# Patient Record
Sex: FEMALE | Race: WHITE | NOT HISPANIC OR LATINO | Employment: FULL TIME | URBAN - METROPOLITAN AREA
[De-identification: names, ages, dates, MRNs, and addresses within clinical notes are randomized per-mention and may not be internally consistent; named-entity substitution may affect disease eponyms.]

---

## 2018-08-29 ENCOUNTER — OFFICE VISIT (OUTPATIENT)
Dept: OBSTETRICS AND GYNECOLOGY | Facility: CLINIC | Age: 28
End: 2018-08-29
Payer: COMMERCIAL

## 2018-08-29 ENCOUNTER — LAB VISIT (OUTPATIENT)
Dept: LAB | Facility: HOSPITAL | Age: 28
End: 2018-08-29
Attending: OBSTETRICS & GYNECOLOGY
Payer: COMMERCIAL

## 2018-08-29 VITALS
DIASTOLIC BLOOD PRESSURE: 84 MMHG | HEIGHT: 61 IN | BODY MASS INDEX: 26.06 KG/M2 | WEIGHT: 138 LBS | HEART RATE: 63 BPM | SYSTOLIC BLOOD PRESSURE: 135 MMHG | RESPIRATION RATE: 15 BRPM

## 2018-08-29 DIAGNOSIS — Z11.3 SCREEN FOR STD (SEXUALLY TRANSMITTED DISEASE): ICD-10-CM

## 2018-08-29 DIAGNOSIS — Z01.419 ENCOUNTER FOR WELL WOMAN EXAM WITH ROUTINE GYNECOLOGICAL EXAM: Primary | ICD-10-CM

## 2018-08-29 DIAGNOSIS — Z30.9 ENCOUNTER FOR CONTRACEPTIVE MANAGEMENT, UNSPECIFIED TYPE: ICD-10-CM

## 2018-08-29 PROCEDURE — 99999 PR PBB SHADOW E&M-NEW PATIENT-LVL III: CPT | Mod: PBBFAC,,, | Performed by: OBSTETRICS & GYNECOLOGY

## 2018-08-29 PROCEDURE — 99385 PREV VISIT NEW AGE 18-39: CPT | Mod: S$GLB,,, | Performed by: OBSTETRICS & GYNECOLOGY

## 2018-08-29 PROCEDURE — 86703 HIV-1/HIV-2 1 RESULT ANTBDY: CPT

## 2018-08-29 PROCEDURE — 87491 CHLMYD TRACH DNA AMP PROBE: CPT

## 2018-08-29 PROCEDURE — 36415 COLL VENOUS BLD VENIPUNCTURE: CPT

## 2018-08-29 PROCEDURE — 87480 CANDIDA DNA DIR PROBE: CPT

## 2018-08-29 PROCEDURE — 86592 SYPHILIS TEST NON-TREP QUAL: CPT

## 2018-08-29 PROCEDURE — 87510 GARDNER VAG DNA DIR PROBE: CPT

## 2018-08-29 PROCEDURE — 80074 ACUTE HEPATITIS PANEL: CPT

## 2018-08-29 RX ORDER — NORETHINDRONE ACETATE AND ETHINYL ESTRADIOL 1MG-20(21)
1 KIT ORAL DAILY
Qty: 28 TABLET | Refills: 11 | Status: SHIPPED | OUTPATIENT
Start: 2018-08-29 | End: 2018-10-22

## 2018-08-29 NOTE — PROGRESS NOTES
SUBJECTIVE:   Isabelle Porter is a 28 y.o. female   for annual well woman exam. Patient's last menstrual period was 2018..  She has no unusual complaints.      Contraception: ocp, on taytulla currently. Was on loestrin 24 before doing well on it but longer cover by insurance.  taytulla makes her emotional    Thinks she had yeast infection one week ago, took diflucan with mod relief. Only mild itching today      No past medical history on file.  No past surgical history on file.  Social History     Socioeconomic History    Marital status: Single     Spouse name: Not on file    Number of children: Not on file    Years of education: Not on file    Highest education level: Not on file   Social Needs    Financial resource strain: Not on file    Food insecurity - worry: Not on file    Food insecurity - inability: Not on file    Transportation needs - medical: Not on file    Transportation needs - non-medical: Not on file   Occupational History    Not on file   Tobacco Use    Smoking status: Never Smoker   Substance and Sexual Activity    Alcohol use: No    Drug use: No    Sexual activity: Yes     Partners: Male   Other Topics Concern    Not on file   Social History Narrative    Not on file     Family History   Problem Relation Age of Onset    Diabetes Other      OB History    Para Term  AB Living   0         0   SAB TAB Ectopic Multiple Live Births           0         Obstetric Comments   Gynhx: 10/reg.   OCP -    Denies std,    Denies abnl, Pap 2016 NEG   S/p gardasil         Current Outpatient Medications   Medication Sig Dispense Refill    FLUARIX QUAD 4269-8913, PF, 60 mcg (15 mcg x 4)/0.5 mL Syrg   0    FLUVIRIN [id number] 45 mcg (15 mcg x 3)/0.5 mL Susp ADM 0.5ML IM UTD  0    norethindrone-ethinyl estradiol (LOESTRIN FE , 28-DAY,) 1 mg-20 mcg (21)/75 mg (7) per tablet Take 1 tablet by mouth once daily. 28 tablet 11     No current facility-administered medications for  "this visit.      Allergies: Codeine and Latex, natural rubber       ROS:  GENERAL: Denies weight gain or weight loss. Feeling well overall.   SKIN: Denies rash or lesions.   HEAD: Denies head injury or headache.   NODES: Denies enlarged lymph nodes.   CHEST: Denies chest pain or shortness of breath.   CARDIOVASCULAR: Denies palpitations or left sided chest pain.   ABDOMEN: No abdominal pain, constipation, diarrhea, nausea, vomiting or rectal bleeding.   URINARY: No frequency, dysuria, hematuria, or burning on urination.  REPRODUCTIVE: Denies vaginal discharge, abnormal vaginal bleeding, lesions, pelvic pain  BREASTS: The patient performs breast self-examination and denies pain, lumps, or nipple discharge.   HEMATOLOGIC: No easy bruisability or excessive bleeding.  MUSCULOSKELETAL: Denies joint pain or swelling.   NEUROLOGIC: Denies syncope or weakness.   PSYCHIATRIC: Denies depression, anxiety or mood swings.      OBJECTIVE:   /84   Pulse 63   Resp 15   Ht 5' 1" (1.549 m)   Wt 62.6 kg (138 lb 0.1 oz)   LMP 08/01/2018   BMI 26.08 kg/m²   The patient appears well, alert, oriented x 3, in no distress.  PSYCH:  Normal, full range of affect  NECK: negative, no thyromegaly, trachea midline  SKIN: normal, good color, good turgor and no acne, striae, hirsutism  BREAST EXAM: breasts appear normal, no suspicious masses, no skin or nipple changes or axillary nodes  ABDOMEN: soft, non-tender; bowel sounds normal; no masses,  no organomegaly and no hernias, masses, or hepatosplenomegaly  GENITALIA: normal external genitalia, no erythema, no discharge  BLADDER: soft  URETHRA: normal appearing urethra with no masses, tenderness or lesions and normal urethra, normal urethral meatus  VAGINA: Normal  CERVIX: no lesions or cervical motion tenderness  UTERUS: normal size, contour, position, consistency, mobility, non-tender  ADNEXA: no mass, fullness, tenderness      ASSESSMENT:   1. Health maintenance  -pap 2016 neg, next " pap 2019  -std screening, continue condoms for std prevention  -counseled on exercise and healthy diet  -contraception:  Will switch to loestrin 1/20.

## 2018-08-30 LAB
CANDIDA RRNA VAG QL PROBE: NEGATIVE
G VAGINALIS RRNA GENITAL QL PROBE: NEGATIVE
HAV IGM SERPL QL IA: NEGATIVE
HBV CORE IGM SERPL QL IA: NEGATIVE
HBV SURFACE AG SERPL QL IA: NEGATIVE
HCV AB SERPL QL IA: NEGATIVE
HIV 1+2 AB+HIV1 P24 AG SERPL QL IA: NEGATIVE
T VAGINALIS RRNA GENITAL QL PROBE: NEGATIVE

## 2018-08-31 LAB
C TRACH DNA SPEC QL NAA+PROBE: NOT DETECTED
N GONORRHOEA DNA SPEC QL NAA+PROBE: NOT DETECTED
RPR SER QL: NORMAL

## 2018-10-15 ENCOUNTER — TELEPHONE (OUTPATIENT)
Dept: OBSTETRICS AND GYNECOLOGY | Facility: CLINIC | Age: 28
End: 2018-10-15

## 2018-10-15 NOTE — TELEPHONE ENCOUNTER
----- Message from Keli Partida sent at 10/15/2018  8:10 AM CDT -----  Contact: Self  Pt is calling to speak with staff about changing the birth control she was put on. She is having a lot of side effects from the one she is on now. Please call pt at [phone].    ## Pt would need it changed before Sunday.       [id number]  [place] - [address] AT [facility] [address]  [address]  [place] [address]  Phone: [phone] Fax: [phone]    ----------------------------------------------------------------  10/15/18 @ 2401 ([facility])  SPOKE WITH MS LÓPEZ , SHE STATED SHE IS HAVING PROBLEMS WITH HER BIRTH CONTROL AND LOTS OF SIDE EFFECTS . INFORMED HER THAT SHE NEEDS TO SEE DR BARNES FACE TO FACE AND THE ISSUES , SHE WANT TO HAVE BIRTH CONTROL CHANGED , INFORMED HER THAT UNLESS DR BARNES KNOWS THE SYMPTOMS SHE IS NOT GOING TO ORDER ANOTHER BIRTH CONTROL WITH THE POSSIBILITY OF THE SAME THING HAPPENING , INFORMED HER TO COMPLETED THE ONES SHE HAS AND WHEN SHE COMES IN THE DR WILL TELL HER WHEN TO START THE NEW ONE THAT WILL BE ORDERED. OFFERED PT AN APPT TODAY AND TOMORROW , SHE STATED THAT SHE CAN NOT COME IN UNTIL NEXT WEEK DUE TO BACK TO BACK TRIALS.   APPT MADE WITH DR BARNES ON 10/22/18 @ 2:30    MESSAGE SENT TO DR BARNES FOR HER FYI

## 2018-10-22 ENCOUNTER — OFFICE VISIT (OUTPATIENT)
Dept: OBSTETRICS AND GYNECOLOGY | Facility: CLINIC | Age: 28
End: 2018-10-22
Payer: COMMERCIAL

## 2018-10-22 VITALS
RESPIRATION RATE: 15 BRPM | BODY MASS INDEX: 26.39 KG/M2 | HEART RATE: 55 BPM | HEIGHT: 61 IN | DIASTOLIC BLOOD PRESSURE: 80 MMHG | SYSTOLIC BLOOD PRESSURE: 129 MMHG | WEIGHT: 139.75 LBS

## 2018-10-22 DIAGNOSIS — Z30.41 ENCOUNTER FOR SURVEILLANCE OF CONTRACEPTIVE PILLS: Primary | ICD-10-CM

## 2018-10-22 PROCEDURE — 99999 PR PBB SHADOW E&M-EST. PATIENT-LVL III: CPT | Mod: PBBFAC,,, | Performed by: OBSTETRICS & GYNECOLOGY

## 2018-10-22 PROCEDURE — 3008F BODY MASS INDEX DOCD: CPT | Mod: CPTII,S$GLB,, | Performed by: OBSTETRICS & GYNECOLOGY

## 2018-10-22 PROCEDURE — 99213 OFFICE O/P EST LOW 20 MIN: CPT | Mod: S$GLB,,, | Performed by: OBSTETRICS & GYNECOLOGY

## 2018-10-22 RX ORDER — NORETHINDRONE ACETATE AND ETHINYL ESTRADIOL, AND FERROUS FUMARATE 1MG-20(24)
1 KIT ORAL DAILY
Qty: 28 CAPSULE | Refills: 11 | Status: SHIPPED | OUTPATIENT
Start: 2018-10-22 | End: 2018-10-22 | Stop reason: SDUPTHER

## 2018-10-22 RX ORDER — NORETHINDRONE ACETATE AND ETHINYL ESTRADIOL, AND FERROUS FUMARATE 1MG-20(24)
1 KIT ORAL DAILY
Qty: 28 CAPSULE | Refills: 11 | Status: SHIPPED | OUTPATIENT
Start: 2018-10-22 | End: 2021-05-06

## 2018-10-22 NOTE — PROGRESS NOTES
SUBJECTIVE:   28 y.o. female   for ocp side effects  Patient's last menstrual period was 10/22/2018 (exact date)..      Pt was started on  loestrin , had breast tenderness the first month but resolved. She then had BTB everyday on the .  She takes her pills daily.  She had neg UPT at home  Was on loestrin 24 before but insurance no longer covers  taytulla makes her emotional, she prefers to get restarted on this    OB History    Para Term  AB Living   0         0   SAB TAB Ectopic Multiple Live Births           0         Obstetric Comments   Gynhx: 10/reg.   OCP -    Denies std,    Denies abnl, Pap 2016 NEG   S/p gardasil     No past medical history on file.  No past surgical history on file.  Social History     Socioeconomic History    Marital status: Single     Spouse name: Not on file    Number of children: Not on file    Years of education: Not on file    Highest education level: Not on file   Social Needs    Financial resource strain: Not on file    Food insecurity - worry: Not on file    Food insecurity - inability: Not on file    Transportation needs - medical: Not on file    Transportation needs - non-medical: Not on file   Occupational History    Not on file   Tobacco Use    Smoking status: Never Smoker   Substance and Sexual Activity    Alcohol use: No    Drug use: No    Sexual activity: Yes     Partners: Male   Other Topics Concern    Not on file   Social History Narrative    Not on file     Family History   Problem Relation Age of Onset    Diabetes Other          Current Outpatient Medications   Medication Sig Dispense Refill    FLUARIX QUAD [id number], PF, 60 mcg (15 mcg x 4)/0.5 mL Syrg   0    FLUVIRIN [id number] 45 mcg (15 mcg x 3)/0.5 mL Susp ADM 0.5ML IM UTD  0    norethindrone-e.estradiol-iron (TAYTULLA) 1 mg-20 mcg (24)/75 mg (4) Cap Take 1 tablet by mouth once daily. Please give brand name 28 capsule 11     No current facility-administered  "medications for this visit.      Allergies: Codeine and Latex, natural rubber     ROS:  GENERAL: Denies weight gain or weight loss. Feeling well overall.   SKIN: Denies rash or lesions.   HEAD: Denies head injury or headache.   NODES: Denies enlarged lymph nodes.   CHEST: Denies chest pain or shortness of breath.   CARDIOVASCULAR: Denies palpitations or left sided chest pain.   ABDOMEN: No abdominal pain, constipation, diarrhea, nausea, vomiting or rectal bleeding.   URINARY: No frequency, dysuria, hematuria, or burning on urination.  REPRODUCTIVE: see HPI  BREASTS:see HPI  HEMATOLOGIC: No easy bruisability or excessive bleeding.  MUSCULOSKELETAL: Denies joint pain or swelling.   NEUROLOGIC: Denies syncope or weakness.   PSYCHIATRIC: Denies depression, anxiety or mood swings.      OBJECTIVE:   /80   Pulse (!) 55   Resp 15   Ht 5' 1" (1.549 m)   Wt 63.4 kg (139 lb 12.4 oz)   LMP 10/22/2018 (Exact Date) Comment: pt states she have been bleeding for over an month   BMI 26.41 kg/m²   The patient appears well, alert, oriented x 3, in no distress.    Counseling appt only      ASSESSMENT:   1. Discussed ocp side effects.  Pt elected to restart on taytulla. rx for taytulla sent  "

## 2018-12-17 ENCOUNTER — TELEPHONE (OUTPATIENT)
Dept: OBSTETRICS AND GYNECOLOGY | Facility: CLINIC | Age: 28
End: 2018-12-17

## 2018-12-17 RX ORDER — NORGESTIMATE AND ETHINYL ESTRADIOL 0.25-0.035
1 KIT ORAL DAILY
Qty: 28 TABLET | Refills: 11 | Status: SHIPPED | OUTPATIENT
Start: 2018-12-17 | End: 2019-11-20 | Stop reason: SDUPTHER

## 2018-12-17 NOTE — TELEPHONE ENCOUNTER
12/17/18 @ 1758 ([facility])   SPOKE WITH MS LÓPEZ, INFORMED THE THAT DR BARNES SENT SPRINTEC TO HER PHARMACY AND IT  SHOULD BE ABOUT $9         NicholeBalbir Cárdenas Mai, MD   to Me           12/17/18 5:42 PM    rx for sprintec sent   If insurance does not cover, [facility] should have it for $9.   If not she can also call insurance to see which OCP is preferred

## 2018-12-17 NOTE — TELEPHONE ENCOUNTER
12/17/18 @ 8614 ([facility])  SPOKE WITH MS LÓPEZ, SHE STATED THAT HER INSURANCE CO. WILL NO LONGER COVER HER BIRTH CONTROL AND SHE WOULD LIKE THE DR TO ORDER ONE THAT HER INSURANCE WILL COVER, INFORMED PT THAT I WILL SEND DR ABRNES A MESSAGE TO ORDER A NEW BIRTH CONTROL FOR HER    ----- Message from Jenny Shirley sent at 12/17/2018  8:44 AM CST -----  Contact: self  Pt calling to discuss birth control. Please call [phone].

## 2019-11-19 RX ORDER — NORGESTIMATE AND ETHINYL ESTRADIOL 0.25-0.035
1 KIT ORAL DAILY
Qty: 28 TABLET | Refills: 11 | Status: CANCELLED | OUTPATIENT
Start: 2019-11-19 | End: 2020-11-18

## 2019-11-19 NOTE — TELEPHONE ENCOUNTER
Pharmacy called with refill request. Called pt to verify, pt states that she is now seeing a different OB due to insurance purposes and has already received the medication.

## 2019-11-20 RX ORDER — NORGESTIMATE AND ETHINYL ESTRADIOL 0.25-0.035
KIT ORAL
Qty: 28 TABLET | Refills: 0 | Status: SHIPPED | OUTPATIENT
Start: 2019-11-20 | End: 2021-05-06

## 2021-02-26 ENCOUNTER — OFFICE VISIT (OUTPATIENT)
Dept: OBSTETRICS AND GYNECOLOGY | Facility: CLINIC | Age: 31
End: 2021-02-26
Payer: COMMERCIAL

## 2021-02-26 ENCOUNTER — LAB VISIT (OUTPATIENT)
Dept: LAB | Facility: OTHER | Age: 31
End: 2021-02-26
Attending: OBSTETRICS & GYNECOLOGY
Payer: OTHER GOVERNMENT

## 2021-02-26 VITALS
WEIGHT: 175.94 LBS | SYSTOLIC BLOOD PRESSURE: 114 MMHG | BODY MASS INDEX: 33.24 KG/M2 | DIASTOLIC BLOOD PRESSURE: 68 MMHG

## 2021-02-26 DIAGNOSIS — Z31.41 FERTILITY TESTING: Primary | ICD-10-CM

## 2021-02-26 DIAGNOSIS — Z01.419 ENCOUNTER FOR ANNUAL ROUTINE GYNECOLOGICAL EXAMINATION: ICD-10-CM

## 2021-02-26 DIAGNOSIS — Z31.41 FERTILITY TESTING: ICD-10-CM

## 2021-02-26 LAB
ESTRADIOL SERPL-MCNC: 92 PG/ML
FSH SERPL-ACNC: 3.6 MIU/ML
LH SERPL-ACNC: 8.5 MIU/ML
PROGEST SERPL-MCNC: 3.9 NG/ML
PROLACTIN SERPL IA-MCNC: 9.2 NG/ML (ref 5.2–26.5)
TSH SERPL DL<=0.005 MIU/L-ACNC: 0.99 UIU/ML (ref 0.4–4)

## 2021-02-26 PROCEDURE — 88175 CYTOPATH C/V AUTO FLUID REDO: CPT | Performed by: PATHOLOGY

## 2021-02-26 PROCEDURE — 82670 ASSAY OF TOTAL ESTRADIOL: CPT

## 2021-02-26 PROCEDURE — 36415 COLL VENOUS BLD VENIPUNCTURE: CPT

## 2021-02-26 PROCEDURE — 3008F PR BODY MASS INDEX (BMI) DOCUMENTED: ICD-10-PCS | Mod: CPTII,S$GLB,, | Performed by: OBSTETRICS & GYNECOLOGY

## 2021-02-26 PROCEDURE — 1126F PR PAIN SEVERITY QUANTIFIED, NO PAIN PRESENT: ICD-10-PCS | Mod: S$GLB,,, | Performed by: OBSTETRICS & GYNECOLOGY

## 2021-02-26 PROCEDURE — 83002 ASSAY OF GONADOTROPIN (LH): CPT

## 2021-02-26 PROCEDURE — 83001 ASSAY OF GONADOTROPIN (FSH): CPT

## 2021-02-26 PROCEDURE — 99395 PREV VISIT EST AGE 18-39: CPT | Mod: 25,S$GLB,, | Performed by: OBSTETRICS & GYNECOLOGY

## 2021-02-26 PROCEDURE — 99395 PR PREVENTIVE VISIT,EST,18-39: ICD-10-PCS | Mod: 25,S$GLB,, | Performed by: OBSTETRICS & GYNECOLOGY

## 2021-02-26 PROCEDURE — 84443 ASSAY THYROID STIM HORMONE: CPT

## 2021-02-26 PROCEDURE — 84146 ASSAY OF PROLACTIN: CPT

## 2021-02-26 PROCEDURE — 3008F BODY MASS INDEX DOCD: CPT | Mod: CPTII,S$GLB,, | Performed by: OBSTETRICS & GYNECOLOGY

## 2021-02-26 PROCEDURE — 1126F AMNT PAIN NOTED NONE PRSNT: CPT | Mod: S$GLB,,, | Performed by: OBSTETRICS & GYNECOLOGY

## 2021-02-26 PROCEDURE — 99999 PR PBB SHADOW E&M-EST. PATIENT-LVL III: ICD-10-PCS | Mod: PBBFAC,,, | Performed by: OBSTETRICS & GYNECOLOGY

## 2021-02-26 PROCEDURE — 99999 PR PBB SHADOW E&M-EST. PATIENT-LVL III: CPT | Mod: PBBFAC,,, | Performed by: OBSTETRICS & GYNECOLOGY

## 2021-02-26 PROCEDURE — 84144 ASSAY OF PROGESTERONE: CPT

## 2021-02-26 RX ORDER — CETIRIZINE HYDROCHLORIDE 10 MG/1
10 TABLET ORAL
COMMUNITY
End: 2021-05-06

## 2021-03-01 ENCOUNTER — HOSPITAL ENCOUNTER (OUTPATIENT)
Dept: RADIOLOGY | Facility: OTHER | Age: 31
Discharge: HOME | End: 2021-03-01
Attending: OBSTETRICS & GYNECOLOGY
Payer: COMMERCIAL

## 2021-03-01 DIAGNOSIS — Z31.41 FERTILITY TESTING: ICD-10-CM

## 2021-03-01 PROCEDURE — 76856 US EXAM PELVIC COMPLETE: CPT | Mod: TC

## 2021-03-01 PROCEDURE — 76856 US PELVIS COMP WITH TRANSVAG NON-OB (XPD): ICD-10-PCS | Mod: 26,,, | Performed by: RADIOLOGY

## 2021-03-01 PROCEDURE — 76830 TRANSVAGINAL US NON-OB: CPT | Mod: 26,,, | Performed by: RADIOLOGY

## 2021-03-01 PROCEDURE — 76830 US PELVIS COMP WITH TRANSVAG NON-OB (XPD): ICD-10-PCS | Mod: 26,,, | Performed by: RADIOLOGY

## 2021-03-01 PROCEDURE — 76856 US EXAM PELVIC COMPLETE: CPT | Mod: 26,,, | Performed by: RADIOLOGY

## 2021-03-05 ENCOUNTER — PATIENT MESSAGE (OUTPATIENT)
Dept: OBSTETRICS AND GYNECOLOGY | Facility: CLINIC | Age: 31
End: 2021-03-05

## 2021-03-09 ENCOUNTER — PATIENT MESSAGE (OUTPATIENT)
Dept: OBSTETRICS AND GYNECOLOGY | Facility: CLINIC | Age: 31
End: 2021-03-09

## 2021-03-19 ENCOUNTER — PATIENT MESSAGE (OUTPATIENT)
Dept: OBSTETRICS AND GYNECOLOGY | Facility: CLINIC | Age: 31
End: 2021-03-19

## 2021-03-19 LAB
FINAL PATHOLOGIC DIAGNOSIS: ABNORMAL
Lab: ABNORMAL

## 2021-04-15 ENCOUNTER — PATIENT MESSAGE (OUTPATIENT)
Dept: OBSTETRICS AND GYNECOLOGY | Facility: CLINIC | Age: 31
End: 2021-04-15

## 2021-04-16 ENCOUNTER — HOSPITAL ENCOUNTER (EMERGENCY)
Facility: OTHER | Age: 31
Discharge: HOME | End: 2021-04-16
Attending: EMERGENCY MEDICINE
Payer: OTHER GOVERNMENT

## 2021-04-16 ENCOUNTER — TELEPHONE (OUTPATIENT)
Dept: OBSTETRICS AND GYNECOLOGY | Facility: CLINIC | Age: 31
End: 2021-04-16

## 2021-04-16 ENCOUNTER — NURSE TRIAGE (OUTPATIENT)
Dept: ADMINISTRATIVE | Facility: CLINIC | Age: 31
End: 2021-04-16

## 2021-04-16 VITALS
SYSTOLIC BLOOD PRESSURE: 117 MMHG | HEART RATE: 99 BPM | BODY MASS INDEX: 33.38 KG/M2 | RESPIRATION RATE: 19 BRPM | WEIGHT: 170 LBS | TEMPERATURE: 99 F | DIASTOLIC BLOOD PRESSURE: 65 MMHG | OXYGEN SATURATION: 100 % | HEIGHT: 60 IN

## 2021-04-16 DIAGNOSIS — Z34.90 PREGNANCY: Primary | ICD-10-CM

## 2021-04-16 DIAGNOSIS — O20.9 VAGINAL BLEEDING IN PREGNANCY, FIRST TRIMESTER: ICD-10-CM

## 2021-04-16 DIAGNOSIS — O20.0 THREATENED MISCARRIAGE: ICD-10-CM

## 2021-04-16 DIAGNOSIS — N93.9 VAGINAL BLEEDING: Primary | ICD-10-CM

## 2021-04-16 LAB
ABO + RH BLD: NORMAL
ALBUMIN SERPL BCP-MCNC: 4.1 G/DL (ref 3.5–5.2)
ALP SERPL-CCNC: 56 U/L (ref 55–135)
ALT SERPL W/O P-5'-P-CCNC: 36 U/L (ref 10–44)
ANION GAP SERPL CALC-SCNC: 8 MMOL/L (ref 8–16)
AST SERPL-CCNC: 26 U/L (ref 10–40)
B-HCG UR QL: POSITIVE
BACTERIA GENITAL QL WET PREP: ABNORMAL
BASOPHILS # BLD AUTO: 0.04 K/UL (ref 0–0.2)
BASOPHILS NFR BLD: 0.5 % (ref 0–1.9)
BILIRUB SERPL-MCNC: 0.4 MG/DL (ref 0.1–1)
BUN SERPL-MCNC: 10 MG/DL (ref 6–20)
CALCIUM SERPL-MCNC: 9.2 MG/DL (ref 8.7–10.5)
CHLORIDE SERPL-SCNC: 108 MMOL/L (ref 95–110)
CLUE CELLS VAG QL WET PREP: ABNORMAL
CO2 SERPL-SCNC: 22 MMOL/L (ref 23–29)
CREAT SERPL-MCNC: 0.8 MG/DL (ref 0.5–1.4)
CTP QC/QA: YES
DIFFERENTIAL METHOD: NORMAL
EOSINOPHIL # BLD AUTO: 0.1 K/UL (ref 0–0.5)
EOSINOPHIL NFR BLD: 1.5 % (ref 0–8)
ERYTHROCYTE [DISTWIDTH] IN BLOOD BY AUTOMATED COUNT: 12.9 % (ref 11.5–14.5)
EST. GFR  (AFRICAN AMERICAN): >60 ML/MIN/1.73 M^2
EST. GFR  (NON AFRICAN AMERICAN): >60 ML/MIN/1.73 M^2
FILAMENT FUNGI VAG WET PREP-#/AREA: ABNORMAL
GLUCOSE SERPL-MCNC: 111 MG/DL (ref 70–110)
HCG INTACT+B SERPL-ACNC: 2637 MIU/ML
HCT VFR BLD AUTO: 39.3 % (ref 37–48.5)
HGB BLD-MCNC: 13.2 G/DL (ref 12–16)
IMM GRANULOCYTES # BLD AUTO: 0.03 K/UL (ref 0–0.04)
IMM GRANULOCYTES NFR BLD AUTO: 0.4 % (ref 0–0.5)
LYMPHOCYTES # BLD AUTO: 2.2 K/UL (ref 1–4.8)
LYMPHOCYTES NFR BLD: 28.8 % (ref 18–48)
MCH RBC QN AUTO: 28.6 PG (ref 27–31)
MCHC RBC AUTO-ENTMCNC: 33.6 G/DL (ref 32–36)
MCV RBC AUTO: 85 FL (ref 82–98)
MONOCYTES # BLD AUTO: 0.5 K/UL (ref 0.3–1)
MONOCYTES NFR BLD: 6.2 % (ref 4–15)
NEUTROPHILS # BLD AUTO: 4.9 K/UL (ref 1.8–7.7)
NEUTROPHILS NFR BLD: 62.6 % (ref 38–73)
NRBC BLD-RTO: 0 /100 WBC
PLATELET # BLD AUTO: 323 K/UL (ref 150–450)
PMV BLD AUTO: 9.4 FL (ref 9.2–12.9)
POTASSIUM SERPL-SCNC: 3.6 MMOL/L (ref 3.5–5.1)
PROT SERPL-MCNC: 7 G/DL (ref 6–8.4)
RBC # BLD AUTO: 4.62 M/UL (ref 4–5.4)
SODIUM SERPL-SCNC: 138 MMOL/L (ref 136–145)
SPECIMEN SOURCE: ABNORMAL
T VAGINALIS GENITAL QL WET PREP: ABNORMAL
WBC # BLD AUTO: 7.78 K/UL (ref 3.9–12.7)
WBC #/AREA VAG WET PREP: ABNORMAL
YEAST GENITAL QL WET PREP: ABNORMAL

## 2021-04-16 PROCEDURE — 25000003 PHARM REV CODE 250: Performed by: PHYSICIAN ASSISTANT

## 2021-04-16 PROCEDURE — 96361 HYDRATE IV INFUSION ADD-ON: CPT

## 2021-04-16 PROCEDURE — 87491 CHLMYD TRACH DNA AMP PROBE: CPT | Performed by: PHYSICIAN ASSISTANT

## 2021-04-16 PROCEDURE — 86900 BLOOD TYPING SEROLOGIC ABO: CPT | Performed by: PHYSICIAN ASSISTANT

## 2021-04-16 PROCEDURE — 81025 URINE PREGNANCY TEST: CPT | Performed by: PHYSICIAN ASSISTANT

## 2021-04-16 PROCEDURE — 85025 COMPLETE CBC W/AUTO DIFF WBC: CPT | Performed by: PHYSICIAN ASSISTANT

## 2021-04-16 PROCEDURE — 84702 CHORIONIC GONADOTROPIN TEST: CPT | Performed by: PHYSICIAN ASSISTANT

## 2021-04-16 PROCEDURE — 87591 N.GONORRHOEAE DNA AMP PROB: CPT | Performed by: PHYSICIAN ASSISTANT

## 2021-04-16 PROCEDURE — 99284 EMERGENCY DEPT VISIT MOD MDM: CPT | Mod: 25

## 2021-04-16 PROCEDURE — 87210 SMEAR WET MOUNT SALINE/INK: CPT | Performed by: PHYSICIAN ASSISTANT

## 2021-04-16 PROCEDURE — 96360 HYDRATION IV INFUSION INIT: CPT

## 2021-04-16 PROCEDURE — 80053 COMPREHEN METABOLIC PANEL: CPT | Performed by: PHYSICIAN ASSISTANT

## 2021-04-16 RX ADMIN — SODIUM CHLORIDE 1000 ML: 0.9 INJECTION, SOLUTION INTRAVENOUS at 01:04

## 2021-04-19 ENCOUNTER — LAB VISIT (OUTPATIENT)
Dept: LAB | Facility: OTHER | Age: 31
End: 2021-04-19
Payer: COMMERCIAL

## 2021-04-19 ENCOUNTER — PATIENT MESSAGE (OUTPATIENT)
Dept: OBSTETRICS AND GYNECOLOGY | Facility: CLINIC | Age: 31
End: 2021-04-19

## 2021-04-19 DIAGNOSIS — N93.9 VAGINAL BLEEDING: ICD-10-CM

## 2021-04-19 LAB
C TRACH DNA SPEC QL NAA+PROBE: NOT DETECTED
HCG INTACT+B SERPL-ACNC: 6357 MIU/ML
N GONORRHOEA DNA SPEC QL NAA+PROBE: NOT DETECTED

## 2021-04-19 PROCEDURE — 36415 COLL VENOUS BLD VENIPUNCTURE: CPT | Performed by: STUDENT IN AN ORGANIZED HEALTH CARE EDUCATION/TRAINING PROGRAM

## 2021-04-19 PROCEDURE — 84702 CHORIONIC GONADOTROPIN TEST: CPT | Performed by: STUDENT IN AN ORGANIZED HEALTH CARE EDUCATION/TRAINING PROGRAM

## 2021-04-23 ENCOUNTER — TELEPHONE (OUTPATIENT)
Dept: OBSTETRICS AND GYNECOLOGY | Facility: CLINIC | Age: 31
End: 2021-04-23

## 2021-04-23 DIAGNOSIS — O20.9 BLEEDING IN EARLY PREGNANCY: Primary | ICD-10-CM

## 2021-04-26 ENCOUNTER — PATIENT MESSAGE (OUTPATIENT)
Dept: RESEARCH | Facility: HOSPITAL | Age: 31
End: 2021-04-26

## 2021-04-28 ENCOUNTER — PROCEDURE VISIT (OUTPATIENT)
Dept: MATERNAL FETAL MEDICINE | Facility: CLINIC | Age: 31
End: 2021-04-28
Payer: COMMERCIAL

## 2021-04-28 DIAGNOSIS — O20.9 BLEEDING IN EARLY PREGNANCY: Primary | ICD-10-CM

## 2021-04-28 DIAGNOSIS — Z34.90 PREGNANCY, UNSPECIFIED GESTATIONAL AGE: Primary | ICD-10-CM

## 2021-04-28 PROCEDURE — 76801 OB US < 14 WKS SINGLE FETUS: CPT | Mod: S$GLB,,, | Performed by: OBSTETRICS & GYNECOLOGY

## 2021-04-28 PROCEDURE — 76801 PR US, OB <14WKS, TRANSABD, SINGLE GESTATION: ICD-10-PCS | Mod: S$GLB,,, | Performed by: OBSTETRICS & GYNECOLOGY

## 2021-04-29 ENCOUNTER — TELEPHONE (OUTPATIENT)
Dept: OBSTETRICS AND GYNECOLOGY | Facility: CLINIC | Age: 31
End: 2021-04-29

## 2021-04-30 ENCOUNTER — LAB VISIT (OUTPATIENT)
Dept: LAB | Facility: OTHER | Age: 31
End: 2021-04-30
Attending: OBSTETRICS & GYNECOLOGY
Payer: COMMERCIAL

## 2021-04-30 DIAGNOSIS — Z34.90 PREGNANCY, UNSPECIFIED GESTATIONAL AGE: ICD-10-CM

## 2021-04-30 LAB — HCG INTACT+B SERPL-ACNC: NORMAL MIU/ML

## 2021-04-30 PROCEDURE — 84702 CHORIONIC GONADOTROPIN TEST: CPT | Performed by: OBSTETRICS & GYNECOLOGY

## 2021-04-30 PROCEDURE — 36415 COLL VENOUS BLD VENIPUNCTURE: CPT | Performed by: OBSTETRICS & GYNECOLOGY

## 2021-05-03 ENCOUNTER — LAB VISIT (OUTPATIENT)
Dept: LAB | Facility: OTHER | Age: 31
End: 2021-05-03
Attending: OBSTETRICS & GYNECOLOGY
Payer: OTHER GOVERNMENT

## 2021-05-03 DIAGNOSIS — Z34.90 PREGNANCY, UNSPECIFIED GESTATIONAL AGE: ICD-10-CM

## 2021-05-03 LAB — HCG INTACT+B SERPL-ACNC: NORMAL MIU/ML

## 2021-05-03 PROCEDURE — 36415 COLL VENOUS BLD VENIPUNCTURE: CPT | Performed by: OBSTETRICS & GYNECOLOGY

## 2021-05-03 PROCEDURE — 84702 CHORIONIC GONADOTROPIN TEST: CPT | Performed by: OBSTETRICS & GYNECOLOGY

## 2021-05-06 ENCOUNTER — TELEPHONE (OUTPATIENT)
Dept: OBSTETRICS AND GYNECOLOGY | Facility: CLINIC | Age: 31
End: 2021-05-06

## 2021-05-06 ENCOUNTER — PATIENT MESSAGE (OUTPATIENT)
Dept: OBSTETRICS AND GYNECOLOGY | Facility: CLINIC | Age: 31
End: 2021-05-06

## 2021-05-06 ENCOUNTER — OFFICE VISIT (OUTPATIENT)
Dept: OBSTETRICS AND GYNECOLOGY | Facility: CLINIC | Age: 31
End: 2021-05-06
Payer: OTHER GOVERNMENT

## 2021-05-06 ENCOUNTER — PROCEDURE VISIT (OUTPATIENT)
Dept: MATERNAL FETAL MEDICINE | Facility: CLINIC | Age: 31
End: 2021-05-06
Payer: OTHER GOVERNMENT

## 2021-05-06 VITALS
BODY MASS INDEX: 34.37 KG/M2 | HEIGHT: 60 IN | SYSTOLIC BLOOD PRESSURE: 128 MMHG | WEIGHT: 175.06 LBS | DIASTOLIC BLOOD PRESSURE: 90 MMHG

## 2021-05-06 DIAGNOSIS — Z34.90 PREGNANCY: ICD-10-CM

## 2021-05-06 DIAGNOSIS — N91.4 SECONDARY OLIGOMENORRHEA: Primary | ICD-10-CM

## 2021-05-06 DIAGNOSIS — Z32.01 POSITIVE PREGNANCY TEST: ICD-10-CM

## 2021-05-06 DIAGNOSIS — O36.80X0 PREGNANCY WITH UNCERTAIN FETAL VIABILITY, SINGLE OR UNSPECIFIED FETUS: ICD-10-CM

## 2021-05-06 LAB
B-HCG UR QL: POSITIVE
CTP QC/QA: YES

## 2021-05-06 PROCEDURE — 76817 TRANSVAGINAL US OBSTETRIC: CPT | Mod: 26,S$PBB,, | Performed by: OBSTETRICS & GYNECOLOGY

## 2021-05-06 PROCEDURE — 99213 PR OFFICE/OUTPT VISIT, EST, LEVL III, 20-29 MIN: ICD-10-PCS | Mod: S$PBB,,, | Performed by: PHYSICIAN ASSISTANT

## 2021-05-06 PROCEDURE — 99999 PR PBB SHADOW E&M-EST. PATIENT-LVL III: CPT | Mod: PBBFAC,,, | Performed by: PHYSICIAN ASSISTANT

## 2021-05-06 PROCEDURE — 99213 OFFICE O/P EST LOW 20 MIN: CPT | Mod: PBBFAC | Performed by: PHYSICIAN ASSISTANT

## 2021-05-06 PROCEDURE — 99999 PR PBB SHADOW E&M-EST. PATIENT-LVL III: ICD-10-PCS | Mod: PBBFAC,,, | Performed by: PHYSICIAN ASSISTANT

## 2021-05-06 PROCEDURE — 76817 PR US, OB, TRANSVAG APPROACH: ICD-10-PCS | Mod: 26,S$PBB,, | Performed by: OBSTETRICS & GYNECOLOGY

## 2021-05-06 PROCEDURE — 99213 OFFICE O/P EST LOW 20 MIN: CPT | Mod: S$PBB,,, | Performed by: PHYSICIAN ASSISTANT

## 2021-05-06 PROCEDURE — 87086 URINE CULTURE/COLONY COUNT: CPT | Performed by: PHYSICIAN ASSISTANT

## 2021-05-06 PROCEDURE — 76817 TRANSVAGINAL US OBSTETRIC: CPT | Mod: PBBFAC | Performed by: OBSTETRICS & GYNECOLOGY

## 2021-05-06 PROCEDURE — 81025 URINE PREGNANCY TEST: CPT | Mod: PBBFAC | Performed by: PHYSICIAN ASSISTANT

## 2021-05-07 ENCOUNTER — TELEPHONE (OUTPATIENT)
Dept: OBSTETRICS AND GYNECOLOGY | Facility: CLINIC | Age: 31
End: 2021-05-07

## 2021-05-07 ENCOUNTER — OFFICE VISIT (OUTPATIENT)
Dept: OBSTETRICS AND GYNECOLOGY | Facility: CLINIC | Age: 31
End: 2021-05-07
Payer: OTHER GOVERNMENT

## 2021-05-07 VITALS
SYSTOLIC BLOOD PRESSURE: 130 MMHG | HEIGHT: 60 IN | DIASTOLIC BLOOD PRESSURE: 90 MMHG | WEIGHT: 173.94 LBS | BODY MASS INDEX: 34.15 KG/M2

## 2021-05-07 DIAGNOSIS — Z01.818 PREOP EXAMINATION: ICD-10-CM

## 2021-05-07 DIAGNOSIS — O02.1 MISSED ABORTION: Primary | ICD-10-CM

## 2021-05-07 LAB
BACTERIA UR CULT: NORMAL
BACTERIA UR CULT: NORMAL

## 2021-05-07 PROCEDURE — 99999 PR PBB SHADOW E&M-EST. PATIENT-LVL III: ICD-10-PCS | Mod: PBBFAC,,, | Performed by: OBSTETRICS & GYNECOLOGY

## 2021-05-07 PROCEDURE — 99999 PR PBB SHADOW E&M-EST. PATIENT-LVL III: CPT | Mod: PBBFAC,,, | Performed by: OBSTETRICS & GYNECOLOGY

## 2021-05-07 PROCEDURE — 99213 OFFICE O/P EST LOW 20 MIN: CPT | Mod: PBBFAC | Performed by: OBSTETRICS & GYNECOLOGY

## 2021-05-07 PROCEDURE — 99213 PR OFFICE/OUTPT VISIT, EST, LEVL III, 20-29 MIN: ICD-10-PCS | Mod: S$PBB,,, | Performed by: OBSTETRICS & GYNECOLOGY

## 2021-05-07 PROCEDURE — 99213 OFFICE O/P EST LOW 20 MIN: CPT | Mod: S$PBB,,, | Performed by: OBSTETRICS & GYNECOLOGY

## 2021-05-07 RX ORDER — SODIUM CHLORIDE 9 MG/ML
INJECTION, SOLUTION INTRAVENOUS CONTINUOUS
Status: CANCELLED | OUTPATIENT
Start: 2021-05-07

## 2021-05-07 RX ORDER — MUPIROCIN 20 MG/G
OINTMENT TOPICAL
Status: CANCELLED | OUTPATIENT
Start: 2021-05-07

## 2021-05-10 ENCOUNTER — ANESTHESIA EVENT (OUTPATIENT)
Dept: SURGERY | Facility: OTHER | Age: 31
End: 2021-05-10
Payer: OTHER GOVERNMENT

## 2021-05-10 ENCOUNTER — HOSPITAL ENCOUNTER (OUTPATIENT)
Facility: OTHER | Age: 31
Discharge: HOME | End: 2021-05-10
Attending: OBSTETRICS & GYNECOLOGY | Admitting: OBSTETRICS & GYNECOLOGY
Payer: OTHER GOVERNMENT

## 2021-05-10 ENCOUNTER — ANESTHESIA (OUTPATIENT)
Dept: SURGERY | Facility: OTHER | Age: 31
End: 2021-05-10
Payer: OTHER GOVERNMENT

## 2021-05-10 VITALS
OXYGEN SATURATION: 99 % | WEIGHT: 173 LBS | SYSTOLIC BLOOD PRESSURE: 125 MMHG | HEIGHT: 60 IN | HEART RATE: 60 BPM | RESPIRATION RATE: 18 BRPM | DIASTOLIC BLOOD PRESSURE: 80 MMHG | BODY MASS INDEX: 33.96 KG/M2 | TEMPERATURE: 98 F

## 2021-05-10 DIAGNOSIS — N91.4 SECONDARY OLIGOMENORRHEA: ICD-10-CM

## 2021-05-10 DIAGNOSIS — Z98.890 S/P D&C (STATUS POST DILATION AND CURETTAGE): Primary | ICD-10-CM

## 2021-05-10 DIAGNOSIS — O02.1 MISSED ABORTION: ICD-10-CM

## 2021-05-10 DIAGNOSIS — Z32.01 POSITIVE PREGNANCY TEST: ICD-10-CM

## 2021-05-10 LAB
ABO + RH BLD: NORMAL
BASOPHILS # BLD AUTO: 0.04 K/UL (ref 0–0.2)
BASOPHILS NFR BLD: 0.5 % (ref 0–1.9)
BLD GP AB SCN CELLS X3 SERPL QL: NORMAL
DIFFERENTIAL METHOD: NORMAL
EOSINOPHIL # BLD AUTO: 0.1 K/UL (ref 0–0.5)
EOSINOPHIL NFR BLD: 1.7 % (ref 0–8)
ERYTHROCYTE [DISTWIDTH] IN BLOOD BY AUTOMATED COUNT: 12.2 % (ref 11.5–14.5)
HCT VFR BLD AUTO: 42.8 % (ref 37–48.5)
HGB BLD-MCNC: 14.2 G/DL (ref 12–16)
IMM GRANULOCYTES # BLD AUTO: 0.02 K/UL (ref 0–0.04)
IMM GRANULOCYTES NFR BLD AUTO: 0.3 % (ref 0–0.5)
LYMPHOCYTES # BLD AUTO: 2.4 K/UL (ref 1–4.8)
LYMPHOCYTES NFR BLD: 31.8 % (ref 18–48)
MCH RBC QN AUTO: 29.2 PG (ref 27–31)
MCHC RBC AUTO-ENTMCNC: 33.2 G/DL (ref 32–36)
MCV RBC AUTO: 88 FL (ref 82–98)
MONOCYTES # BLD AUTO: 0.5 K/UL (ref 0.3–1)
MONOCYTES NFR BLD: 6.1 % (ref 4–15)
NEUTROPHILS # BLD AUTO: 4.5 K/UL (ref 1.8–7.7)
NEUTROPHILS NFR BLD: 59.6 % (ref 38–73)
NRBC BLD-RTO: 0 /100 WBC
PLATELET # BLD AUTO: 305 K/UL (ref 150–450)
PMV BLD AUTO: 9.7 FL (ref 9.2–12.9)
RBC # BLD AUTO: 4.87 M/UL (ref 4–5.4)
SARS-COV-2 RDRP RESP QL NAA+PROBE: NEGATIVE
WBC # BLD AUTO: 7.48 K/UL (ref 3.9–12.7)

## 2021-05-10 PROCEDURE — 88305 TISSUE EXAM BY PATHOLOGIST: CPT | Performed by: PATHOLOGY

## 2021-05-10 PROCEDURE — 85025 COMPLETE CBC W/AUTO DIFF WBC: CPT | Performed by: OBSTETRICS & GYNECOLOGY

## 2021-05-10 PROCEDURE — 37000008 HC ANESTHESIA 1ST 15 MINUTES: Performed by: OBSTETRICS & GYNECOLOGY

## 2021-05-10 PROCEDURE — 36415 COLL VENOUS BLD VENIPUNCTURE: CPT | Performed by: OBSTETRICS & GYNECOLOGY

## 2021-05-10 PROCEDURE — 36000704 HC OR TIME LEV I 1ST 15 MIN: Performed by: OBSTETRICS & GYNECOLOGY

## 2021-05-10 PROCEDURE — 25000003 PHARM REV CODE 250

## 2021-05-10 PROCEDURE — U0002 COVID-19 LAB TEST NON-CDC: HCPCS | Performed by: OBSTETRICS & GYNECOLOGY

## 2021-05-10 PROCEDURE — 59820 CARE OF MISCARRIAGE: CPT | Mod: ,,, | Performed by: OBSTETRICS & GYNECOLOGY

## 2021-05-10 PROCEDURE — 25000003 PHARM REV CODE 250: Performed by: OBSTETRICS & GYNECOLOGY

## 2021-05-10 PROCEDURE — 63600175 PHARM REV CODE 636 W HCPCS: Performed by: NURSE ANESTHETIST, CERTIFIED REGISTERED

## 2021-05-10 PROCEDURE — 01965 ANES INCOMPL/MISSED AB PX: CPT | Performed by: OBSTETRICS & GYNECOLOGY

## 2021-05-10 PROCEDURE — 88305 TISSUE EXAM BY PATHOLOGIST: CPT | Mod: 26,,, | Performed by: PATHOLOGY

## 2021-05-10 PROCEDURE — 71000033 HC RECOVERY, INTIAL HOUR: Performed by: OBSTETRICS & GYNECOLOGY

## 2021-05-10 PROCEDURE — 25000003 PHARM REV CODE 250: Performed by: NURSE ANESTHETIST, CERTIFIED REGISTERED

## 2021-05-10 PROCEDURE — 71000016 HC POSTOP RECOV ADDL HR: Performed by: OBSTETRICS & GYNECOLOGY

## 2021-05-10 PROCEDURE — 36000705 HC OR TIME LEV I EA ADD 15 MIN: Performed by: OBSTETRICS & GYNECOLOGY

## 2021-05-10 PROCEDURE — 59820 PR SURG RX MISSED ABORTN,1ST TRI: ICD-10-PCS | Mod: ,,, | Performed by: OBSTETRICS & GYNECOLOGY

## 2021-05-10 PROCEDURE — 37000009 HC ANESTHESIA EA ADD 15 MINS: Performed by: OBSTETRICS & GYNECOLOGY

## 2021-05-10 PROCEDURE — 86900 BLOOD TYPING SEROLOGIC ABO: CPT | Performed by: OBSTETRICS & GYNECOLOGY

## 2021-05-10 PROCEDURE — 88305 TISSUE EXAM BY PATHOLOGIST: ICD-10-PCS | Mod: 26,,, | Performed by: PATHOLOGY

## 2021-05-10 PROCEDURE — 25000003 PHARM REV CODE 250: Performed by: ANESTHESIOLOGY

## 2021-05-10 PROCEDURE — 71000015 HC POSTOP RECOV 1ST HR: Performed by: OBSTETRICS & GYNECOLOGY

## 2021-05-10 RX ORDER — SCOLOPAMINE TRANSDERMAL SYSTEM 1 MG/1
PATCH, EXTENDED RELEASE TRANSDERMAL
Status: DISCONTINUED | OUTPATIENT
Start: 2021-05-10 | End: 2021-05-10

## 2021-05-10 RX ORDER — DEXAMETHASONE SODIUM PHOSPHATE 4 MG/ML
INJECTION, SOLUTION INTRA-ARTICULAR; INTRALESIONAL; INTRAMUSCULAR; INTRAVENOUS; SOFT TISSUE
Status: DISCONTINUED | OUTPATIENT
Start: 2021-05-10 | End: 2021-05-10

## 2021-05-10 RX ORDER — PROPOFOL 10 MG/ML
VIAL (ML) INTRAVENOUS
Status: DISCONTINUED | OUTPATIENT
Start: 2021-05-10 | End: 2021-05-10

## 2021-05-10 RX ORDER — HYDROMORPHONE HYDROCHLORIDE 2 MG/ML
0.4 INJECTION, SOLUTION INTRAMUSCULAR; INTRAVENOUS; SUBCUTANEOUS EVERY 5 MIN PRN
Status: DISCONTINUED | OUTPATIENT
Start: 2021-05-10 | End: 2021-05-10 | Stop reason: HOSPADM

## 2021-05-10 RX ORDER — DIPHENHYDRAMINE HYDROCHLORIDE 50 MG/ML
12.5 INJECTION INTRAMUSCULAR; INTRAVENOUS EVERY 30 MIN PRN
Status: DISCONTINUED | OUTPATIENT
Start: 2021-05-10 | End: 2021-05-10 | Stop reason: HOSPADM

## 2021-05-10 RX ORDER — LIDOCAINE HCL/PF 100 MG/5ML
SYRINGE (ML) INTRAVENOUS
Status: DISCONTINUED | OUTPATIENT
Start: 2021-05-10 | End: 2021-05-10

## 2021-05-10 RX ORDER — SODIUM CHLORIDE 9 MG/ML
INJECTION, SOLUTION INTRAVENOUS CONTINUOUS
Status: DISCONTINUED | OUTPATIENT
Start: 2021-05-10 | End: 2021-05-10 | Stop reason: HOSPADM

## 2021-05-10 RX ORDER — FENTANYL CITRATE 50 UG/ML
INJECTION, SOLUTION INTRAMUSCULAR; INTRAVENOUS
Status: DISCONTINUED | OUTPATIENT
Start: 2021-05-10 | End: 2021-05-10

## 2021-05-10 RX ORDER — DIPHENHYDRAMINE HYDROCHLORIDE 50 MG/ML
25 INJECTION INTRAMUSCULAR; INTRAVENOUS EVERY 4 HOURS PRN
Status: DISCONTINUED | OUTPATIENT
Start: 2021-05-10 | End: 2021-05-10 | Stop reason: HOSPADM

## 2021-05-10 RX ORDER — SODIUM CHLORIDE 0.9 % (FLUSH) 0.9 %
3 SYRINGE (ML) INJECTION
Status: DISCONTINUED | OUTPATIENT
Start: 2021-05-10 | End: 2021-05-10 | Stop reason: HOSPADM

## 2021-05-10 RX ORDER — ONDANSETRON HYDROCHLORIDE 2 MG/ML
INJECTION, SOLUTION INTRAMUSCULAR; INTRAVENOUS
Status: DISCONTINUED | OUTPATIENT
Start: 2021-05-10 | End: 2021-05-10

## 2021-05-10 RX ORDER — IBUPROFEN 800 MG/1
800 TABLET ORAL EVERY 8 HOURS PRN
Qty: 30 TABLET | Refills: 0 | Status: SHIPPED | OUTPATIENT
Start: 2021-05-10 | End: 2022-04-01

## 2021-05-10 RX ORDER — DIPHENHYDRAMINE HCL 25 MG
25 CAPSULE ORAL EVERY 4 HOURS PRN
Status: DISCONTINUED | OUTPATIENT
Start: 2021-05-10 | End: 2021-05-10 | Stop reason: HOSPADM

## 2021-05-10 RX ORDER — MIDAZOLAM HYDROCHLORIDE 1 MG/ML
INJECTION INTRAMUSCULAR; INTRAVENOUS
Status: DISCONTINUED | OUTPATIENT
Start: 2021-05-10 | End: 2021-05-10

## 2021-05-10 RX ORDER — MUPIROCIN 20 MG/G
OINTMENT TOPICAL
Status: DISCONTINUED | OUTPATIENT
Start: 2021-05-10 | End: 2021-05-10 | Stop reason: HOSPADM

## 2021-05-10 RX ORDER — IBUPROFEN 600 MG/1
600 TABLET ORAL EVERY 6 HOURS PRN
Status: DISCONTINUED | OUTPATIENT
Start: 2021-05-10 | End: 2021-05-10 | Stop reason: HOSPADM

## 2021-05-10 RX ORDER — PROPOFOL 10 MG/ML
VIAL (ML) INTRAVENOUS CONTINUOUS PRN
Status: DISCONTINUED | OUTPATIENT
Start: 2021-05-10 | End: 2021-05-10

## 2021-05-10 RX ORDER — ONDANSETRON 2 MG/ML
4 INJECTION INTRAMUSCULAR; INTRAVENOUS DAILY PRN
Status: DISCONTINUED | OUTPATIENT
Start: 2021-05-10 | End: 2021-05-10 | Stop reason: HOSPADM

## 2021-05-10 RX ORDER — HYDROCODONE BITARTRATE AND ACETAMINOPHEN 5; 325 MG/1; MG/1
1 TABLET ORAL EVERY 4 HOURS PRN
Qty: 5 TABLET | Refills: 0 | Status: SHIPPED | OUTPATIENT
Start: 2021-05-10 | End: 2022-04-01

## 2021-05-10 RX ORDER — OXYCODONE HYDROCHLORIDE 5 MG/1
5 TABLET ORAL
Status: DISCONTINUED | OUTPATIENT
Start: 2021-05-10 | End: 2021-05-10 | Stop reason: HOSPADM

## 2021-05-10 RX ORDER — ACETAMINOPHEN 10 MG/ML
INJECTION, SOLUTION INTRAVENOUS
Status: DISCONTINUED | OUTPATIENT
Start: 2021-05-10 | End: 2021-05-10

## 2021-05-10 RX ADMIN — IBUPROFEN 800 MG: 800 INJECTION INTRAVENOUS at 12:05

## 2021-05-10 RX ADMIN — DEXAMETHASONE SODIUM PHOSPHATE 8 MG: 4 INJECTION, SOLUTION INTRAMUSCULAR; INTRAVENOUS at 12:05

## 2021-05-10 RX ADMIN — PROPOFOL 150 MG: 10 INJECTION, EMULSION INTRAVENOUS at 12:05

## 2021-05-10 RX ADMIN — LIDOCAINE HYDROCHLORIDE 50 MG: 20 INJECTION, SOLUTION INTRAVENOUS at 12:05

## 2021-05-10 RX ADMIN — CARBOXYMETHYLCELLULOSE SODIUM 2 DROP: 2.5 SOLUTION/ DROPS OPHTHALMIC at 12:05

## 2021-05-10 RX ADMIN — DOXYCYCLINE 200 MG: 100 INJECTION, POWDER, LYOPHILIZED, FOR SOLUTION INTRAVENOUS at 12:05

## 2021-05-10 RX ADMIN — PROPOFOL 150 MCG/KG/MIN: 10 INJECTION, EMULSION INTRAVENOUS at 12:05

## 2021-05-10 RX ADMIN — ONDANSETRON 4 MG: 2 INJECTION, SOLUTION INTRAMUSCULAR; INTRAVENOUS at 12:05

## 2021-05-10 RX ADMIN — MIDAZOLAM HYDROCHLORIDE 2 MG: 1 INJECTION, SOLUTION INTRAMUSCULAR; INTRAVENOUS at 11:05

## 2021-05-10 RX ADMIN — FENTANYL CITRATE 50 MCG: 50 INJECTION, SOLUTION INTRAMUSCULAR; INTRAVENOUS at 12:05

## 2021-05-10 RX ADMIN — SCOPALAMINE 1 PATCH: 1 PATCH, EXTENDED RELEASE TRANSDERMAL at 11:05

## 2021-05-10 RX ADMIN — ACETAMINOPHEN 1000 MG: 10 INJECTION, SOLUTION INTRAVENOUS at 12:05

## 2021-05-10 RX ADMIN — MUPIROCIN: 20 OINTMENT TOPICAL at 11:05

## 2021-05-12 ENCOUNTER — TELEPHONE (OUTPATIENT)
Dept: OBSTETRICS AND GYNECOLOGY | Facility: CLINIC | Age: 31
End: 2021-05-12

## 2021-05-13 LAB
FINAL PATHOLOGIC DIAGNOSIS: NORMAL
GROSS: NORMAL
Lab: NORMAL

## 2021-05-24 ENCOUNTER — OFFICE VISIT (OUTPATIENT)
Dept: OBSTETRICS AND GYNECOLOGY | Facility: CLINIC | Age: 31
End: 2021-05-24
Payer: OTHER GOVERNMENT

## 2021-05-24 VITALS
SYSTOLIC BLOOD PRESSURE: 110 MMHG | DIASTOLIC BLOOD PRESSURE: 60 MMHG | WEIGHT: 172.38 LBS | BODY MASS INDEX: 33.84 KG/M2 | HEIGHT: 60 IN

## 2021-05-24 DIAGNOSIS — Z48.89 POSTOPERATIVE VISIT: Primary | ICD-10-CM

## 2021-05-24 PROCEDURE — 99999 PR PBB SHADOW E&M-EST. PATIENT-LVL III: CPT | Mod: PBBFAC,,, | Performed by: OBSTETRICS & GYNECOLOGY

## 2021-05-24 PROCEDURE — 99999 PR PBB SHADOW E&M-EST. PATIENT-LVL III: ICD-10-PCS | Mod: PBBFAC,,, | Performed by: OBSTETRICS & GYNECOLOGY

## 2021-05-24 PROCEDURE — 99213 OFFICE O/P EST LOW 20 MIN: CPT | Mod: PBBFAC | Performed by: OBSTETRICS & GYNECOLOGY

## 2021-05-24 PROCEDURE — 99024 PR POST-OP FOLLOW-UP VISIT: ICD-10-PCS | Mod: S$PBB,,, | Performed by: OBSTETRICS & GYNECOLOGY

## 2021-05-24 PROCEDURE — 99024 POSTOP FOLLOW-UP VISIT: CPT | Mod: S$PBB,,, | Performed by: OBSTETRICS & GYNECOLOGY

## 2021-06-01 ENCOUNTER — PATIENT MESSAGE (OUTPATIENT)
Dept: OBSTETRICS AND GYNECOLOGY | Facility: CLINIC | Age: 31
End: 2021-06-01

## 2021-06-02 ENCOUNTER — TELEPHONE (OUTPATIENT)
Dept: OBSTETRICS AND GYNECOLOGY | Facility: CLINIC | Age: 31
End: 2021-06-02

## 2021-07-22 ENCOUNTER — PATIENT MESSAGE (OUTPATIENT)
Dept: OBSTETRICS AND GYNECOLOGY | Facility: CLINIC | Age: 31
End: 2021-07-22

## 2021-07-23 RX ORDER — SERTRALINE HYDROCHLORIDE 50 MG/1
50 TABLET, FILM COATED ORAL DAILY
Qty: 30 TABLET | Refills: 2 | Status: SHIPPED | OUTPATIENT
Start: 2021-07-23 | End: 2021-10-19

## 2021-08-05 ENCOUNTER — PATIENT MESSAGE (OUTPATIENT)
Dept: OBSTETRICS AND GYNECOLOGY | Facility: CLINIC | Age: 31
End: 2021-08-05

## 2021-08-11 ENCOUNTER — TELEPHONE (OUTPATIENT)
Dept: OBSTETRICS AND GYNECOLOGY | Facility: CLINIC | Age: 31
End: 2021-08-11

## 2021-08-11 ENCOUNTER — OFFICE VISIT (OUTPATIENT)
Dept: OBSTETRICS AND GYNECOLOGY | Facility: CLINIC | Age: 31
End: 2021-08-11
Payer: OTHER GOVERNMENT

## 2021-08-11 DIAGNOSIS — Z31.41 FERTILITY TESTING: Primary | ICD-10-CM

## 2021-08-11 PROCEDURE — 99213 PR OFFICE/OUTPT VISIT, EST, LEVL III, 20-29 MIN: ICD-10-PCS | Mod: 95,,, | Performed by: OBSTETRICS & GYNECOLOGY

## 2021-08-11 PROCEDURE — 99213 OFFICE O/P EST LOW 20 MIN: CPT | Mod: 95,,, | Performed by: OBSTETRICS & GYNECOLOGY

## 2021-08-17 ENCOUNTER — LAB VISIT (OUTPATIENT)
Dept: LAB | Facility: OTHER | Age: 31
End: 2021-08-17
Attending: OBSTETRICS & GYNECOLOGY
Payer: OTHER GOVERNMENT

## 2021-08-17 ENCOUNTER — PATIENT MESSAGE (OUTPATIENT)
Dept: OBSTETRICS AND GYNECOLOGY | Facility: CLINIC | Age: 31
End: 2021-08-17

## 2021-08-17 DIAGNOSIS — Z32.01 POSITIVE PREGNANCY TEST: Primary | ICD-10-CM

## 2021-08-17 DIAGNOSIS — Z32.01 POSITIVE PREGNANCY TEST: ICD-10-CM

## 2021-08-17 LAB — HCG INTACT+B SERPL-ACNC: 2.4 MIU/ML

## 2021-08-17 PROCEDURE — 84702 CHORIONIC GONADOTROPIN TEST: CPT | Performed by: OBSTETRICS & GYNECOLOGY

## 2021-08-17 PROCEDURE — 36415 COLL VENOUS BLD VENIPUNCTURE: CPT | Performed by: OBSTETRICS & GYNECOLOGY

## 2021-08-18 ENCOUNTER — PATIENT MESSAGE (OUTPATIENT)
Dept: OBSTETRICS AND GYNECOLOGY | Facility: CLINIC | Age: 31
End: 2021-08-18

## 2021-09-07 ENCOUNTER — LAB VISIT (OUTPATIENT)
Dept: LAB | Facility: OTHER | Age: 31
End: 2021-09-07
Attending: OBSTETRICS & GYNECOLOGY
Payer: OTHER GOVERNMENT

## 2021-09-07 DIAGNOSIS — Z31.41 FERTILITY TESTING: ICD-10-CM

## 2021-09-07 LAB — PROGEST SERPL-MCNC: 8.5 NG/ML

## 2021-09-07 PROCEDURE — 84144 ASSAY OF PROGESTERONE: CPT | Performed by: OBSTETRICS & GYNECOLOGY

## 2021-09-07 PROCEDURE — 36415 COLL VENOUS BLD VENIPUNCTURE: CPT | Performed by: OBSTETRICS & GYNECOLOGY

## 2021-09-08 ENCOUNTER — PATIENT MESSAGE (OUTPATIENT)
Dept: OBSTETRICS AND GYNECOLOGY | Facility: CLINIC | Age: 31
End: 2021-09-08

## 2021-09-09 ENCOUNTER — PATIENT MESSAGE (OUTPATIENT)
Dept: OBSTETRICS AND GYNECOLOGY | Facility: CLINIC | Age: 31
End: 2021-09-09

## 2022-01-04 ENCOUNTER — PATIENT MESSAGE (OUTPATIENT)
Dept: OBSTETRICS AND GYNECOLOGY | Facility: CLINIC | Age: 32
End: 2022-01-04
Payer: OTHER GOVERNMENT

## 2022-04-01 ENCOUNTER — OFFICE VISIT (OUTPATIENT)
Dept: OBSTETRICS AND GYNECOLOGY | Facility: CLINIC | Age: 32
End: 2022-04-01
Payer: OTHER GOVERNMENT

## 2022-04-01 VITALS
DIASTOLIC BLOOD PRESSURE: 78 MMHG | SYSTOLIC BLOOD PRESSURE: 98 MMHG | HEIGHT: 60 IN | WEIGHT: 175.5 LBS | BODY MASS INDEX: 34.46 KG/M2

## 2022-04-01 DIAGNOSIS — Z01.419 ENCOUNTER FOR ANNUAL ROUTINE GYNECOLOGICAL EXAMINATION: Primary | ICD-10-CM

## 2022-04-01 PROCEDURE — 99999 PR PBB SHADOW E&M-EST. PATIENT-LVL III: CPT | Mod: PBBFAC,,, | Performed by: OBSTETRICS & GYNECOLOGY

## 2022-04-01 PROCEDURE — 99213 OFFICE O/P EST LOW 20 MIN: CPT | Mod: PBBFAC | Performed by: OBSTETRICS & GYNECOLOGY

## 2022-04-01 PROCEDURE — 88175 CYTOPATH C/V AUTO FLUID REDO: CPT | Performed by: OBSTETRICS & GYNECOLOGY

## 2022-04-01 PROCEDURE — 87625 HPV TYPES 16 & 18 ONLY: CPT | Mod: 59 | Performed by: OBSTETRICS & GYNECOLOGY

## 2022-04-01 PROCEDURE — 99395 PREV VISIT EST AGE 18-39: CPT | Mod: S$PBB,,, | Performed by: OBSTETRICS & GYNECOLOGY

## 2022-04-01 PROCEDURE — 99395 PR PREVENTIVE VISIT,EST,18-39: ICD-10-PCS | Mod: S$PBB,,, | Performed by: OBSTETRICS & GYNECOLOGY

## 2022-04-01 PROCEDURE — 99999 PR PBB SHADOW E&M-EST. PATIENT-LVL III: ICD-10-PCS | Mod: PBBFAC,,, | Performed by: OBSTETRICS & GYNECOLOGY

## 2022-04-01 PROCEDURE — 87624 HPV HI-RISK TYP POOLED RSLT: CPT | Performed by: OBSTETRICS & GYNECOLOGY

## 2022-04-01 NOTE — PROGRESS NOTES
SUBJECTIVE:     Chief Complaint: Well Woman (Had another missed carriage on 3/17. Doing fertility treatment with Dr. Koch )       History of Present Illness:  Annual Exam  Patient presents for annual exam.   She c/o nothing today. Had Sab 3/17/2022 from IUI. Had 2 failed IUIS prior. Had D&C for missed ab 2021. 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 Coping well overall. Trended HCG with Dr. Koch and per patient were negative beginning of this week.   LMP: 22  She denies any vd, vb, dyspareunia, dysuria, depression, anxiety.  Last pap was in  and was ASCUS. HPV none, due for cotest.  Birth Control: none  declines STD testing.     GYN screening history: denies  Mammogram history: na  Colonoscopy history: na  Dexa history: na    FH:   Breast cancer: none  Colon cancer: none  Ovarian cancer: none    Review of patient's allergies indicates:   Allergen Reactions    Codeine Nausea And Vomiting     Can take other narcotics with nausea meds    Latex, natural rubber Rash       Past Medical History:   Diagnosis Date    PONV (postoperative nausea and vomiting)      Past Surgical History:   Procedure Laterality Date    DILATION AND CURETTAGE OF UTERUS USING SUCTION N/A 5/10/2021    Procedure: DILATION AND CURETTAGE, UTERUS, USING SUCTION;  Surgeon: Julie R. Jeansonne, MD;  Location: [facility];  Service: OB/GYN;  Laterality: N/A;    EYE SURGERY  2018     OB History        0    Para        Term                AB        Living   0       SAB        IAB        Ectopic        Multiple        Live Births   0           Obstetric Comments   Gynhx: 10/reg.  OCP -   Denies std,   Denies abnl, Pap 2016 NEG  S/p gardasil           Family History   Problem Relation Age of Onset    Diabetes Other      Social History     Tobacco Use    Smoking status: Never Smoker    Smokeless tobacco: Never Used   Substance Use Topics    Alcohol use: No    Drug use: No       Current  Outpatient Medications   Medication Sig    prenatal 25-iron-folate 6-dha 30 mg iron-1mg -200 mg Cap Take by mouth.    sertraline (ZOLOFT) 50 MG tablet TAKE 1 TABLET(50 MG) BY MOUTH EVERY DAY     No current facility-administered medications for this visit.       Review of Systems:  GENERAL: No fever, chills, fatigability or weight loss.  CARDIOVASCULAR: No chest pain. No palpitations.  RESPIRATORY: No SOB, no wheezing.  BREAST: Denies pain. No lumps. No discharge.  VULVAR: No pain, no lesions and no itching.  VAGINAL: No relaxation, no itching, no discharge, no abnormal bleeding and no lesions.  ABDOMEN: No abdominal pain. Denies nausea. Denies vomiting. No diarrhea. No constipation  URINARY: No incontinence, no nocturia, no frequency and no dysuria.  NEUROLOGICAL: No headaches. No vision changes.       OBJECTIVE:     Vitals:    04/01/22 0835   BP: 98/78       Patient verbally consents to pelvic and breast exams.  Physical Exam:  Gen: NAD, well developed, well-nourished  HEENT: Normocephalic, atraumatic  Eyes: EOM nl, conjuntivae normal  Neck: ROM normal, no thyromegaly  Respiratory: Effort normal   Abd: soft, nontender, no masses palpated  Breast: Normal bilaterally, no masses, lesions or tenderness. No nipple discharge on expression, no lymphadenopathy bilaterally.  SSE:  Vulva: no lesions or rashes  Cervix: No lesions noted, nonfriable, no vaginal discharge or vaginal bleeding noted  BME:   Cervix: No CMT  Adnexa: nl bilaterally, no masses or fullness palpated  Uterus: normal, nonenlarged  Musculoskeletal: normal ROM  Neuro: alert, AAOx3  Skin: warm and dry  Psych: mood/affect nl, behavior normal, judgement normal, thought content normal        ASSESSMENT:       ICD-10-CM ICD-9-CM    1. Encounter for annual routine gynecological examination  Z01.419 V72.31 Liquid-Based Pap Smear, Screening      HPV High Risk Genotypes, PCR          Plan:      Isabelle was seen today for well woman.    Diagnoses and all orders for  this visit:    Encounter for annual routine gynecological examination  -     Liquid-Based Pap Smear, Screening  -     HPV High Risk Genotypes, PCR        Orders Placed This Encounter   Procedures    HPV High Risk Genotypes, PCR       Follow up in one year for annual, or prn.    Julie R Jeansonne

## 2022-04-07 ENCOUNTER — PATIENT MESSAGE (OUTPATIENT)
Dept: OBSTETRICS AND GYNECOLOGY | Facility: CLINIC | Age: 32
End: 2022-04-07
Payer: OTHER GOVERNMENT

## 2022-04-07 LAB
CLINICAL INFO: NORMAL
CYTO CVX: NORMAL
CYTOLOGIST CVX/VAG CYTO: NORMAL
CYTOLOGIST CVX/VAG CYTO: NORMAL
CYTOLOGY CMNT CVX/VAG CYTO-IMP: NORMAL
CYTOLOGY PAP THIN PREP EXPLANATION: NORMAL
DATE OF PREVIOUS PAP: NORMAL
DATE PREVIOUS BX: NO
GEN CATEG CVX/VAG CYTO-IMP: NORMAL
HPV I/H RISK 4 DNA CVX QL NAA+PROBE: DETECTED
HPV16 DNA CVX QL PROBE+SIG AMP: NOT DETECTED
HPV18 DNA CVX QL PROBE+SIG AMP: NOT DETECTED
LMP START DATE: NORMAL
MICROORGANISM CVX/VAG CYTO: NORMAL
PATHOLOGIST CVX/VAG CYTO: NORMAL
SERVICE CMNT-IMP: NORMAL
SPECIMEN SOURCE CVX/VAG CYTO: NORMAL
STAT OF ADQ CVX/VAG CYTO-IMP: NORMAL

## 2022-04-11 ENCOUNTER — TELEPHONE (OUTPATIENT)
Dept: OBSTETRICS AND GYNECOLOGY | Facility: CLINIC | Age: 32
End: 2022-04-11
Payer: OTHER GOVERNMENT

## 2022-04-11 NOTE — TELEPHONE ENCOUNTER
----- Message from Julie R. Jeansonne, MD sent at 4/7/2022  5:54 PM CDT -----  Please contact to schedule sylvain. TY.

## 2022-04-25 ENCOUNTER — PROCEDURE VISIT (OUTPATIENT)
Dept: OBSTETRICS AND GYNECOLOGY | Facility: CLINIC | Age: 32
End: 2022-04-25
Payer: OTHER GOVERNMENT

## 2022-04-25 VITALS
SYSTOLIC BLOOD PRESSURE: 132 MMHG | WEIGHT: 179.69 LBS | BODY MASS INDEX: 35.28 KG/M2 | DIASTOLIC BLOOD PRESSURE: 82 MMHG | HEIGHT: 60 IN

## 2022-04-25 DIAGNOSIS — Z32.02 NEGATIVE PREGNANCY TEST: ICD-10-CM

## 2022-04-25 DIAGNOSIS — Z87.42 HISTORY OF ABNORMAL CERVICAL PAP SMEAR: ICD-10-CM

## 2022-04-25 DIAGNOSIS — B97.7 HPV IN FEMALE: Primary | ICD-10-CM

## 2022-04-25 LAB
B-HCG UR QL: NEGATIVE
CTP QC/QA: YES

## 2022-04-25 PROCEDURE — 88342 CHG IMMUNOCYTOCHEMISTRY: ICD-10-PCS | Mod: 26,,, | Performed by: PATHOLOGY

## 2022-04-25 PROCEDURE — 88305 TISSUE EXAM BY PATHOLOGIST: ICD-10-PCS | Mod: 26,,, | Performed by: PATHOLOGY

## 2022-04-25 PROCEDURE — 88305 TISSUE EXAM BY PATHOLOGIST: CPT | Mod: 26,,, | Performed by: PATHOLOGY

## 2022-04-25 PROCEDURE — 88342 IMHCHEM/IMCYTCHM 1ST ANTB: CPT | Mod: 26,,, | Performed by: PATHOLOGY

## 2022-04-25 PROCEDURE — 88342 IMHCHEM/IMCYTCHM 1ST ANTB: CPT | Performed by: PATHOLOGY

## 2022-04-25 PROCEDURE — 57454 COLPOSCOPY: ICD-10-PCS | Mod: S$PBB,,, | Performed by: OBSTETRICS & GYNECOLOGY

## 2022-04-25 PROCEDURE — 88305 TISSUE EXAM BY PATHOLOGIST: CPT | Performed by: PATHOLOGY

## 2022-04-25 PROCEDURE — 81025 URINE PREGNANCY TEST: CPT | Mod: PBBFAC | Performed by: OBSTETRICS & GYNECOLOGY

## 2022-04-25 PROCEDURE — 57454 BX/CURETT OF CERVIX W/SCOPE: CPT | Mod: PBBFAC | Performed by: OBSTETRICS & GYNECOLOGY

## 2022-04-25 NOTE — PROCEDURES
Colposcopy    Date/Time: 4/25/2022 2:30 PM  Performed by: Julie R. Jeansonne, MD  Authorized by: Julie R. Jeansonne, MD     Consent Done?:  Yes (Written)  Timeout:Immediately prior to procedure a time out was called to verify the correct patient, procedure, equipment, support staff and site/side marked as required  Prep:Patient was prepped and draped in the usual sterile fashion  Assistants?: Yes    List of assistants:  Aniya MCMULLEN was present for the entire procedure.    Colposcopy Site:  Cervix  Position:  Supine   Patient was prepped and draped in the normal sterile fashion.  Acrowhite Lesion: No    Atypical Vessels: No    Transformation Zone Adequate?: Yes    Biopsy?: Yes         Location:  Cervix ((6 00))  ECC Performed?: Yes    LEEP Performed?: No    Estimated blood loss (cc):  1   Patient tolerated the procedure well with no immediate complications.   Post-operative instructions were provided for the patient.   Patient was discharged and will follow up if any complications occur     HPV HR +, ASCUS last year

## 2022-05-04 LAB
FINAL PATHOLOGIC DIAGNOSIS: NORMAL
Lab: NORMAL

## 2022-06-01 ENCOUNTER — PATIENT MESSAGE (OUTPATIENT)
Dept: OBSTETRICS AND GYNECOLOGY | Facility: CLINIC | Age: 32
End: 2022-06-01
Payer: OTHER GOVERNMENT

## 2022-06-02 DIAGNOSIS — F41.9 ANXIETY: Primary | ICD-10-CM

## 2022-07-10 ENCOUNTER — PATIENT MESSAGE (OUTPATIENT)
Dept: PSYCHIATRY | Facility: CLINIC | Age: 32
End: 2022-07-10
Payer: OTHER GOVERNMENT

## 2022-07-11 ENCOUNTER — OFFICE VISIT (OUTPATIENT)
Dept: PSYCHIATRY | Facility: CLINIC | Age: 32
End: 2022-07-11
Payer: OTHER GOVERNMENT

## 2022-07-11 ENCOUNTER — PATIENT MESSAGE (OUTPATIENT)
Dept: PSYCHIATRY | Facility: CLINIC | Age: 32
End: 2022-07-11

## 2022-07-11 DIAGNOSIS — F41.1 GAD (GENERALIZED ANXIETY DISORDER): Primary | ICD-10-CM

## 2022-07-11 DIAGNOSIS — F41.9 ANXIETY: ICD-10-CM

## 2022-07-11 PROCEDURE — 90792 PSYCH DIAG EVAL W/MED SRVCS: CPT | Mod: 95,,, | Performed by: PSYCHIATRY & NEUROLOGY

## 2022-07-11 PROCEDURE — 90792 PR PSYCHIATRIC DIAGNOSTIC EVALUATION W/MEDICAL SERVICES: ICD-10-PCS | Mod: 95,,, | Performed by: PSYCHIATRY & NEUROLOGY

## 2022-07-13 ENCOUNTER — PATIENT MESSAGE (OUTPATIENT)
Dept: PSYCHIATRY | Facility: CLINIC | Age: 32
End: 2022-07-13
Payer: OTHER GOVERNMENT

## 2022-07-13 RX ORDER — LAMOTRIGINE 25 MG/1
TABLET ORAL
Qty: 42 TABLET | Refills: 0 | Status: SHIPPED | OUTPATIENT
Start: 2022-07-13 | End: 2022-08-08

## 2022-07-13 RX ORDER — SERTRALINE HYDROCHLORIDE 25 MG/1
25 TABLET, FILM COATED ORAL DAILY
Qty: 30 TABLET | Refills: 2 | Status: SHIPPED | OUTPATIENT
Start: 2022-07-13 | End: 2022-08-12

## 2022-07-13 NOTE — PROGRESS NOTES
"PSYCHIATRIC EVALUATION- VIRTUAL    Name: Isabelle Anderson  Age: 31 y.o. 1990  Date of Encounter: 7/11/2022    Sources of Information:  Patient    Chief Complaint:  "Ever since I went off birth control, I get irritable and emotional very quickly"    History of Present Illness   Ms. Anderson is a 31 y.o. year old woman with a medical history of miscarriages and fertility issues and who presents to the clinic for irritability since coming off of birth control.    She and Ramiro have been  2.5 years. They have had two miscarriages. IVF is the next step. She has heavy flow and bad cramps. Without birth control, she feels more emotional and irritable. She wants to be on medications that will be safe for pregnancy. She was started on Zoloft 50mg, but she does not like the side effects. She feels drowsy and dizzy when taking the Zoloft in the morning. She has noticed food aversion and "constantly have to stretch my feet and hands."    She tends to overthink. She worries a lot about housework and work. She tends to chew her cheeks, play with her hair, and pick at her nails. She is bad at following through on projects. She describes herself as the "[place] of Procrastination." She is constantly losing her phone. She is on her phone too much. She has finished five books in 2 months. She is better with short chapters. She had trouble concentrating in high school and college. She has trouble now with concentration and motivation.     Patient denies SI, HI, AVH.    Measures   PHQ9 Score:  8/27  GAD7 Score:   7/21    Prescription Monitoring Program  Reviewed.    Psychiatric History  Diagnoses:     No  Inpatient:        No  Outpatient:        Saw a therapist in 2017 after a bad break up for two to three sessions.  Medication Trials:      Only Zoloft  Self-Harm:       No  Suicide Attempts:     No     Substance Use History  Tobacco:       No  Alcohol:       Currently drinks 1 drink in one sitting. Estimates drinking 1 days " "in the past month.  Caffeine:    Not asked  Recreational Drugs:      No  Previous CD Treatment:    No    Medical History  Past Medical History:   Diagnosis Date    PONV (postoperative nausea and vomiting)        PCP:     Dr. Yoko Valdez  Head Injury    No  Seizure    No    Surgical History  Past Surgical History:   Procedure Laterality Date    DILATION AND CURETTAGE OF UTERUS USING SUCTION N/A 5/10/2021    Procedure: DILATION AND CURETTAGE, UTERUS, USING SUCTION;  Surgeon: Julie R. Jeansonne, MD;  Location: [facility];  Service: OB/GYN;  Laterality: N/A;    EYE SURGERY  05/2018       Current Medications  EScitalopram oxalate, loratadine, and prenatal 25-iron-folate 6-dha    Allergies: Codeine and Latex, natural rubber    Family Psychiatric History  Suicide attempts:     Denies  Substance abuse:     Denies  Diagnoses:      Denies  Sudden cardiac death before 49yo: Denies    Social History  Born:      Born in [place].   Childhood:      Raised in [place] by parents. Describes childhood as "good-moved a lot due to dad in ."  Relationships:   to  Ramiro  Children:   No  Living situation:    house in [place] with spouse. They recently purchased the house.  Education History:   Highest level of schooling is college degree. Special Ed: No. Repeated grades: No. Suspensions: No.  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  is in the   Legal History:     No  Firearms Access:   Yes- locked and stored  History of Abuse/Trauma:   No      Psychiatric Review of Systems  Deborah: Denies periods with decreased need for sleep, elated mood, increased energy.  Psychosis: Denies auditory/visual hallucinations, paranoia, and delusions.    OCD: Denies obsessions and compulsions.  PTSD: Denies experiencing a traumatic event or witnessing an event.  ADHD: had ADHD symptoms in childhood  Eating Disorders: Denies history of restricting, binging, purging " behavior.    Medical Review of Systems  Pertinent items are noted in HPI.    PHYSICAL EXAM:  Vitals: Pulse 74, height 5' (1.524 m), weight 79.4 kg (175 lb).    Labs:   No recent labs.    No head imaging.      MENTAL STATUS EXAM  General:  Alert and oriented x 4 Appearance is good grooming and hygiene, appears stated age, Body mass index is 34.18 kg/m². . Behavior: friendly and cooperative, eye contact normal.  Gait, Posture and Muscle Strength/Tone: Normal posture observed while watching patient walk to exam room. No gross abnormal movements noted.  Psychomotor Agitation and Retardation: none evident  Speech: Normal rate, volume, articulation, and tone.  Mood: denies depression  Affect: full  Thought Process: Linear and coherent. No flight of ideas.  Associations:  Intact. No loosening of associations.  Thought content: Denies suicidal and homicidal thoughts, plans and intentions.  Perceptions: Denies any auditory or visual hallucinations. Does not appear internally preoccupied.  Orientation: Alert and oriented to person, place, date and situation  Attention and Concentration: Intact.   Memory: Intact grossly   Language: No deficits noted   Fund of Knowledge: appropriate for age and level and education.   Insight:   good  Judgment: good  Impulse control: good    SUMMARY   Ms. Anderson is a 31 y.o. year old woman with a medical history of miscarriages and fertility issues and who presents to the clinic for irritability since coming off of birth control. She also has issues with motivation and procrastination. Once anxiety and irritability are better controlled, will see if nervous habits and procrastination/motivation improve. If not, will do ADHD evaluation. ADHD treatment options may be limited due to patient trying to conceive. Will decrease Zoloft to 12.5mg daily and add lamictal for irritability. She is cautioned about risk for SJS.    DIAGNOSTIC IMPRESSION   Problem List Items Addressed This Visit         Psychiatric    LEBRON (generalized anxiety disorder) - Primary    Relevant Orders    CBC Without Differential (Completed)    Comprehensive Metabolic Panel (Completed)    Folate (Completed)    TSH (Completed)    Vitamin B12 (Completed)    Vitamin D (Completed)      Other Visit Diagnoses     Anxiety              PLAN  Patient Instructions   Zoloft 12.5mg daily  Lamictal 25mg for two weeks, then increase to 50mg for two weeks.  Labs    Follow up in 4 weeks.        Janell Ruiz  [facility] - PSYCHIATRY    Today's encounter took a total time of 60 minutes, and that time included patient interview, documentation, ordering medication.     Due to the pandemic, today's session was performed over [facility]. Patient is confirmed to be in the [facility]. The participants are patient and myself.    Risks, Benefits, Side Effects and Alternatives were discussed with the patient today and consent was obtained for the current medication regimen. The patient was encouraged to ask questions and these questions were answered and the patient was engaged in psychoeducation regarding diagnosis, course of illness, accuracy of the diagnosis, and other general elements regarding overall diagnosis and treatment consideration.     Any medications being used off-label were discussed with the patient inclusive of the evidence base for the use of the medications and consent was obtained for the off-label use of the medication.     Brief suicide risk assessment was performed with the patient today and safety planning was performed if indicated.    Note completed by: Janell Ruiz MD, 8/12/2022 12:38 PM

## 2022-07-14 ENCOUNTER — PATIENT MESSAGE (OUTPATIENT)
Dept: PSYCHIATRY | Facility: CLINIC | Age: 32
End: 2022-07-14
Payer: OTHER GOVERNMENT

## 2022-07-22 ENCOUNTER — PATIENT MESSAGE (OUTPATIENT)
Dept: PSYCHIATRY | Facility: CLINIC | Age: 32
End: 2022-07-22
Payer: OTHER GOVERNMENT

## 2022-07-25 ENCOUNTER — PATIENT MESSAGE (OUTPATIENT)
Dept: PSYCHIATRY | Facility: CLINIC | Age: 32
End: 2022-07-25

## 2022-07-25 ENCOUNTER — OFFICE VISIT (OUTPATIENT)
Dept: PSYCHIATRY | Facility: CLINIC | Age: 32
End: 2022-07-25
Payer: OTHER GOVERNMENT

## 2022-07-25 ENCOUNTER — LAB VISIT (OUTPATIENT)
Dept: LAB | Facility: OTHER | Age: 32
End: 2022-07-25
Attending: PSYCHIATRY & NEUROLOGY
Payer: OTHER GOVERNMENT

## 2022-07-25 VITALS — HEART RATE: 76 BPM | BODY MASS INDEX: 34.18 KG/M2 | WEIGHT: 175 LBS

## 2022-07-25 DIAGNOSIS — F41.1 GAD (GENERALIZED ANXIETY DISORDER): Primary | ICD-10-CM

## 2022-07-25 DIAGNOSIS — F41.1 GAD (GENERALIZED ANXIETY DISORDER): ICD-10-CM

## 2022-07-25 LAB
25(OH)D3+25(OH)D2 SERPL-MCNC: 47 NG/ML (ref 30–96)
ALBUMIN SERPL BCP-MCNC: 4.3 G/DL (ref 3.5–5.2)
ALP SERPL-CCNC: 62 U/L (ref 55–135)
ALT SERPL W/O P-5'-P-CCNC: 29 U/L (ref 10–44)
ANION GAP SERPL CALC-SCNC: 13 MMOL/L (ref 8–16)
AST SERPL-CCNC: 21 U/L (ref 10–40)
BILIRUB SERPL-MCNC: 0.6 MG/DL (ref 0.1–1)
BUN SERPL-MCNC: 15 MG/DL (ref 6–20)
CALCIUM SERPL-MCNC: 9.9 MG/DL (ref 8.7–10.5)
CHLORIDE SERPL-SCNC: 107 MMOL/L (ref 95–110)
CO2 SERPL-SCNC: 22 MMOL/L (ref 23–29)
CREAT SERPL-MCNC: 0.8 MG/DL (ref 0.5–1.4)
ERYTHROCYTE [DISTWIDTH] IN BLOOD BY AUTOMATED COUNT: 12.6 % (ref 11.5–14.5)
EST. GFR  (AFRICAN AMERICAN): >60 ML/MIN/1.73 M^2
EST. GFR  (NON AFRICAN AMERICAN): >60 ML/MIN/1.73 M^2
FOLATE SERPL-MCNC: 18 NG/ML (ref 4–24)
GLUCOSE SERPL-MCNC: 104 MG/DL (ref 70–110)
HCT VFR BLD AUTO: 42.3 % (ref 37–48.5)
HGB BLD-MCNC: 14 G/DL (ref 12–16)
MCH RBC QN AUTO: 29 PG (ref 27–31)
MCHC RBC AUTO-ENTMCNC: 33.1 G/DL (ref 32–36)
MCV RBC AUTO: 88 FL (ref 82–98)
PLATELET # BLD AUTO: 293 K/UL (ref 150–450)
PMV BLD AUTO: 9.3 FL (ref 9.2–12.9)
POTASSIUM SERPL-SCNC: 4.9 MMOL/L (ref 3.5–5.1)
PROT SERPL-MCNC: 7.8 G/DL (ref 6–8.4)
RBC # BLD AUTO: 4.82 M/UL (ref 4–5.4)
SODIUM SERPL-SCNC: 142 MMOL/L (ref 136–145)
TSH SERPL DL<=0.005 MIU/L-ACNC: 0.52 UIU/ML (ref 0.4–4)
VIT B12 SERPL-MCNC: 432 PG/ML (ref 210–950)
WBC # BLD AUTO: 6.35 K/UL (ref 3.9–12.7)

## 2022-07-25 PROCEDURE — 82306 VITAMIN D 25 HYDROXY: CPT | Performed by: PSYCHIATRY & NEUROLOGY

## 2022-07-25 PROCEDURE — 36415 COLL VENOUS BLD VENIPUNCTURE: CPT | Performed by: PSYCHIATRY & NEUROLOGY

## 2022-07-25 PROCEDURE — 82746 ASSAY OF FOLIC ACID SERUM: CPT | Performed by: PSYCHIATRY & NEUROLOGY

## 2022-07-25 PROCEDURE — 80053 COMPREHEN METABOLIC PANEL: CPT | Performed by: PSYCHIATRY & NEUROLOGY

## 2022-07-25 PROCEDURE — 99214 PR OFFICE/OUTPT VISIT, EST, LEVL IV, 30-39 MIN: ICD-10-PCS | Mod: 95,,, | Performed by: PSYCHIATRY & NEUROLOGY

## 2022-07-25 PROCEDURE — 84443 ASSAY THYROID STIM HORMONE: CPT | Performed by: PSYCHIATRY & NEUROLOGY

## 2022-07-25 PROCEDURE — 99214 OFFICE O/P EST MOD 30 MIN: CPT | Mod: 95,,, | Performed by: PSYCHIATRY & NEUROLOGY

## 2022-07-25 PROCEDURE — 85027 COMPLETE CBC AUTOMATED: CPT | Performed by: PSYCHIATRY & NEUROLOGY

## 2022-07-25 PROCEDURE — 82607 VITAMIN B-12: CPT | Performed by: PSYCHIATRY & NEUROLOGY

## 2022-07-25 RX ORDER — LORATADINE 10 MG/1
10 TABLET ORAL DAILY
COMMUNITY
End: 2023-09-06

## 2022-07-25 NOTE — PROGRESS NOTES
"Subsequent Psychiatric Session-VIRTUAL    31 y.o. female    Reason for Follow Up:   1. LEBRON (generalized anxiety disorder)        Current Medications:    Current Outpatient Medications:     loratadine (CLARITIN) 10 mg tablet, Take 10 mg by mouth once daily., Disp: , Rfl:     prenatal 25-iron-folate 6-dha 30 mg iron-1mg -200 mg Cap, Take by mouth., Disp: , Rfl:     EScitalopram oxalate (LEXAPRO) 5 MG Tab, Take 1 tablet (5 mg total) by mouth once daily., Disp: 30 tablet, Rfl: 11     Date of Last Visit:   7/11/2022    In Clinic Since:   July 2022  Therapy:    No    Interval History  Patient describes current mood as "honestly, I've been doing a lot better." She has a respiratory infection that is waking her up a lot. Denies medication side effects, including rash. Denies SI, HI, AVH.    Reviewed labs.    Manic/Hypomanic Symptom Screening:  Since the last session, have you had any periods lasting at least a few days where your energy level was higher than normal and you did not need as much sleep at night to function the following day?: No    Substance Use Screening:  Not asked this session    Review of Measures  PHQ-9: 7  LEBRON-7: 4    Scores from last session:   PHQ9 Score:              8/27  GAD7 Score:              7/21    Review of PDMP  Reviewed this session Yes, describe: No changes    Constitutional     VITAL SIGNS  Temp     BP      HR 76    RR      SpO2           Lab Visit on 07/25/2022   Component Date Value Ref Range Status    WBC 07/25/2022 6.35  3.90 - 12.70 K/uL Final    RBC 07/25/2022 4.82  4.00 - 5.40 M/uL Final    Hemoglobin 07/25/2022 14.0  12.0 - 16.0 g/dL Final    Hematocrit 07/25/2022 42.3  37.0 - 48.5 % Final    MCV 07/25/2022 88  82 - 98 fL Final    MCH 07/25/2022 29.0  27.0 - 31.0 pg Final    MCHC 07/25/2022 33.1  32.0 - 36.0 g/dL Final    RDW 07/25/2022 12.6  11.5 - 14.5 % Final    Platelets 07/25/2022 293  150 - 450 K/uL Final    MPV 07/25/2022 9.3  9.2 - 12.9 fL Final    Sodium " 07/25/2022 142  136 - 145 mmol/L Final    Potassium 07/25/2022 4.9  3.5 - 5.1 mmol/L Final    Chloride 07/25/2022 107  95 - 110 mmol/L Final    CO2 07/25/2022 22 (A) 23 - 29 mmol/L Final    Glucose 07/25/2022 104  70 - 110 mg/dL Final    BUN 07/25/2022 15  6 - 20 mg/dL Final    Creatinine 07/25/2022 0.8  0.5 - 1.4 mg/dL Final    Calcium 07/25/2022 9.9  8.7 - 10.5 mg/dL Final    Total Protein 07/25/2022 7.8  6.0 - 8.4 g/dL Final    Albumin 07/25/2022 4.3  3.5 - 5.2 g/dL Final    Total Bilirubin 07/25/2022 0.6  0.1 - 1.0 mg/dL Final    Comment: For infants and newborns, interpretation of results should be based  on gestational age, weight and in agreement with clinical  observations.    Premature Infant recommended reference ranges:  Up to 24 hours.............<8.0 mg/dL  Up to 48 hours............<12.0 mg/dL  3-5 days..................<15.0 mg/dL  6-29 days.................<15.0 mg/dL      Alkaline Phosphatase 07/25/2022 62  55 - 135 U/L Final    AST 07/25/2022 21  10 - 40 U/L Final    ALT 07/25/2022 29  10 - 44 U/L Final    Anion Gap 07/25/2022 13  8 - 16 mmol/L Final    eGFR if African American 07/25/2022 >60  >60 mL/min/1.73 m^2 Final    eGFR if non African American 07/25/2022 >60  >60 mL/min/1.73 m^2 Final    Comment: Calculation used to obtain the estimated glomerular filtration  rate (eGFR) is the CKD-EPI equation.       Folate 07/25/2022 18.0  4.0 - 24.0 ng/mL Final    TSH 07/25/2022 0.516  0.400 - 4.000 uIU/mL Final    Vitamin B-12 07/25/2022 432  210 - 950 pg/mL Final    Vit D, 25-Hydroxy 07/25/2022 47  30 - 96 ng/mL Final    Comment: Vitamin D deficiency.........<10 ng/mL                              Vitamin D insufficiency......10-29 ng/mL       Vitamin D sufficiency........> or equal to 30 ng/mL  Vitamin D toxicity............>100 ng/mL     Procedure visit on 04/25/2022   Component Date Value Ref Range Status    POC Preg Test, Ur 04/25/2022 Negative  Negative Final      "Acceptable 04/25/2022 Yes   Final    Final Pathologic Diagnosis 04/25/2022    Final                    Value:[facility] DIAGNOSIS:  A.   CERVIX, 6 O'CLOCK, BIOPSY:  - Squamous mucosa with mild dysplasia (EVETTE 1) and background chronic  cervicitis, see comment.  B.   ENDOCERVIX, CURETTAGE:  - Benign squamous mucosa and endocervical glandular mucosa.  - Negative for dysplasia.  COMMENT:  A).  *An immunohistochemical stain for p16 shows an absence of block  positivity, supporting the above diagnosis.  TRISTIN WALTON M.D.  Report attached.  Performing site:  [address] [address]  "Disclaimer:  This case diagnosis was rendered completely by the outside  consultation pathologist and the case is electronically signed by an [facility]  pathologist listed below solely to release the report into the medical  record."      Interp By Lorraine Vaughn M.D., Signed on 05/04/2022 at 09:02    Disclaimer 04/25/2022    Final                    Value:Unless the case is a 'gross only' or additional testing only, the final  diagnosis for each specimen is based on a microscopic examination of  appropriate tissue sections.          MENTAL STATUS EXAM  General:  Alert and oriented x 4 Appearance is good grooming and hygiene, appears stated age, Body mass index is 34.19 kg/m². . Behavior: friendly and cooperative, eye contact normal.  Gait, Posture and Muscle Strength/Tone: Normal posture observed while watching patient walk to exam room. No gross abnormal movements noted.  Psychomotor Agitation and Retardation: none evident  Speech: Normal rate, volume, articulation, and tone.  Mood: denies depression  Affect: full  Thought Process: Linear and coherent. No flight of ideas.  Associations:  Intact. No loosening of associations.  Thought content: Denies suicidal and homicidal thoughts, plans and intentions.  Perceptions: Denies any auditory or visual hallucinations. Does not appear internally " preoccupied.  Orientation: Alert and oriented to person, place, date and situation  Attention and Concentration: Intact.   Memory: Intact grossly   Language: No deficits noted   Fund of Knowledge: appropriate for age and level and education.   Insight:   good  Judgment: good  Impulse control: good      ASSESSMENT  Problem List Items Addressed This Visit        Psychiatric    LEBRON (generalized anxiety disorder) - Primary             PLAN  Patient Instructions   Increase lamictal from 25mg to 50mg daily.  Continue Zoloft 12.5mg    Follow up in 4 weeks.        -------------------------------------------------------------------------------    Janell Ruiz  [facility] - PSYCHIATRY    Today's encounter took a total time of 30 minutes, and that time included interview and documentation.    Due to the pandemic, today's session was performed over [facility]. Patient is confirmed to be in the [facility]. The participants are patient and myself.     Risks, Benefits, Side Effects and Alternatives were discussed with the patient today and consent was obtained for the current medication regimen. The patient was encouraged to ask questions and these questions were answered and the patient was engaged in psychoeducation regarding diagnosis, course of illness, accuracy of the diagnosis, and other general elements regarding overall diagnosis and treatment consideration.     Any medications being used off-label were discussed with the patient inclusive of the evidence base for the use of the medications and consent was obtained for the off-label use of the medication.     Brief suicide risk assessment was performed with the patient today and safety planning was performed if indicated.    Note completed by: Janell Ruiz MD, 8/12/2022 9:33 AM

## 2022-08-08 ENCOUNTER — PATIENT MESSAGE (OUTPATIENT)
Dept: PSYCHIATRY | Facility: CLINIC | Age: 32
End: 2022-08-08
Payer: OTHER GOVERNMENT

## 2022-08-10 ENCOUNTER — PATIENT MESSAGE (OUTPATIENT)
Dept: PSYCHIATRY | Facility: CLINIC | Age: 32
End: 2022-08-10
Payer: OTHER GOVERNMENT

## 2022-08-11 ENCOUNTER — PATIENT MESSAGE (OUTPATIENT)
Dept: PSYCHIATRY | Facility: CLINIC | Age: 32
End: 2022-08-11
Payer: OTHER GOVERNMENT

## 2022-08-11 ENCOUNTER — TELEPHONE (OUTPATIENT)
Dept: PSYCHIATRY | Facility: CLINIC | Age: 32
End: 2022-08-11
Payer: OTHER GOVERNMENT

## 2022-08-12 ENCOUNTER — OFFICE VISIT (OUTPATIENT)
Dept: PSYCHIATRY | Facility: CLINIC | Age: 32
End: 2022-08-12
Payer: OTHER GOVERNMENT

## 2022-08-12 VITALS — WEIGHT: 175.06 LBS | BODY MASS INDEX: 34.19 KG/M2 | HEART RATE: 92 BPM

## 2022-08-12 VITALS — HEIGHT: 60 IN | BODY MASS INDEX: 34.36 KG/M2 | HEART RATE: 74 BPM | WEIGHT: 175 LBS

## 2022-08-12 DIAGNOSIS — F41.1 GAD (GENERALIZED ANXIETY DISORDER): Primary | ICD-10-CM

## 2022-08-12 PROCEDURE — 99214 OFFICE O/P EST MOD 30 MIN: CPT | Mod: 95,,, | Performed by: PSYCHIATRY & NEUROLOGY

## 2022-08-12 PROCEDURE — 99214 PR OFFICE/OUTPT VISIT, EST, LEVL IV, 30-39 MIN: ICD-10-PCS | Mod: 95,,, | Performed by: PSYCHIATRY & NEUROLOGY

## 2022-08-12 RX ORDER — ESCITALOPRAM OXALATE 5 MG/1
5 TABLET ORAL DAILY
Qty: 30 TABLET | Refills: 11 | Status: SHIPPED | OUTPATIENT
Start: 2022-08-12 | End: 2022-08-16

## 2022-08-12 NOTE — PATIENT INSTRUCTIONS
Zoloft 12.5mg daily  Lamictal 25mg for two weeks, then increase to 50mg for two weeks.  Labs    Follow up in 4 weeks.

## 2022-08-12 NOTE — PROGRESS NOTES
"Subsequent Psychiatric Session-VIRTUAL    31 y.o. female    Reason for Follow Up:   1. LEBRON (generalized anxiety disorder)        Current Medications:    Current Outpatient Medications:     loratadine (CLARITIN) 10 mg tablet, Take 10 mg by mouth once daily., Disp: , Rfl:     prenatal 25-iron-folate 6-dha 30 mg iron-1mg -200 mg Cap, Take by mouth., Disp: , Rfl:     EScitalopram oxalate (LEXAPRO) 5 MG Tab, Take 1 tablet (5 mg total) by mouth once daily., Disp: 30 tablet, Rfl: 11     Date of Last Visit:   7/11/2022    In Clinic Since:   July 2022  Therapy:    No    Interval History  Patient has had a reaction to Lamictal, including rash on palms and back of hands, cuticles, and neck. Her dentist noticed that her tongue had changes and her lips were swelling and the skin around the lips cracking. Her tongue feels burnt. She was not sure at first if it was lamictal. She worse latex gloves and was bitten by ants which could also be causes.She was advised yesterday over messages to stop the medication. She is disappointed, because she felt better on lamictal starting the second week on 50mg. She has been taking Zoloft 25mg, not 12.5mg. She feels that the side effects are better at this dose. The side effects from that are improved but not completely gone (libido, food aversions, irritiability, dizziness.) She still has trouble with motivation to clean the house. She has no desire to "do social things." Her  has COVID. When he has recovered, she plans to try a yoga studio with a friend. Denies SI, HI, AVH.    Reviewed labs.    Manic/Hypomanic Symptom Screening:  Since the last session, have you had any periods lasting at least a few days where your energy level was higher than normal and you did not need as much sleep at night to function the following day?: No    Substance Use Screening:  Not asked this session    Review of Measures  PHQ-9: 4  LEBRON-7: 1    Scores from last session:   PHQ9 Score:              " 8/27  GAD7 Score:              7/21    Review of PDMP  Reviewed this session Yes, describe: No changes    Constitutional     VITAL SIGNS  Temp     BP      HR 92    RR      SpO2           Lab Visit on 07/25/2022   Component Date Value Ref Range Status    WBC 07/25/2022 6.35  3.90 - 12.70 K/uL Final    RBC 07/25/2022 4.82  4.00 - 5.40 M/uL Final    Hemoglobin 07/25/2022 14.0  12.0 - 16.0 g/dL Final    Hematocrit 07/25/2022 42.3  37.0 - 48.5 % Final    MCV 07/25/2022 88  82 - 98 fL Final    MCH 07/25/2022 29.0  27.0 - 31.0 pg Final    MCHC 07/25/2022 33.1  32.0 - 36.0 g/dL Final    RDW 07/25/2022 12.6  11.5 - 14.5 % Final    Platelets 07/25/2022 293  150 - 450 K/uL Final    MPV 07/25/2022 9.3  9.2 - 12.9 fL Final    Sodium 07/25/2022 142  136 - 145 mmol/L Final    Potassium 07/25/2022 4.9  3.5 - 5.1 mmol/L Final    Chloride 07/25/2022 107  95 - 110 mmol/L Final    CO2 07/25/2022 22 (A) 23 - 29 mmol/L Final    Glucose 07/25/2022 104  70 - 110 mg/dL Final    BUN 07/25/2022 15  6 - 20 mg/dL Final    Creatinine 07/25/2022 0.8  0.5 - 1.4 mg/dL Final    Calcium 07/25/2022 9.9  8.7 - 10.5 mg/dL Final    Total Protein 07/25/2022 7.8  6.0 - 8.4 g/dL Final    Albumin 07/25/2022 4.3  3.5 - 5.2 g/dL Final    Total Bilirubin 07/25/2022 0.6  0.1 - 1.0 mg/dL Final    Comment: For infants and newborns, interpretation of results should be based  on gestational age, weight and in agreement with clinical  observations.    Premature Infant recommended reference ranges:  Up to 24 hours.............<8.0 mg/dL  Up to 48 hours............<12.0 mg/dL  3-5 days..................<15.0 mg/dL  6-29 days.................<15.0 mg/dL      Alkaline Phosphatase 07/25/2022 62  55 - 135 U/L Final    AST 07/25/2022 21  10 - 40 U/L Final    ALT 07/25/2022 29  10 - 44 U/L Final    Anion Gap 07/25/2022 13  8 - 16 mmol/L Final    eGFR if African American 07/25/2022 >60  >60 mL/min/1.73 m^2 Final    eGFR if non African American  07/25/2022 >60  >60 mL/min/1.73 m^2 Final    Comment: Calculation used to obtain the estimated glomerular filtration  rate (eGFR) is the CKD-EPI equation.       Folate 07/25/2022 18.0  4.0 - 24.0 ng/mL Final    TSH 07/25/2022 0.516  0.400 - 4.000 uIU/mL Final    Vitamin B-12 07/25/2022 432  210 - 950 pg/mL Final    Vit D, 25-Hydroxy 07/25/2022 47  30 - 96 ng/mL Final    Comment: Vitamin D deficiency.........<10 ng/mL                              Vitamin D insufficiency......10-29 ng/mL       Vitamin D sufficiency........> or equal to 30 ng/mL  Vitamin D toxicity............>100 ng/mL          MENTAL STATUS EXAM  General:  Alert and oriented x 4 Appearance is good grooming and hygiene, appears stated age, Body mass index is 34.19 kg/m². . Behavior: friendly and cooperative, eye contact normal.  Gait, Posture and Muscle Strength/Tone: Normal posture observed while watching patient walk to exam room. No gross abnormal movements noted.  Psychomotor Agitation and Retardation: none evident  Speech: Normal rate, volume, articulation, and tone.  Mood: denies depression  Affect: full  Thought Process: Linear and coherent. No flight of ideas.  Associations:  Intact. No loosening of associations.  Thought content: Denies suicidal and homicidal thoughts, plans and intentions.  Perceptions: Denies any auditory or visual hallucinations. Does not appear internally preoccupied.  Orientation: Alert and oriented to person, place, date and situation  Attention and Concentration: Intact.   Memory: Intact grossly   Language: No deficits noted   Fund of Knowledge: appropriate for age and level and education.   Insight:   good  Judgment: good  Impulse control: good      ASSESSMENT  Problem List Items Addressed This Visit        Psychiatric    LEBRON (generalized anxiety disorder) - Primary    Relevant Medications    EScitalopram oxalate (LEXAPRO) 5 MG Tab              PLAN  Patient Instructions   Stop Lamictal and Zoloft  Start lexapro  5mg tomorrow  Yoga    Follow up in 3 weeks        -------------------------------------------------------------------------------    Janell Ruiz  [employer] - PSYCHIATRY    Today's encounter took a total time of 30 minutes, and that time included interview and documentation.    Due to the pandemic, today's session was performed over [facility]. Patient is confirmed to be in the [facility]. The participants are patient and myself.     Risks, Benefits, Side Effects and Alternatives were discussed with the patient today and consent was obtained for the current medication regimen. The patient was encouraged to ask questions and these questions were answered and the patient was engaged in psychoeducation regarding diagnosis, course of illness, accuracy of the diagnosis, and other general elements regarding overall diagnosis and treatment consideration.     Any medications being used off-label were discussed with the patient inclusive of the evidence base for the use of the medications and consent was obtained for the off-label use of the medication.     Brief suicide risk assessment was performed with the patient today and safety planning was performed if indicated.    Note completed by: Janell Ruiz MD, 8/12/2022 9:33 AM

## 2022-08-16 DIAGNOSIS — F41.1 GAD (GENERALIZED ANXIETY DISORDER): Primary | ICD-10-CM

## 2022-08-16 RX ORDER — BUPROPION HYDROCHLORIDE 150 MG/1
150 TABLET ORAL DAILY
Qty: 30 TABLET | Refills: 2 | Status: SHIPPED | OUTPATIENT
Start: 2022-08-16 | End: 2022-09-02

## 2022-08-31 ENCOUNTER — PATIENT MESSAGE (OUTPATIENT)
Dept: PSYCHIATRY | Facility: CLINIC | Age: 32
End: 2022-08-31
Payer: OTHER GOVERNMENT

## 2022-09-02 ENCOUNTER — OFFICE VISIT (OUTPATIENT)
Dept: PSYCHIATRY | Facility: CLINIC | Age: 32
End: 2022-09-02
Payer: OTHER GOVERNMENT

## 2022-09-02 VITALS — HEART RATE: 69 BPM | WEIGHT: 175.06 LBS | BODY MASS INDEX: 34.19 KG/M2

## 2022-09-02 DIAGNOSIS — F41.1 GAD (GENERALIZED ANXIETY DISORDER): Primary | ICD-10-CM

## 2022-09-02 PROCEDURE — 99214 PR OFFICE/OUTPT VISIT, EST, LEVL IV, 30-39 MIN: ICD-10-PCS | Mod: 95,,, | Performed by: PSYCHIATRY & NEUROLOGY

## 2022-09-02 PROCEDURE — 99214 OFFICE O/P EST MOD 30 MIN: CPT | Mod: 95,,, | Performed by: PSYCHIATRY & NEUROLOGY

## 2022-09-02 RX ORDER — BUPROPION HYDROCHLORIDE 150 MG/1
150 TABLET ORAL DAILY
Qty: 90 TABLET | Refills: 1 | Status: SHIPPED | OUTPATIENT
Start: 2022-09-02 | End: 2022-11-14

## 2022-09-02 NOTE — PROGRESS NOTES
Subsequent Psychiatric Session-VIRTUAL    32 y.o. female    Reason for Follow Up:   1. LEBRON (generalized anxiety disorder)        Current Medications:    Current Outpatient Medications:     loratadine (CLARITIN) 10 mg tablet, Take 10 mg by mouth once daily., Disp: , Rfl:     prenatal 25-iron-folate 6-dha 30 mg iron-1mg -200 mg Cap, Take by mouth., Disp: , Rfl:     buPROPion (WELLBUTRIN XL) 150 MG TB24 tablet, Take 1 tablet (150 mg total) by mouth once daily., Disp: 90 tablet, Rfl: 1     Date of Last Visit:   08/12/2022    In Clinic Since:   July 2022  Therapy:    No    Interval History  Patient feels she is doing well on Wellbutrin. There were a couple days she felt anxious. She think because she was starting her cycle and/or she had a starbucks drink instead of her usual Keurig. She is more productive at work and at home. She and her  have the same exact birthday. She had a full birthday weekend, helping a friend try on wedding dresses. The irritability is better, but not gone. The rash took 4 days to resolve. She is having incredibly vivid dreams. She denies other side effects. Denies SI, HI, AVH.    Manic/Hypomanic Symptom Screening:  Since the last session, have you had any periods lasting at least a few days where your energy level was higher than normal and you did not need as much sleep at night to function the following day?: No    Substance Use Screening:  Not asked this session    Review of Measures  PHQ-9: 1  LEBRON-7: 3    Scores from last session:   PHQ-9: 4  LEBRON-7: 1    PHQ9 Score:              8/27  GAD7 Score:              7/21    Review of PDMP  Reviewed this session Yes, describe: No changes    Constitutional     VITAL SIGNS  Temp     BP      HR 69    RR      SpO2           MENTAL STATUS EXAM  General:  Alert and oriented x 4 Appearance is good grooming and hygiene, appears stated age, Body mass index is 34.19 kg/m². . Behavior: friendly and cooperative, eye contact normal.  Gait, Posture and  Muscle Strength/Tone: Normal posture observed while watching patient walk to exam room. No gross abnormal movements noted.  Psychomotor Agitation and Retardation: none evident  Speech: Normal rate, volume, articulation, and tone.  Mood: denies depression  Affect: full  Thought Process: Linear and coherent. No flight of ideas.  Associations:  Intact. No loosening of associations.  Thought content: Denies suicidal and homicidal thoughts, plans and intentions.  Perceptions: Denies any auditory or visual hallucinations. Does not appear internally preoccupied.  Orientation: Alert and oriented to person, place, date and situation  Attention and Concentration: Intact.   Memory: Intact grossly   Language: No deficits noted   Fund of Knowledge: appropriate for age and level and education.   Insight:   good  Judgment: good  Impulse control: good      ASSESSMENT  Problem List Items Addressed This Visit          Psychiatric    LEBRON (generalized anxiety disorder) - Primary    Relevant Medications    buPROPion (WELLBUTRIN XL) 150 MG TB24 tablet           PLAN  Patient Instructions   Continue wellbutrin XL 150mg daily    Follow up in 4 weeks.      -------------------------------------------------------------------------------    Janell Ruiz  [employer] - PSYCHIATRY    Today's encounter took a total time of 30 minutes, and that time included interview and documentation.    Due to the pandemic, today's session was performed over [facility]. Patient is confirmed to be in the [facility]. The participants are patient and myself.     Risks, Benefits, Side Effects and Alternatives were discussed with the patient today and consent was obtained for the current medication regimen. The patient was encouraged to ask questions and these questions were answered and the patient was engaged in psychoeducation regarding diagnosis, course of illness, accuracy of the diagnosis, and other general elements regarding overall diagnosis and treatment  consideration.     Any medications being used off-label were discussed with the patient inclusive of the evidence base for the use of the medications and consent was obtained for the off-label use of the medication.     Brief suicide risk assessment was performed with the patient today and safety planning was performed if indicated.    Note completed by: Janell Ruiz MD, 9/4/2022 9:33 AM

## 2022-11-08 ENCOUNTER — PATIENT MESSAGE (OUTPATIENT)
Dept: PSYCHIATRY | Facility: CLINIC | Age: 32
End: 2022-11-08
Payer: OTHER GOVERNMENT

## 2022-11-14 ENCOUNTER — PATIENT MESSAGE (OUTPATIENT)
Dept: PSYCHIATRY | Facility: CLINIC | Age: 32
End: 2022-11-14
Payer: OTHER GOVERNMENT

## 2022-11-14 DIAGNOSIS — F41.1 GAD (GENERALIZED ANXIETY DISORDER): ICD-10-CM

## 2022-11-14 RX ORDER — BUPROPION HYDROCHLORIDE 300 MG/1
300 TABLET ORAL DAILY
Qty: 90 TABLET | Refills: 1 | Status: SHIPPED | OUTPATIENT
Start: 2022-11-14 | End: 2022-12-05

## 2022-11-14 RX ORDER — BUPROPION HYDROCHLORIDE 300 MG/1
300 TABLET ORAL DAILY
Qty: 90 TABLET | Refills: 1 | Status: SHIPPED | OUTPATIENT
Start: 2022-11-14 | End: 2022-11-14 | Stop reason: SDUPTHER

## 2022-12-05 ENCOUNTER — PATIENT MESSAGE (OUTPATIENT)
Dept: PSYCHIATRY | Facility: CLINIC | Age: 32
End: 2022-12-05
Payer: OTHER GOVERNMENT

## 2022-12-05 DIAGNOSIS — F41.1 GAD (GENERALIZED ANXIETY DISORDER): ICD-10-CM

## 2022-12-05 RX ORDER — BUPROPION HYDROCHLORIDE 150 MG/1
300 TABLET ORAL DAILY
Qty: 30 TABLET | Refills: 0 | Status: SHIPPED | OUTPATIENT
Start: 2022-12-05 | End: 2023-04-10

## 2022-12-29 NOTE — PROGRESS NOTES
"Subsequent Psychiatric Session-VIRTUAL    32 y.o. female    Reason for Follow Up:   1. LEBRON (generalized anxiety disorder)          Current Medications:    Current Outpatient Medications:     buPROPion (WELLBUTRIN XL) 150 MG TB24 tablet, Take 2 tablets (300 mg total) by mouth once daily., Disp: 30 tablet, Rfl: 0    loratadine (CLARITIN) 10 mg tablet, Take 10 mg by mouth once daily., Disp: , Rfl:     prenatal 25-iron-folate 6-dha 30 mg iron-1mg -200 mg Cap, Take by mouth., Disp: , Rfl:     buPROPion (WELLBUTRIN) 75 MG tablet, Take 1 tablet (75 mg total) by mouth once daily., Disp: 30 tablet, Rfl: 2     Date of Last Visit:   09/02/2022    In Clinic Since:   July 2022  Therapy:    No    Interval History  When she increased Wellbutrin XL to 300mg, she had three days in a row of bad anxiety with jitteriness and chest tightness. It has not happened since she decreased to 150mg. She had more energy, was more proactive, got more done around the hosue. She had pneumonia in the second week of December. Calcium supplements "tore my stomach up." She has some irritability the week before periods. She is starting IVF soon and hopes to be pregnant in March. She denies other side effects. Denies SI, HI, AVH.    Manic/Hypomanic Symptom Screening:  Since the last session, have you had any periods lasting at least a few days where your energy level was higher than normal and you did not need as much sleep at night to function the following day?: No    Substance Use Screening:  Not asked this session    Review of Measures  PHQ-9: 6  LEBRON-7: 4    Scores from last session:   PHQ-9: 1  LEBRON-7: 3    Scores from initial session:  PHQ9 Score:              8/27  GAD7 Score:              7/21    Review of PDMP  Reviewed this session Yes, describe: No changes    Constitutional     VITAL SIGNS  Temp     BP      HR 71    RR      SpO2           MENTAL STATUS EXAM  General:  Alert and oriented x 4 Appearance is good grooming and hygiene, appears " stated age, Body mass index is 34.18 kg/m². . Behavior: friendly and cooperative, eye contact normal.  Gait, Posture and Muscle Strength/Tone: Normal posture observed. No gross abnormal movements noted.  Psychomotor Agitation and Retardation: none evident  Speech: Normal rate, volume, articulation, and tone.  Mood: denies depression  Affect: full  Thought Process: Linear and coherent. No flight of ideas.  Associations:  Intact. No loosening of associations.  Thought content: Denies suicidal and homicidal thoughts, plans and intentions.  Perceptions: Denies any auditory or visual hallucinations. Does not appear internally preoccupied.  Orientation: Alert and oriented to person, place, date and situation  Attention and Concentration: Intact.   Memory: Intact grossly   Language: No deficits noted   Fund of Knowledge: appropriate for age and level and education.   Insight:   good  Judgment: good  Impulse control: good      ASSESSMENT  Problem List Items Addressed This Visit          Psychiatric    LEBRON (generalized anxiety disorder) - Primary    Relevant Medications    buPROPion (WELLBUTRIN) 75 MG tablet             PLAN  Patient Instructions   Add Wellbutrin SR 75mg to XL 150mg daily.    Follow up in late February or early March      -------------------------------------------------------------------------------    Janell Ruiz  [employer] - PSYCHIATRY    Today's encounter took a total time of 30 minutes, and that time included interview and documentation.    Due to the pandemic, today's session was performed over video. Patient is confirmed to be in the [facility]. The participants are patient and myself.     Risks, Benefits, Side Effects and Alternatives were discussed with the patient today and consent was obtained for the current medication regimen. The patient was encouraged to ask questions and these questions were answered and the patient was engaged in psychoeducation regarding diagnosis, course of  illness, accuracy of the diagnosis, and other general elements regarding overall diagnosis and treatment consideration.     Any medications being used off-label were discussed with the patient inclusive of the evidence base for the use of the medications and consent was obtained for the off-label use of the medication.     Brief suicide risk assessment was performed with the patient today and safety planning was performed if indicated.    Note completed by: Janell Ruiz MD, 1/4/2023 9:33 AM

## 2023-01-03 ENCOUNTER — PATIENT MESSAGE (OUTPATIENT)
Dept: PSYCHIATRY | Facility: CLINIC | Age: 33
End: 2023-01-03
Payer: OTHER GOVERNMENT

## 2023-01-04 ENCOUNTER — OFFICE VISIT (OUTPATIENT)
Dept: PSYCHIATRY | Facility: CLINIC | Age: 33
End: 2023-01-04
Payer: OTHER GOVERNMENT

## 2023-01-04 VITALS — BODY MASS INDEX: 34.18 KG/M2 | WEIGHT: 175 LBS | HEART RATE: 71 BPM

## 2023-01-04 DIAGNOSIS — F41.1 GAD (GENERALIZED ANXIETY DISORDER): Primary | ICD-10-CM

## 2023-01-04 PROCEDURE — 99214 PR OFFICE/OUTPT VISIT, EST, LEVL IV, 30-39 MIN: ICD-10-PCS | Mod: 95,,, | Performed by: PSYCHIATRY & NEUROLOGY

## 2023-01-04 PROCEDURE — 99214 OFFICE O/P EST MOD 30 MIN: CPT | Mod: 95,,, | Performed by: PSYCHIATRY & NEUROLOGY

## 2023-01-04 RX ORDER — BUPROPION HYDROCHLORIDE 75 MG/1
75 TABLET ORAL DAILY
Qty: 30 TABLET | Refills: 2 | Status: SHIPPED | OUTPATIENT
Start: 2023-01-04 | End: 2023-05-30

## 2023-01-31 ENCOUNTER — PATIENT MESSAGE (OUTPATIENT)
Dept: PSYCHIATRY | Facility: CLINIC | Age: 33
End: 2023-01-31
Payer: OTHER GOVERNMENT

## 2023-02-27 NOTE — PROGRESS NOTES
Subsequent Psychiatric Session-VIRTUAL    32 y.o. female    Reason for Follow Up:   1. LEBRON (generalized anxiety disorder)          Current Medications:    Current Outpatient Medications:     buPROPion (WELLBUTRIN XL) 150 MG TB24 tablet, Take 2 tablets (300 mg total) by mouth once daily., Disp: 30 tablet, Rfl: 0    buPROPion (WELLBUTRIN) 75 MG tablet, Take 1 tablet (75 mg total) by mouth once daily., Disp: 30 tablet, Rfl: 2    loratadine (CLARITIN) 10 mg tablet, Take 10 mg by mouth once daily., Disp: , Rfl:     prenatal 25-iron-folate 6-dha 30 mg iron-1mg -200 mg Cap, Take by mouth., Disp: , Rfl:      Date of Last Visit:   01/04/23    In Clinic Since:   July 2022  Therapy:    No    Interval History  Magnesium also caused stomach issues. She undergoes egg retrieval next week. She is hoping to make the transfer in May. She has been on five or six different hormones. She has been feeling more emotional. She has to pee several times per night. She has vivid dreams. She has been taking the XL 150mg in the morning and 75mg short-acting some days at noon. This lasts her until she gets home. Denies SI, HI, AVH.    Manic/Hypomanic Symptom Screening:  Since the last session, have you had any periods lasting at least a few days where your energy level was higher than normal and you did not need as much sleep at night to function the following day?: No    Substance Use Screening:  Not asked this session    Review of Measures  PHQ-9: 2  LEBRON-7: 3    Scores from last session:   PHQ-9: 6  LEBRON-7: 4    PHQ-9: 1  LEBRON-7: 3    Scores from initial session:  PHQ9 Score:              8/27  GAD7 Score:              7/21    Review of PDMP  Reviewed this session Yes, describe: No changes    Constitutional     VITAL SIGNS  Temp     BP      HR (!) 53    RR      SpO2           MENTAL STATUS EXAM  General:  Alert and oriented x 4 Appearance is good grooming and hygiene, appears stated age, Body mass index is 34.18 kg/m².  Behavior: friendly and  cooperative, eye contact normal.  Gait, Posture and Muscle Strength/Tone: Normal posture observed. No gross abnormal movements noted.  Psychomotor Agitation and Retardation: none evident  Speech: Normal rate, volume, articulation, and tone.  Mood: denies depression  Affect: full  Thought Process: Linear and coherent. No flight of ideas.  Associations:  Intact. No loosening of associations.  Thought content: Denies suicidal and homicidal thoughts, plans and intentions.  Perceptions: Denies any auditory or visual hallucinations. Does not appear internally preoccupied.  Orientation: Alert and oriented to person, place, date and situation  Attention and Concentration: Intact.   Memory: Intact grossly   Language: No deficits noted   Fund of Knowledge: appropriate for age and level and education.   Insight:   good  Judgment: good  Impulse control: good      ASSESSMENT  Problem List Items Addressed This Visit          Psychiatric    LEBRON (generalized anxiety disorder) - Primary               PLAN  Patient Instructions   Continue Wellbutrin XL 150mg in the morning and SR 75mg.    Follow up in late May.      -------------------------------------------------------------------------------    Janell Ruiz  Mormon - PSYCHIATRY    Today's encounter took a total time of 30 minutes, and that time included interview and documentation.    Due to the pandemic, today's session was performed over video. Patient is [facility] to [facility] in [facility]. The participants are patient and myself.     Risks, Benefits, Side Effects and Alternatives were discussed with the patient today and consent was obtained for the current medication regimen. The patient was encouraged to ask questions and these questions were answered and the patient was engaged in psychoeducation regarding diagnosis, course of illness, accuracy of the diagnosis, and other general elements regarding overall diagnosis and treatment consideration.     Any medications  being used off-label were discussed with the patient inclusive of the evidence base for the use of the medications and consent was obtained for the off-label use of the medication.     Brief suicide risk assessment was performed with the patient today and safety planning was performed if indicated.    Note completed by: Janell Ruiz MD, 3/7/2023 9:33 AM

## 2023-03-01 ENCOUNTER — PATIENT MESSAGE (OUTPATIENT)
Dept: PSYCHIATRY | Facility: CLINIC | Age: 33
End: 2023-03-01

## 2023-03-01 ENCOUNTER — OFFICE VISIT (OUTPATIENT)
Dept: PSYCHIATRY | Facility: CLINIC | Age: 33
End: 2023-03-01
Payer: OTHER GOVERNMENT

## 2023-03-01 VITALS — BODY MASS INDEX: 34.18 KG/M2 | HEART RATE: 53 BPM | WEIGHT: 175 LBS

## 2023-03-01 DIAGNOSIS — F41.1 GAD (GENERALIZED ANXIETY DISORDER): Primary | ICD-10-CM

## 2023-03-01 PROCEDURE — 99214 OFFICE O/P EST MOD 30 MIN: CPT | Mod: 95,,, | Performed by: PSYCHIATRY & NEUROLOGY

## 2023-03-01 PROCEDURE — 99214 PR OFFICE/OUTPT VISIT, EST, LEVL IV, 30-39 MIN: ICD-10-PCS | Mod: 95,,, | Performed by: PSYCHIATRY & NEUROLOGY

## 2023-04-06 ENCOUNTER — OFFICE VISIT (OUTPATIENT)
Dept: OBSTETRICS AND GYNECOLOGY | Facility: CLINIC | Age: 33
End: 2023-04-06
Payer: OTHER GOVERNMENT

## 2023-04-06 VITALS
HEIGHT: 60 IN | WEIGHT: 169.06 LBS | DIASTOLIC BLOOD PRESSURE: 86 MMHG | BODY MASS INDEX: 33.19 KG/M2 | SYSTOLIC BLOOD PRESSURE: 124 MMHG

## 2023-04-06 DIAGNOSIS — Z01.419 ENCOUNTER FOR ANNUAL ROUTINE GYNECOLOGICAL EXAMINATION: Primary | ICD-10-CM

## 2023-04-06 PROCEDURE — 88175 CYTOPATH C/V AUTO FLUID REDO: CPT | Performed by: STUDENT IN AN ORGANIZED HEALTH CARE EDUCATION/TRAINING PROGRAM

## 2023-04-06 PROCEDURE — 99395 PR PREVENTIVE VISIT,EST,18-39: ICD-10-PCS | Mod: S$PBB,,, | Performed by: OBSTETRICS & GYNECOLOGY

## 2023-04-06 PROCEDURE — 88141 CYTOPATH C/V INTERPRET: CPT | Mod: ,,, | Performed by: STUDENT IN AN ORGANIZED HEALTH CARE EDUCATION/TRAINING PROGRAM

## 2023-04-06 PROCEDURE — 88141 PR  CYTOPATH CERV/VAG INTERPRET: ICD-10-PCS | Mod: ,,, | Performed by: STUDENT IN AN ORGANIZED HEALTH CARE EDUCATION/TRAINING PROGRAM

## 2023-04-06 PROCEDURE — 99999 PR PBB SHADOW E&M-EST. PATIENT-LVL III: CPT | Mod: PBBFAC,,, | Performed by: OBSTETRICS & GYNECOLOGY

## 2023-04-06 PROCEDURE — 99213 OFFICE O/P EST LOW 20 MIN: CPT | Mod: PBBFAC | Performed by: OBSTETRICS & GYNECOLOGY

## 2023-04-06 PROCEDURE — 87624 HPV HI-RISK TYP POOLED RSLT: CPT | Performed by: OBSTETRICS & GYNECOLOGY

## 2023-04-06 PROCEDURE — 99999 PR PBB SHADOW E&M-EST. PATIENT-LVL III: ICD-10-PCS | Mod: PBBFAC,,, | Performed by: OBSTETRICS & GYNECOLOGY

## 2023-04-06 PROCEDURE — 99395 PREV VISIT EST AGE 18-39: CPT | Mod: S$PBB,,, | Performed by: OBSTETRICS & GYNECOLOGY

## 2023-04-06 RX ORDER — NAPROXEN SODIUM 220 MG/1
TABLET, FILM COATED ORAL
Status: ON HOLD | COMMUNITY
Start: 2023-01-16 | End: 2024-02-05

## 2023-04-06 RX ORDER — PROGESTERONE 50 MG/ML
INJECTION, SOLUTION INTRAMUSCULAR
COMMUNITY
Start: 2023-03-31 | End: 2023-09-06

## 2023-04-06 RX ORDER — CETRORELIX ACETATE 0.25 MG
KIT SUBCUTANEOUS
COMMUNITY
Start: 2023-01-09 | End: 2023-09-06

## 2023-04-06 RX ORDER — ESTRADIOL 0.1 MG/D
FILM, EXTENDED RELEASE TRANSDERMAL
COMMUNITY
Start: 2023-03-31 | End: 2023-09-06

## 2023-04-06 RX ORDER — LETROZOLE 2.5 MG/1
TABLET, FILM COATED ORAL
COMMUNITY
Start: 2023-01-09 | End: 2023-09-06

## 2023-04-06 RX ORDER — GONADOTROPHIN, CHORIONIC 5000 UNIT
KIT INTRAMUSCULAR
COMMUNITY
Start: 2023-01-09 | End: 2023-09-06

## 2023-04-06 RX ORDER — ESTRADIOL 2 MG/1
TABLET ORAL
COMMUNITY
Start: 2023-03-31 | End: 2023-09-06

## 2023-04-06 NOTE — PROGRESS NOTES
SUBJECTIVE:     Chief Complaint: Gynecologic Exam       History of Present Illness:  Annual Exam  Patient presents for annual exam.   She c/o nothing today. Doing IVF with Dr. Koch. Have 1 embryo, starting meds , transfer after that.   LMP: 3/25/23  She denies any vd, vb, dyspareunia, dysuria, depression, anxiety.  Last pap was in  and was abnl.   Birth Control: none  declines STD testing.     GYN screening history: EVETTE 1 colpo last year  Mammogram history: na  Colonoscopy history: na  Dexa history: na     FH:   Breast cancer: none  Colon cancer: none  Ovarian cancer: none    Review of patient's allergies indicates:   Allergen Reactions    Lamictal [lamotrigine] Rash    Codeine Nausea And Vomiting     Can take other narcotics with nausea meds    Latex, natural rubber Rash       Past Medical History:   Diagnosis Date    Infertility, female     PONV (postoperative nausea and vomiting)      Past Surgical History:   Procedure Laterality Date    DILATION AND CURETTAGE OF UTERUS USING SUCTION N/A 05/10/2021    Procedure: DILATION AND CURETTAGE, UTERUS, USING SUCTION;  Surgeon: Julie R. Jeansonne, MD;  Location: [facility];  Service: OB/GYN;  Laterality: N/A;    EYE SURGERY  2018    IVF Egg Retrieval  2023     OB History          2    Para   0    Term                AB   2    Living   0         SAB   2    IAB        Ectopic        Multiple        Live Births   0           Obstetric Comments   Gynhx: 10/reg.  OCP -   Denies std,   Denies abnl, Pap 2016 NEG  S/p gardasil             Family History   Problem Relation Age of Onset    Diabetes Other      Social History     Tobacco Use    Smoking status: Never    Smokeless tobacco: Never   Substance Use Topics    Alcohol use: No    Drug use: No       Current Outpatient Medications   Medication Sig    aspirin 81 MG Chew     ergocalciferol, vitamin D2, (VITAMIN D ORAL)     ubidecarenone (COQ-10 ORAL)     buPROPion (WELLBUTRIN XL) 150 MG TB24 tablet  Take 2 tablets (300 mg total) by mouth once daily.    buPROPion (WELLBUTRIN) 75 MG tablet Take 1 tablet (75 mg total) by mouth once daily.    CETROTIDE 0.25 mg Kit SMARTSI Each SUB-Q Daily    estradioL (ESTRACE) 2 MG tablet Take by mouth.    estradioL (VIVELLE-DOT) 0.1 mg/24 hr PTSW Place onto the skin.    letrozole (FEMARA) 2.5 mg Tab Take by mouth.    loratadine (CLARITIN) 10 mg tablet Take 10 mg by mouth once daily.    NOVAREL 5,000 unit SolR Inject into the muscle.    prenatal 25-iron-folate 6-dha 30 mg iron-1mg -200 mg Cap Take by mouth.    progesterone (PROGESTERONE IN OIL) 50 mg/mL injection Inject into the muscle.     No current facility-administered medications for this visit.       Review of Systems:  GENERAL: No fever, chills, fatigability or weight loss.  CARDIOVASCULAR: No chest pain. No palpitations.  RESPIRATORY: No SOB, no wheezing.  BREAST: Denies pain. No lumps. No discharge.  VULVAR: No pain, no lesions and no itching.  VAGINAL: No relaxation, no itching, no discharge, no abnormal bleeding and no lesions.  ABDOMEN: No abdominal pain. Denies nausea. Denies vomiting. No diarrhea. No constipation  URINARY: No incontinence, no nocturia, no frequency and no dysuria.  NEUROLOGICAL: No headaches. No vision changes.       OBJECTIVE:     Vitals:    23 0949   BP: 124/86       Patient verbally consents to pelvic and breast exams.  Physical Exam:  Gen: NAD, well developed, well-nourished  HEENT: Normocephalic, atraumatic  Eyes: EOM nl, conjuntivae normal  Neck: ROM normal, no thyromegaly  Respiratory: Effort normal   Abd: soft, nontender, no masses palpated  Breast: Normal bilaterally, no masses, lesions or tenderness. No nipple discharge on expression, no lymphadenopathy bilaterally.  SSE:  Vulva: no lesions or rashes  Cervix: No lesions noted, nonfriable, no vaginal discharge or vaginal bleeding noted  BME:   Cervix: No CMT  Adnexa: nl bilaterally, no masses or fullness palpated  Uterus: normal,  nonenlarged  Musculoskeletal: normal ROM  Neuro: alert, AAOx3  Skin: warm and dry  Psych: mood/affect nl, behavior normal, judgement normal, thought content normal        ASSESSMENT:       ICD-10-CM ICD-9-CM    1. Encounter for annual routine gynecological examination  Z01.419 V72.31 Liquid-Based Pap Smear, Screening      HPV High Risk Genotypes, PCR             Plan:      Isabelle was seen today for gynecologic exam.    Diagnoses and all orders for this visit:    Encounter for annual routine gynecological examination  -     Liquid-Based Pap Smear, Screening  -     HPV High Risk Genotypes, PCR        Orders Placed This Encounter   Procedures    HPV High Risk Genotypes, PCR       Follow up in one year for annual, or prn.    Julie R Jeansonne

## 2023-04-13 LAB
HPV HR 12 DNA SPEC QL NAA+PROBE: NEGATIVE
HPV16 AG SPEC QL: NEGATIVE
HPV18 DNA SPEC QL NAA+PROBE: NEGATIVE

## 2023-04-19 ENCOUNTER — PATIENT MESSAGE (OUTPATIENT)
Dept: OBSTETRICS AND GYNECOLOGY | Facility: CLINIC | Age: 33
End: 2023-04-19
Payer: OTHER GOVERNMENT

## 2023-04-19 ENCOUNTER — PATIENT MESSAGE (OUTPATIENT)
Dept: PSYCHIATRY | Facility: CLINIC | Age: 33
End: 2023-04-19
Payer: OTHER GOVERNMENT

## 2023-04-22 LAB
FINAL PATHOLOGIC DIAGNOSIS: NORMAL
Lab: NORMAL

## 2023-05-07 ENCOUNTER — PATIENT MESSAGE (OUTPATIENT)
Dept: OBSTETRICS AND GYNECOLOGY | Facility: CLINIC | Age: 33
End: 2023-05-07
Payer: OTHER GOVERNMENT

## 2023-05-30 ENCOUNTER — PATIENT MESSAGE (OUTPATIENT)
Dept: OBSTETRICS AND GYNECOLOGY | Facility: CLINIC | Age: 33
End: 2023-05-30
Payer: OTHER GOVERNMENT

## 2023-05-30 ENCOUNTER — OFFICE VISIT (OUTPATIENT)
Dept: PSYCHIATRY | Facility: CLINIC | Age: 33
End: 2023-05-30
Payer: OTHER GOVERNMENT

## 2023-05-30 VITALS — WEIGHT: 169.06 LBS | HEART RATE: 78 BPM | BODY MASS INDEX: 33.02 KG/M2

## 2023-05-30 DIAGNOSIS — F41.1 GAD (GENERALIZED ANXIETY DISORDER): Primary | ICD-10-CM

## 2023-05-30 PROCEDURE — 99214 PR OFFICE/OUTPT VISIT, EST, LEVL IV, 30-39 MIN: ICD-10-PCS | Mod: 95,,, | Performed by: PSYCHIATRY & NEUROLOGY

## 2023-05-30 PROCEDURE — 99214 OFFICE O/P EST MOD 30 MIN: CPT | Mod: 95,,, | Performed by: PSYCHIATRY & NEUROLOGY

## 2023-05-30 NOTE — PROGRESS NOTES
"Subsequent Psychiatric Session-VIRTUAL    32 y.o. female    Reason for Follow Up:   1. LEBRON (generalized anxiety disorder)        Current Medications:    Current Outpatient Medications:     aspirin 81 MG Chew, , Disp: , Rfl:     CETROTIDE 0.25 mg Kit, SMARTSI Each SUB-Q Daily, Disp: , Rfl:     ergocalciferol, vitamin D2, (VITAMIN D ORAL), , Disp: , Rfl:     estradioL (ESTRACE) 2 MG tablet, Take by mouth., Disp: , Rfl:     estradioL (VIVELLE-DOT) 0.1 mg/24 hr PTSW, Place onto the skin., Disp: , Rfl:     letrozole (FEMARA) 2.5 mg Tab, Take by mouth., Disp: , Rfl:     loratadine (CLARITIN) 10 mg tablet, Take 10 mg by mouth once daily., Disp: , Rfl:     NOVAREL 5,000 unit SolR, Inject into the muscle., Disp: , Rfl:     prenatal 25-iron-folate 6-dha 30 mg iron-1mg -200 mg Cap, Take by mouth., Disp: , Rfl:     progesterone (PROGESTERONE IN OIL) 50 mg/mL injection, Inject into the muscle., Disp: , Rfl:     ubidecarenone (COQ-10 ORAL), , Disp: , Rfl:      Date of Last Visit:   23    In Clinic Since:   2022  Therapy:    No    Interval History  Patient learned she was pregnant this weekend. She is pregnant with a girl. She has been fatigued. She has not had nausea yet. She takes the Wellbutrin XL 150mg consistently in the morning. She has overall been in "a much better mood." She has been a lot less anxious. She has been sleeping better. He  leaves next week for a three week deployment. Denies SI, HI, AVH.     Ramiro, currently pregnant, employed at law firm as a ,  is in the     Med trials: Lexapro, Lamictal, Zoloft    Manic/Hypomanic Symptom Screening:  Since the last session, have you had any periods lasting at least a few days where your energy level was higher than normal and you did not need as much sleep at night to function the following day?: No    Substance Use Screening:  Denies    Review of Measures  PHQ-9: 2  LEBRON-7: 3    Scores from last session:   PHQ-9: " 6  LEBRON-7: 4    PHQ-9: 1  LEBRON-7: 3    Scores from initial session:  PHQ9 Score:              8/27  GAD7 Score:              7/21    Review of PDMP  Reviewed this session Yes, describe: No changes    Constitutional     VITAL SIGNS  Temp     BP      HR 78    RR      SpO2           MENTAL STATUS EXAM  General:  Alert and oriented x 4 Appearance is good grooming and hygiene, appears stated age, pregnant, Body mass index is 33.02 kg/m².  Behavior: friendly and cooperative, eye contact normal.  Gait, Posture and Muscle Strength/Tone: Normal posture observed. No gross abnormal movements noted.  Psychomotor Agitation and Retardation: none evident  Speech: Normal rate, volume, articulation, and tone.  Mood: denies depression  Affect: full  Thought Process: Linear and coherent. No flight of ideas.  Associations:  Intact. No loosening of associations.  Thought content: Denies suicidal and homicidal thoughts, plans and intentions.  Perceptions: Denies any auditory or visual hallucinations. Does not appear internally preoccupied.  Orientation: Alert and oriented to person, place, date and situation  Attention and Concentration: Intact.   Memory: Intact grossly   Language: No deficits noted   Fund of Knowledge: appropriate for age and level and education.   Insight:   good  Judgment: good  Impulse control: good      ASSESSMENT  Problem List Items Addressed This Visit          Psychiatric    LEBRON (generalized anxiety disorder) - Primary       PLAN  Patient Instructions   Pause Wellbutrin XL 150mg daily during first trimester. If symptoms return, discuss pros and risks of restarting with Dr. Yodit Gomes.      -------------------------------------------------------------------------------    Janell Ruiz  Roman Catholic - PSYCHIATRY    Today's encounter took a total time of 30 minutes, and that time included interview and documentation.    Due to the pandemic, today's session was performed over video. Patient is confirmed to be in the  [facility]. The participants are patient and myself.     Risks, Benefits, Side Effects and Alternatives were discussed with the patient today and consent was obtained for the current medication regimen. The patient was encouraged to ask questions and these questions were answered and the patient was engaged in psychoeducation regarding diagnosis, course of illness, accuracy of the diagnosis, and other general elements regarding overall diagnosis and treatment consideration.     Any medications being used off-label were discussed with the patient inclusive of the evidence base for the use of the medications and consent was obtained for the off-label use of the medication.     Brief suicide risk assessment was performed with the patient today and safety planning was performed if indicated.    Note completed by: Janell Ruiz MD, 5/30/2023 9:33 AM

## 2023-05-30 NOTE — PATIENT INSTRUCTIONS
Pause Wellbutrin XL 150mg daily during first trimester. If symptoms return, discuss pros and risks of restarting with Dr. Yodit Gomes.

## 2023-05-30 NOTE — Clinical Note
Newly pregnant through IVF. Pausing wellbutrin during first trimester. She and  Ramiro have been  2.5 years. They have had two miscarriages. IVF is the next step.

## 2023-06-06 ENCOUNTER — TELEPHONE (OUTPATIENT)
Dept: ADMINISTRATIVE | Facility: OTHER | Age: 33
End: 2023-06-06
Payer: OTHER GOVERNMENT

## 2023-06-13 ENCOUNTER — PATIENT MESSAGE (OUTPATIENT)
Dept: PSYCHIATRY | Facility: CLINIC | Age: 33
End: 2023-06-13
Payer: OTHER GOVERNMENT

## 2023-06-13 ENCOUNTER — PATIENT MESSAGE (OUTPATIENT)
Dept: OBSTETRICS AND GYNECOLOGY | Facility: CLINIC | Age: 33
End: 2023-06-13
Payer: OTHER GOVERNMENT

## 2023-06-15 ENCOUNTER — OFFICE VISIT (OUTPATIENT)
Dept: PSYCHIATRY | Facility: CLINIC | Age: 33
End: 2023-06-15
Payer: OTHER GOVERNMENT

## 2023-06-15 DIAGNOSIS — F41.1 GAD (GENERALIZED ANXIETY DISORDER): Primary | ICD-10-CM

## 2023-06-15 PROCEDURE — 99214 OFFICE O/P EST MOD 30 MIN: CPT | Mod: 95,,, | Performed by: PSYCHIATRY & NEUROLOGY

## 2023-06-15 PROCEDURE — 99214 PR OFFICE/OUTPT VISIT, EST, LEVL IV, 30-39 MIN: ICD-10-PCS | Mod: 95,,, | Performed by: PSYCHIATRY & NEUROLOGY

## 2023-06-15 RX ORDER — BUPROPION HYDROCHLORIDE 150 MG/1
150 TABLET ORAL DAILY
Qty: 90 TABLET | Refills: 4 | Status: SHIPPED | OUTPATIENT
Start: 2023-06-15 | End: 2024-02-23 | Stop reason: SDUPTHER

## 2023-06-15 NOTE — PROGRESS NOTES
"Subsequent Psychiatric Session-VIRTUAL    32 y.o. female    Reason for Follow Up:   1. LEBRON (generalized anxiety disorder)      Current Medications:    Current Outpatient Medications:     aspirin 81 MG Chew, , Disp: , Rfl:     buPROPion (WELLBUTRIN XL) 150 MG TB24 tablet, Take 1 tablet (150 mg total) by mouth once daily., Disp: 90 tablet, Rfl: 4    CETROTIDE 0.25 mg Kit, SMARTSI Each SUB-Q Daily, Disp: , Rfl:     ergocalciferol, vitamin D2, (VITAMIN D ORAL), , Disp: , Rfl:     estradioL (ESTRACE) 2 MG tablet, Take by mouth., Disp: , Rfl:     estradioL (VIVELLE-DOT) 0.1 mg/24 hr PTSW, Place onto the skin., Disp: , Rfl:     letrozole (FEMARA) 2.5 mg Tab, Take by mouth., Disp: , Rfl:     loratadine (CLARITIN) 10 mg tablet, Take 10 mg by mouth once daily., Disp: , Rfl:     NOVAREL 5,000 unit SolR, Inject into the muscle., Disp: , Rfl:     prenatal 25-iron-folate 6-dha 30 mg iron-1mg -200 mg Cap, Take by mouth., Disp: , Rfl:     progesterone (PROGESTERONE IN OIL) 50 mg/mL injection, Inject into the muscle., Disp: , Rfl:     ubidecarenone (COQ-10 ORAL), , Disp: , Rfl:      Date of Last Visit:   23    In Clinic Since:   2022  Therapy:    No    Interval History  Patient stopped Wellbutrin two weeks ago. She was alright for a week, then she has had two panic attacks. She feels jittery and overheated. Her heart rate has been higher. She has felt a lot of chest pressure. She has a little irritability, "but it's not the worst it has ever been." She is at six weeks gestation. She had her first bout of nausea yesterday. Sleep is good. Denies SI, HI, AVH.     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), employed at law firm as a ,  is in the     Med trials: Lexapro, Lamictal, Zoloft    Manic/Hypomanic Symptom Screening:  Since the last session, have you had any periods lasting at least a few days where your energy level was higher than normal and you did not need as much sleep " "at night to function the following day?: No    Substance Use Screening:  Denies    Review of Measures  PHQ-9: 5  LEBRON-7: 5    Scores from last session:   PHQ-9: 2  LEBRON-7: 3    Scores from initial session:  PHQ9 Score:              8/27  GAD7 Score:              7/21    Review of PDMP  Reviewed this session Yes, describe: No changes    Constitutional     VITAL SIGNS  Temp     BP      HR      RR      SpO2           MENTAL STATUS EXAM  General:  Alert and oriented x 4 Appearance is good grooming and hygiene, appears stated age, pregnant, Body mass index is 33.02 kg/m².  Behavior: friendly and cooperative, eye contact normal.  Gait, Posture and Muscle Strength/Tone: Normal posture observed. No gross abnormal movements noted.  Psychomotor Agitation and Retardation: none evident  Speech: Normal rate, volume, articulation, and tone.  Mood: "a little irritability"  Affect: mildly anxious  Thought Process: Linear and coherent. No flight of ideas.  Associations:  Intact. No loosening of associations.  Thought content: Denies suicidal and homicidal thoughts, plans and intentions.  Perceptions: Denies any auditory or visual hallucinations. Does not appear internally preoccupied.  Orientation: Alert and oriented to person, place, date and situation  Attention and Concentration: Intact.   Memory: Intact grossly   Language: No deficits noted   Fund of Knowledge: appropriate for age and level and education.   Insight:   good  Judgment: good  Impulse control: good      ASSESSMENT  Problem List Items Addressed This Visit          Psychiatric    LEBRON (generalized anxiety disorder) - Primary    Relevant Medications    buPROPion (WELLBUTRIN XL) 150 MG TB24 tablet     Reviewed the literature with patient. There is not a lot of studies on wellbutrin, but there appears to be a low risk of cardiac abnormalities and ADHD in fetuses exposed in the first trimester. Sudeep feels comfortable getting back on medication after weighing pros and " cons. She was offered alternative of using occasional benzos to manage panic attacks. Communicated over secure chat with OB Dr. Jeansonne, who is okay with restarting the medication.    PLAN  Patient Instructions   Ok to start back on Wellbutrin XL 150mg if Ob is okay with the plan    Transition to Dr. Gomes.      -------------------------------------------------------------------------------    Janell Ruiz  Jewish - PSYCHIATRY    Today's encounter took a total time of 30 minutes, and that time included interview and documentation.    Due to the pandemic, today's session was performed over video. Patient is confirmed to [place] in the [place]. The participants are patient and myself.     Risks, Benefits, Side Effects and Alternatives were discussed with the patient today and consent was obtained for the current medication regimen. The patient was encouraged to ask questions and these questions were answered and the patient was engaged in psychoeducation regarding diagnosis, course of illness, accuracy of the diagnosis, and other general elements regarding overall diagnosis and treatment consideration.     Any medications being used off-label were discussed with the patient inclusive of the evidence base for the use of the medications and consent was obtained for the off-label use of the medication.     Brief suicide risk assessment was performed with the patient today and safety planning was performed if indicated.    Note completed by: Janell Ruiz MD, 6/15/2023 9:33 AM

## 2023-06-21 ENCOUNTER — PATIENT MESSAGE (OUTPATIENT)
Dept: OBSTETRICS AND GYNECOLOGY | Facility: CLINIC | Age: 33
End: 2023-06-21
Payer: OTHER GOVERNMENT

## 2023-07-03 ENCOUNTER — CLINICAL SUPPORT (OUTPATIENT)
Dept: OBSTETRICS AND GYNECOLOGY | Facility: CLINIC | Age: 33
End: 2023-07-03
Payer: OTHER GOVERNMENT

## 2023-07-03 DIAGNOSIS — N91.2 AMENORRHEA: Primary | ICD-10-CM

## 2023-07-03 PROCEDURE — 99999 PR PBB SHADOW E&M-EST. PATIENT-LVL I: CPT | Mod: PBBFAC,,,

## 2023-07-03 PROCEDURE — 99999 PR PBB SHADOW E&M-EST. PATIENT-LVL I: ICD-10-PCS | Mod: PBBFAC,,,

## 2023-07-03 PROCEDURE — 99211 OFF/OP EST MAY X REQ PHY/QHP: CPT | Mod: PBBFAC

## 2023-07-10 ENCOUNTER — OFFICE VISIT (OUTPATIENT)
Dept: PSYCHIATRY | Facility: CLINIC | Age: 33
End: 2023-07-10
Payer: OTHER GOVERNMENT

## 2023-07-10 DIAGNOSIS — Z3A.09 9 WEEKS GESTATION OF PREGNANCY: ICD-10-CM

## 2023-07-10 DIAGNOSIS — F41.1 GAD (GENERALIZED ANXIETY DISORDER): Primary | ICD-10-CM

## 2023-07-10 PROCEDURE — 99214 OFFICE O/P EST MOD 30 MIN: CPT | Mod: 95,,, | Performed by: INTERNAL MEDICINE

## 2023-07-10 PROCEDURE — 99214 PR OFFICE/OUTPT VISIT, EST, LEVL IV, 30-39 MIN: ICD-10-PCS | Mod: 95,,, | Performed by: INTERNAL MEDICINE

## 2023-07-10 NOTE — PROGRESS NOTES
"OUTPATIENT PSYCHIATRY INITIAL VISIT    ENCOUNTER DATE:  7/10/23  SITE:  [facility], [facility]  REFFERAL SOURCE:  No ref. provider found  LENGTH OF SESSION:  25 minutes    The patient location is:  [place], not in a healthcare facility  Visit type:  audiovisual    Face to Face time with patient:  25 minutes  40 minutes of total time spent on the encounter, which includes face to face time and non-face to face time preparing to see the patient (eg, review of tests), Obtaining and/or reviewing separately obtained history, Documenting clinical information in the electronic or other health record, Independently interpreting results (not separately reported) and communicating results to the patient/family/caregiver, or Care coordination (not separately reported).     Each patient to whom he or she provides medical services by telemedicine is:  (1) informed of the relationship between the physician and patient and the respective role of any other health care provider with respect to management of the patient; and (2) notified that he or she may decline to receive medical services by telemedicine and may withdraw from such care at any time.      CHIEF COMPLAINT:   Mood      HISTORY OF PRESENTING ILLNESS:  Isabelle Anderson is a 32 y.o. female with history of Anxiety who presents for initial assessment.  She presents as transfer from Dr. Ruiz.    History as told by patient:  9 weeks now - nausea is really bad x 3 weeks now.  Fertility doctor had prescribed something insurance does not cover.  Had virtual appointment with nurse last Monday and they suggested Unisom and B6 - it helps.  Still taking Wellbutrin.  Tried stopping Wellbutrin but within 1-2 weeks had 2 "episodes" - jittery, heart rate got into 150's, tightness in chest, mind was racing and running away from her.  These came out of nowhere.  So she restarted it then.  Has more energy on Wellbutrin, more motivation.  Describes her typical " anxiety has picking at skin around nails, plays with hair repetitively, chew inside of cheeks.  Thoughts are worrying, worst case scenarios.  Scared of heights, for about a month couldn't drive on bridges.  Feeling much better back on Wellbutrin.  Tried 300mg at one point and felt more anxious.  Feels she was always anxious but didn't realize what it was until recently when she saw Dr. Ruiz - was off birth control and trying to have a baby - was crying all the time.  Mother just thought she was a sensitive child.  History of weeks of insomnia where she would nap during the day to rest.  Sleep much better with Wellbutrin.      Works as  at law firm.  Been with  5 years,  3.5 years.  He is miltiary.  2 miscarriages, then IVF.  Built a house, parents are 2 miles up the road - helpful when he is gone for a few months at a time.      Medication side effects:  No  Medication compliance:  Yes    PSYCHIATRIC REVIEW OF SYSTEMS:  Trouble with sleep:  Denies  Appetite changes:  Decreased due to nausea currently  Weight changes:  Not discussed  Lack of energy:  Denies  Anhedonia:  Denies  Somatic symptoms:  Denies  Libido:  Not discussed  Anxiety/panic:  Sometimes but improved back on Wellbutrin  Guilty/hopeless:  Denies  Self-injurious behavior/risky behavior:  Denies  Any drugs:  Denies  Alcohol:  Denies    MEDICAL REVIEW OF SYSTEMS:  Complete review of systems performed covering Constitutional, Eyes, ENT/Mouth, Cardiovascular, Respiratory, Gastrointestinal, Genitourinary, Musculoskeletal, Skin, Neurologic, Endocrine, and Allergy/Immune.  All systems negative except for that discussed in HPI.    PAST PSYCHIATRIC HISTORY:  Previous Psychiatric Diagnoses:  Anxiety  Previous Psychiatric Hospitalizations:  Denies   Previous SI/HI:  Denies  Previous Suicide Attempts:  Denies   Previous Medication Trials:  Zoloft (restless leg, dizzy, zombie), Lexapro (drowsy, restless leg, jittery), Lamictal (felt good on  it but after 2-3 months got rash and lip swelling)  Psychiatric Care (current & past):  Dr. Ruiz  History of Psychotherapy:  Briefly after a breakup  History of Violence:  Denies    SUBSTANCE ABUSE HISTORY:  Tobacco:  Denies  Alcohol:  Denies currently (maybe once a year prior to pregnancy)  Illicit Substances:  Denies  Misuse of Prescription Medications:  Denies    MEDICAL HISTORY:  Past Medical History:   Diagnosis Date    Infertility, female     PONV (postoperative nausea and vomiting)        NEUROLOGIC HISTORY:  Seizures:  Denies     SOCIAL HISTORY:  History of Physical/Sexual Abuse:  Denies    Employment:  As above    Relationship Status/Sexual Orientation:  As above   Children:  Denies   Housing Status:  Lives with     FAMILY HISTORY:  Psychiatric:  Denies other than above    MEDICATIONS:    Current Outpatient Medications:     aspirin 81 MG Chew, , Disp: , Rfl:     buPROPion (WELLBUTRIN XL) 150 MG TB24 tablet, Take 1 tablet (150 mg total) by mouth once daily., Disp: 90 tablet, Rfl: 4    CETROTIDE 0.25 mg Kit, SMARTSI Each SUB-Q Daily, Disp: , Rfl:     ergocalciferol, vitamin D2, (VITAMIN D ORAL), , Disp: , Rfl:     estradioL (ESTRACE) 2 MG tablet, Take by mouth., Disp: , Rfl:     estradioL (VIVELLE-DOT) 0.1 mg/24 hr PTSW, Place onto the skin., Disp: , Rfl:     letrozole (FEMARA) 2.5 mg Tab, Take by mouth., Disp: , Rfl:     loratadine (CLARITIN) 10 mg tablet, Take 10 mg by mouth once daily., Disp: , Rfl:     NOVAREL 5,000 unit SolR, Inject into the muscle., Disp: , Rfl:     prenatal 25-iron-folate 6-dha 30 mg iron-1mg -200 mg Cap, Take by mouth., Disp: , Rfl:     progesterone (PROGESTERONE IN OIL) 50 mg/mL injection, Inject into the muscle., Disp: , Rfl:     ubidecarenone (COQ-10 ORAL), , Disp: , Rfl:     ALLERGIES:  Review of patient's allergies indicates:   Allergen Reactions    Lamictal [lamotrigine] Rash    Codeine Nausea And Vomiting     Can take other narcotics with nausea meds    Latex,  "natural rubber Rash       PSYCHIATRIC EXAM:  There were no vitals filed for this visit.  Appearance:  Well groomed, appearing healthy and of stated age  Behavior:  Cooperative, pleasant, no psychomotor agitation or retardation  Speech:  Normal rate, rhythm, prosody, and volume  Mood:  "Good"  Affect:  Congruent  Thought Process:  Linear, logical, goal directed  Thought Content:  Negative for suicidal ideation, homicidal ideation, delusions or hallucinations.  Associations:  Intact  Memory:  Grossly Intact  Level of Consciousness/Orientation:  Grossly intact  Fund of Knowledge:  Good  Attention:  Good  Language:  Fluent, able to name abstract and concrete objects  Insight:  Good  Judgment:  Intact  Psychomotor signs:  No involuntary movements of face  Gait: Unable to assess via virtual visit    RELEVANT LABS/STUDIES:  Lab Results   Component Value Date    WBC 6.35 07/25/2022    HGB 14.0 07/25/2022    HCT 42.3 07/25/2022    MCV 88 07/25/2022     07/25/2022     BMP  Lab Results   Component Value Date     07/25/2022    K 4.9 07/25/2022     07/25/2022    CO2 22 (L) 07/25/2022    BUN 15 07/25/2022    CREATININE 0.8 07/25/2022    CALCIUM 9.9 07/25/2022    ANIONGAP 13 07/25/2022    ESTGFRAFRICA >60 07/25/2022    EGFRNONAA >60 07/25/2022     Lab Results   Component Value Date    ALT 29 07/25/2022    AST 21 07/25/2022    ALKPHOS 62 07/25/2022    BILITOT 0.6 07/25/2022     Lab Results   Component Value Date    TSH 0.516 07/25/2022     No results found for: LABA1C, HGBA1C    IMPRESSION:    Isabelle Anderson is a 32 y.o. female with history of Anxiety who presents for initial assessment.    Status/Progress:  Based on the examination today, the patient's problem(s) is/are well controlled.  New problems have been presented today.    Risk Parameters:  Patient reports no suicidal ideation  Patient reports no homicidal ideation  Patient reports no self-injurious behavior  Patient reports no violent " behavior    DIAGNOSES:    ICD-10-CM ICD-9-CM   1. LEBRON (generalized anxiety disorder)  F41.1 300.02   2. 9 weeks gestation of pregnancy  Z3A.09 V22.2       PLAN:  Patient feels mood is currently stable on Wellbutrin.  Again discussed risks versus benefits during pregnancy, which she has already discussed with Dr. Ruiz.  She is aware that most studies do not show risk of birth defects, however there are a few small studies that suggest slightly increased risk of cardiovascular anomalies after first trimester exposure.  There is also possible increased risk of miscarriage, lowered seizure threshold, and ADHD in offspring, however these have not been studied enough to know for sure.  She feels that benefits outweigh risks at this time, as being off of Wellbutrin during pregnancy exposes the baby to uncontrolled illness in mother.  Continue Wellbutrin XL 150mg daily.  Discussed with patient informed consent, risks versus benefits, alternative treatments, side effect profile and the inherent unpredictability of individual responses to these treatments.  The patient expresses understanding of the above and displays the capacity to agree with this current plan.    RETURN TO CLINIC:  Follow up in about 2 months (around 9/10/2023).

## 2023-08-01 ENCOUNTER — PATIENT MESSAGE (OUTPATIENT)
Dept: OBSTETRICS AND GYNECOLOGY | Facility: CLINIC | Age: 33
End: 2023-08-01
Payer: OTHER GOVERNMENT

## 2023-08-01 RX ORDER — LANOLIN ALCOHOL/MO/W.PET/CERES
400 CREAM (GRAM) TOPICAL DAILY
Qty: 30 TABLET | Refills: 1 | Status: SHIPPED | OUTPATIENT
Start: 2023-08-01 | End: 2023-11-06

## 2023-08-04 ENCOUNTER — HOSPITAL ENCOUNTER (OUTPATIENT)
Dept: PERINATAL CARE | Facility: OTHER | Age: 33
Discharge: HOME | End: 2023-08-04
Attending: OBSTETRICS & GYNECOLOGY
Payer: OTHER GOVERNMENT

## 2023-08-04 ENCOUNTER — INITIAL PRENATAL (OUTPATIENT)
Dept: OBSTETRICS AND GYNECOLOGY | Facility: CLINIC | Age: 33
End: 2023-08-04
Payer: OTHER GOVERNMENT

## 2023-08-04 VITALS
BODY MASS INDEX: 31.39 KG/M2 | WEIGHT: 160.69 LBS | DIASTOLIC BLOOD PRESSURE: 90 MMHG | SYSTOLIC BLOOD PRESSURE: 145 MMHG

## 2023-08-04 DIAGNOSIS — O09.92 SUPERVISION OF HIGH RISK PREGNANCY IN SECOND TRIMESTER: Primary | ICD-10-CM

## 2023-08-04 DIAGNOSIS — F41.9 ANXIETY IN PREGNANCY, ANTEPARTUM: ICD-10-CM

## 2023-08-04 DIAGNOSIS — O99.340 ANXIETY IN PREGNANCY, ANTEPARTUM: ICD-10-CM

## 2023-08-04 DIAGNOSIS — N91.2 AMENORRHEA: ICD-10-CM

## 2023-08-04 DIAGNOSIS — O09.812 PREGNANCY RESULTING FROM IN VITRO FERTILIZATION IN SECOND TRIMESTER: ICD-10-CM

## 2023-08-04 PROCEDURE — 99999 PR PBB SHADOW E&M-EST. PATIENT-LVL III: CPT | Mod: PBBFAC,,, | Performed by: OBSTETRICS & GYNECOLOGY

## 2023-08-04 PROCEDURE — 76801 OB US < 14 WKS SINGLE FETUS: CPT | Mod: 26,,, | Performed by: OBSTETRICS & GYNECOLOGY

## 2023-08-04 PROCEDURE — 99213 OFFICE O/P EST LOW 20 MIN: CPT | Mod: PBBFAC,25 | Performed by: OBSTETRICS & GYNECOLOGY

## 2023-08-04 PROCEDURE — 99999 PR PBB SHADOW E&M-EST. PATIENT-LVL III: ICD-10-PCS | Mod: PBBFAC,,, | Performed by: OBSTETRICS & GYNECOLOGY

## 2023-08-04 PROCEDURE — 76801 OB US < 14 WKS SINGLE FETUS: CPT

## 2023-08-04 PROCEDURE — 0502F SUBSEQUENT PRENATAL CARE: CPT | Mod: ,,, | Performed by: OBSTETRICS & GYNECOLOGY

## 2023-08-04 PROCEDURE — 0502F PR SUBSEQUENT PRENATAL CARE: ICD-10-PCS | Mod: ,,, | Performed by: OBSTETRICS & GYNECOLOGY

## 2023-08-04 PROCEDURE — 76813 OB US NUCHAL MEAS 1 GEST: CPT | Mod: 26,,, | Performed by: OBSTETRICS & GYNECOLOGY

## 2023-08-04 PROCEDURE — 76801 US OB/GYN PROCEDURE (VIEWPOINT): ICD-10-PCS | Mod: 26,,, | Performed by: OBSTETRICS & GYNECOLOGY

## 2023-08-04 PROCEDURE — 76813 US OB/GYN PROCEDURE (VIEWPOINT): ICD-10-PCS | Mod: 26,,, | Performed by: OBSTETRICS & GYNECOLOGY

## 2023-08-04 RX ORDER — ONDANSETRON 4 MG/1
4 TABLET, ORALLY DISINTEGRATING ORAL EVERY 6 HOURS PRN
Qty: 30 TABLET | Refills: 1 | OUTPATIENT
Start: 2023-08-04 | End: 2023-08-16

## 2023-08-04 NOTE — PROGRESS NOTES
No LOF, Ctx, VB. [facility] fertility [place], did IVF. Having girl. Did PGT testing. Mat21 today. PNL were normal. Has been having some jitteriness and some left sided pain at night. Taking ASA and PNV and B12 with unisom. Zofran rx sent. Anatomy ordered. Discussed PNT at 32 weeks due to IVF and 32 weeks growth.     Given precautions.

## 2023-08-15 ENCOUNTER — PATIENT MESSAGE (OUTPATIENT)
Dept: OBSTETRICS AND GYNECOLOGY | Facility: CLINIC | Age: 33
End: 2023-08-15
Payer: OTHER GOVERNMENT

## 2023-08-16 ENCOUNTER — HOSPITAL ENCOUNTER (EMERGENCY)
Facility: OTHER | Age: 33
Discharge: HOME | End: 2023-08-16
Attending: OBSTETRICS & GYNECOLOGY
Payer: OTHER GOVERNMENT

## 2023-08-16 ENCOUNTER — PATIENT MESSAGE (OUTPATIENT)
Dept: OBSTETRICS AND GYNECOLOGY | Facility: CLINIC | Age: 33
End: 2023-08-16
Payer: OTHER GOVERNMENT

## 2023-08-16 VITALS
RESPIRATION RATE: 18 BRPM | HEART RATE: 102 BPM | DIASTOLIC BLOOD PRESSURE: 64 MMHG | OXYGEN SATURATION: 100 % | TEMPERATURE: 100 F | SYSTOLIC BLOOD PRESSURE: 113 MMHG

## 2023-08-16 DIAGNOSIS — R11.2 NAUSEA AND VOMITING, UNSPECIFIED VOMITING TYPE: ICD-10-CM

## 2023-08-16 DIAGNOSIS — U07.1 COVID-19 VIRUS DETECTED: ICD-10-CM

## 2023-08-16 DIAGNOSIS — Z3A.15 15 WEEKS GESTATION OF PREGNANCY: ICD-10-CM

## 2023-08-16 DIAGNOSIS — U07.1 COVID: Primary | ICD-10-CM

## 2023-08-16 LAB
ALBUMIN SERPL BCP-MCNC: 3.4 G/DL (ref 3.5–5.2)
ALP SERPL-CCNC: 42 U/L (ref 55–135)
ALT SERPL W/O P-5'-P-CCNC: 27 U/L (ref 10–44)
ANION GAP SERPL CALC-SCNC: 12 MMOL/L (ref 8–16)
AST SERPL-CCNC: 29 U/L (ref 10–40)
BASOPHILS # BLD AUTO: 0.01 K/UL (ref 0–0.2)
BASOPHILS NFR BLD: 0.2 % (ref 0–1.9)
BILIRUB SERPL-MCNC: 0.4 MG/DL (ref 0.1–1)
BILIRUB SERPL-MCNC: NEGATIVE MG/DL
BLOOD URINE, POC: NEGATIVE
BUN SERPL-MCNC: 6 MG/DL (ref 6–20)
CALCIUM SERPL-MCNC: 9.1 MG/DL (ref 8.7–10.5)
CHLORIDE SERPL-SCNC: 108 MMOL/L (ref 95–110)
CO2 SERPL-SCNC: 19 MMOL/L (ref 23–29)
COLOR, POC UA: YELLOW
CREAT SERPL-MCNC: 0.7 MG/DL (ref 0.5–1.4)
DIFFERENTIAL METHOD: ABNORMAL
EOSINOPHIL # BLD AUTO: 0 K/UL (ref 0–0.5)
EOSINOPHIL NFR BLD: 0.8 % (ref 0–8)
ERYTHROCYTE [DISTWIDTH] IN BLOOD BY AUTOMATED COUNT: 12.4 % (ref 11.5–14.5)
EST. GFR  (NO RACE VARIABLE): >60 ML/MIN/1.73 M^2
GLUCOSE SERPL-MCNC: 94 MG/DL (ref 70–110)
GLUCOSE UR QL STRIP: NORMAL
HCT VFR BLD AUTO: 36.3 % (ref 37–48.5)
HGB BLD-MCNC: 12.3 G/DL (ref 12–16)
IMM GRANULOCYTES # BLD AUTO: 0.01 K/UL (ref 0–0.04)
IMM GRANULOCYTES NFR BLD AUTO: 0.2 % (ref 0–0.5)
KETONES UR QL STRIP: NORMAL
LEUKOCYTE ESTERASE URINE, POC: NORMAL
LYMPHOCYTES # BLD AUTO: 0.2 K/UL (ref 1–4.8)
LYMPHOCYTES NFR BLD: 3.1 % (ref 18–48)
MCH RBC QN AUTO: 28.9 PG (ref 27–31)
MCHC RBC AUTO-ENTMCNC: 33.9 G/DL (ref 32–36)
MCV RBC AUTO: 85 FL (ref 82–98)
MONOCYTES # BLD AUTO: 0.3 K/UL (ref 0.3–1)
MONOCYTES NFR BLD: 5.5 % (ref 4–15)
NEUTROPHILS # BLD AUTO: 4.6 K/UL (ref 1.8–7.7)
NEUTROPHILS NFR BLD: 90.2 % (ref 38–73)
NITRITE, POC UA: NEGATIVE
NRBC BLD-RTO: 0 /100 WBC
PH, POC UA: 7
PLATELET # BLD AUTO: 212 K/UL (ref 150–450)
PMV BLD AUTO: 9.9 FL (ref 9.2–12.9)
POTASSIUM SERPL-SCNC: 4 MMOL/L (ref 3.5–5.1)
PROT SERPL-MCNC: 6.6 G/DL (ref 6–8.4)
PROTEIN, POC: NORMAL
RBC # BLD AUTO: 4.25 M/UL (ref 4–5.4)
SARS-COV-2 RDRP RESP QL NAA+PROBE: POSITIVE
SODIUM SERPL-SCNC: 139 MMOL/L (ref 136–145)
SPECIFIC GRAVITY, POC UA: 1.01
UROBILINOGEN, POC UA: NORMAL
WBC # BLD AUTO: 5.11 K/UL (ref 3.9–12.7)

## 2023-08-16 PROCEDURE — 80053 COMPREHEN METABOLIC PANEL: CPT

## 2023-08-16 PROCEDURE — 81002 URINALYSIS NONAUTO W/O SCOPE: CPT

## 2023-08-16 PROCEDURE — U0002 COVID-19 LAB TEST NON-CDC: HCPCS

## 2023-08-16 PROCEDURE — 99284 PR EMERGENCY DEPT VISIT,LEVEL IV: ICD-10-PCS | Mod: ,,, | Performed by: OBSTETRICS & GYNECOLOGY

## 2023-08-16 PROCEDURE — 96374 THER/PROPH/DIAG INJ IV PUSH: CPT

## 2023-08-16 PROCEDURE — 63600175 PHARM REV CODE 636 W HCPCS

## 2023-08-16 PROCEDURE — 85025 COMPLETE CBC W/AUTO DIFF WBC: CPT

## 2023-08-16 PROCEDURE — 99284 EMERGENCY DEPT VISIT MOD MDM: CPT | Mod: ,,, | Performed by: OBSTETRICS & GYNECOLOGY

## 2023-08-16 PROCEDURE — 99284 EMERGENCY DEPT VISIT MOD MDM: CPT | Mod: 25

## 2023-08-16 PROCEDURE — 96361 HYDRATE IV INFUSION ADD-ON: CPT

## 2023-08-16 RX ORDER — ONDANSETRON 4 MG/1
4 TABLET, ORALLY DISINTEGRATING ORAL ONCE
Status: DISCONTINUED | OUTPATIENT
Start: 2023-08-16 | End: 2023-08-16 | Stop reason: HOSPADM

## 2023-08-16 RX ORDER — ONDANSETRON 2 MG/ML
8 INJECTION INTRAMUSCULAR; INTRAVENOUS ONCE
Status: COMPLETED | OUTPATIENT
Start: 2023-08-16 | End: 2023-08-16

## 2023-08-16 RX ORDER — ONDANSETRON 4 MG/1
4 TABLET, ORALLY DISINTEGRATING ORAL 2 TIMES DAILY
Qty: 30 TABLET | Refills: 0 | Status: SHIPPED | OUTPATIENT
Start: 2023-08-16 | End: 2023-11-06

## 2023-08-16 RX ADMIN — SODIUM CHLORIDE, SODIUM LACTATE, POTASSIUM CHLORIDE, CALCIUM CHLORIDE AND DEXTROSE MONOHYDRATE 1000 ML: 5; 600; 310; 30; 20 INJECTION, SOLUTION INTRAVENOUS at 12:08

## 2023-08-16 RX ADMIN — ONDANSETRON 8 MG: 2 INJECTION INTRAMUSCULAR; INTRAVENOUS at 01:08

## 2023-08-16 NOTE — ED PROVIDER NOTES
Encounter Date: 2023       History     Chief Complaint   Patient presents with    COVID-19 Concerns     HPI    Isabelle Anderson is a 32 y.o. [id number] at 15w0d presents complaining of malaise, body aches and emesis. She took 500 mg of tylenol 2 hours ago without relief. She has vomited 4 times this morning and is unable to keep anything down. She had a positive COVID test at home. Temp at home was 100.2    Patient denies contractions, denies vaginal bleeding, denies LOF. Fetal Movement: normal.     Review of patient's allergies indicates:   Allergen Reactions    Lamictal [lamotrigine] Rash    Codeine Nausea And Vomiting     Can take other narcotics with nausea meds    Latex, natural rubber Rash     Past Medical History:   Diagnosis Date    Infertility, female     PONV (postoperative nausea and vomiting)      Past Surgical History:   Procedure Laterality Date    DILATION AND CURETTAGE OF UTERUS USING SUCTION N/A 05/10/2021    Procedure: DILATION AND CURETTAGE, UTERUS, USING SUCTION;  Surgeon: Julie R. Jeansonne, MD;  Location: [facility];  Service: OB/GYN;  Laterality: N/A;    EYE SURGERY  2018    IVF Egg Retrieval  2023     Family History   Problem Relation Age of Onset    Diabetes Other      Social History     Tobacco Use    Smoking status: Never    Smokeless tobacco: Never   Substance Use Topics    Alcohol use: No    Drug use: No     Review of Systems   Constitutional:  Negative for chills, diaphoresis and fever.   HENT:  Negative for congestion.    Respiratory:  Negative for shortness of breath.    Cardiovascular:  Negative for chest pain and leg swelling.   Gastrointestinal:  Negative for abdominal pain, constipation, nausea and vomiting.   Genitourinary:  Negative for dysuria, vaginal bleeding and vaginal discharge.   Musculoskeletal:  Negative for back pain.   Skin:  Negative for rash.   Neurological:  Negative for light-headedness and headaches.   Psychiatric/Behavioral:  Negative  for confusion.        Physical Exam     Vitals:    23 1236   BP: 113/64   Pulse: 102   Resp:    Temp:          Physical Exam    Constitutional: She appears well-developed and well-nourished.   HENT:   Head: Normocephalic.   Eyes: EOM are normal.   Cardiovascular:  Normal rate.           Pulmonary/Chest: Breath sounds normal. No respiratory distress.   Abdominal: Abdomen is soft. She exhibits no distension. There is no abdominal tenderness. There is no rebound and no guarding.     Neurological: She is alert and oriented to person, place, and time.   Skin: Skin is warm and dry.   Psychiatric: She has a normal mood and affect. Thought content normal.       ED Course   Procedures  Labs Reviewed   SARS-COV-2 RNA AMPLIFICATION, QUAL - Abnormal; Notable for the following components:       Result Value    SARS-CoV-2 RNA, Amplification, Qual Positive (*)     All other components within normal limits   CBC W/ AUTO DIFFERENTIAL - Abnormal; Notable for the following components:    Hematocrit 36.3 (*)     Lymph # 0.2 (*)     Gran % 90.2 (*)     Lymph % 3.1 (*)     All other components within normal limits   COMPREHENSIVE METABOLIC PANEL - Abnormal; Notable for the following components:    CO2 19 (*)     Albumin 3.4 (*)     Alkaline Phosphatase 42 (*)     All other components within normal limits   POCT URINALYSIS, DIPSTICK OR TABLET REAGENT, AUTOMATED, WITH MICROSCOP        FHT: 150    Imaging Results    None          Medications   dextrose 5% lactated ringers bolus 1,000 mL (0 mLs Intravenous Stopped 23 1430)   ondansetron injection 8 mg (8 mg Intravenous Given 23 1358)     Medical Decision Making:   Initial Assessment:   Isabelle Anderson is a 32 y.o. [id number] at 15w0d  with COVID  ED Management:  - VSS, afebrile, pulse 102  - Urine dip +3 ketones  - COVID positive  - given D5 LR 1000ml bolus and ondansetron for nausea  - Patient stable for discharge  - DC on nirmatrelvir-ritonavir and zofran PRN for  nausea            Attending Attestation:   Physician Attestation Statement for Resident:  As the supervising MD   Physician Attestation Statement: I have personally seen and examined this patient.   I agree with the above history.  -:   As the supervising MD I agree with the above PE.     As the supervising MD I agree with the above treatment, course, plan, and disposition.   -: Normal labs. Tolerating po challenge. Given IVF.  Normal saturations during walk test.  Discussed paxlovid with patient and risks/benefits discussed of medications.  Pt interested in taking and Zofran given for nausea/vomiting.  FHTs +.  I was personally present during the critical portions of the procedure(s) performed by the resident and was immediately available in the ED to provide services and assistance as needed during the entire procedure.                      Andrea Richmond MD  Obstetrics and Gynecology- PGY1         Clinical Impression:   Final diagnoses:  [U07.1] COVID (Primary)  [R11.2] Nausea and vomiting, unspecified vomiting type        ED Disposition Condition    Discharge Stable          ED Prescriptions       Medication Sig Dispense Start Date End Date Auth. Provider    nirmatrelvir-ritonavir 300 mg (150 mg x 2)-100 mg copackaged tablets (EUA) Take 3 tablets by mouth 2 (two) times daily. Each dose contains 2 nirmatrelvir (pink tablets) and 1 ritonavir (white tablet). Take all 3 tablets together 30 tablet 8/16/2023 8/21/2023 Andrea Norman MD    ondansetron (ZOFRAN-ODT) 4 MG TbDL Take 1 tablet (4 mg total) by mouth 2 (two) times daily. 30 tablet 8/16/2023 -- Jackson Sotelo MD          Follow-up Information    None          Andrea Norman MD  Resident  08/16/23 1731       Zeinab Noble MD  08/16/23 5911

## 2023-08-23 ENCOUNTER — PATIENT MESSAGE (OUTPATIENT)
Dept: OTHER | Facility: OTHER | Age: 33
End: 2023-08-23
Payer: OTHER GOVERNMENT

## 2023-08-30 ENCOUNTER — PATIENT MESSAGE (OUTPATIENT)
Dept: OTHER | Facility: OTHER | Age: 33
End: 2023-08-30
Payer: OTHER GOVERNMENT

## 2023-09-05 ENCOUNTER — LAB VISIT (OUTPATIENT)
Dept: LAB | Facility: OTHER | Age: 33
End: 2023-09-05
Payer: OTHER GOVERNMENT

## 2023-09-05 ENCOUNTER — PATIENT MESSAGE (OUTPATIENT)
Dept: OBSTETRICS AND GYNECOLOGY | Facility: CLINIC | Age: 33
End: 2023-09-05

## 2023-09-05 ENCOUNTER — ROUTINE PRENATAL (OUTPATIENT)
Dept: OBSTETRICS AND GYNECOLOGY | Facility: CLINIC | Age: 33
End: 2023-09-05
Payer: OTHER GOVERNMENT

## 2023-09-05 VITALS — DIASTOLIC BLOOD PRESSURE: 94 MMHG | SYSTOLIC BLOOD PRESSURE: 133 MMHG | WEIGHT: 160.25 LBS | BODY MASS INDEX: 31.3 KG/M2

## 2023-09-05 DIAGNOSIS — Z3A.17 17 WEEKS GESTATION OF PREGNANCY: Primary | ICD-10-CM

## 2023-09-05 DIAGNOSIS — Z13.79 ENCOUNTER FOR GENETIC SCREENING: ICD-10-CM

## 2023-09-05 DIAGNOSIS — Z3A.17 17 WEEKS GESTATION OF PREGNANCY: ICD-10-CM

## 2023-09-05 PROCEDURE — 36415 COLL VENOUS BLD VENIPUNCTURE: CPT

## 2023-09-05 PROCEDURE — 0502F SUBSEQUENT PRENATAL CARE: CPT | Mod: ,,,

## 2023-09-05 PROCEDURE — 99999 PR PBB SHADOW E&M-EST. PATIENT-LVL III: ICD-10-PCS | Mod: PBBFAC,,,

## 2023-09-05 PROCEDURE — 99213 OFFICE O/P EST LOW 20 MIN: CPT | Mod: PBBFAC

## 2023-09-05 PROCEDURE — 82105 ALPHA-FETOPROTEIN SERUM: CPT

## 2023-09-05 PROCEDURE — 99999 PR PBB SHADOW E&M-EST. PATIENT-LVL III: CPT | Mod: PBBFAC,,,

## 2023-09-05 PROCEDURE — 0502F PR SUBSEQUENT PRENATAL CARE: ICD-10-PCS | Mod: ,,,

## 2023-09-05 NOTE — PATIENT INSTRUCTIONS
LABOR AND DELIVERY PHONE NUMBER, [phone] (OPEN 24/7, LOCATED ON 6TH FLOOR OF HOSPITAL)  [address] PHONE NUMBER, [phone] (OPEN MON-FRI, 8a-5p)

## 2023-09-05 NOTE — PROGRESS NOTES
Here for routine OB appt at 17w6d, with no complaints. Denies VB and cramping. Unisom and B6 helping with nausea. BP slightly elevated here. All connected MOM BPs are normal. Had COVID early August, feeling better. Discussed AFP, getting today.   - Anatomy scheduled   Pain, bleeding, and PTL precautions given.  F/U scheduled 4 weeks with Dr. Jeansonne

## 2023-09-06 ENCOUNTER — OFFICE VISIT (OUTPATIENT)
Dept: PSYCHIATRY | Facility: CLINIC | Age: 33
End: 2023-09-06
Payer: OTHER GOVERNMENT

## 2023-09-06 ENCOUNTER — PATIENT MESSAGE (OUTPATIENT)
Dept: OBSTETRICS AND GYNECOLOGY | Facility: CLINIC | Age: 33
End: 2023-09-06
Payer: OTHER GOVERNMENT

## 2023-09-06 DIAGNOSIS — Z3A.18 18 WEEKS GESTATION OF PREGNANCY: ICD-10-CM

## 2023-09-06 DIAGNOSIS — F41.1 GAD (GENERALIZED ANXIETY DISORDER): Primary | ICD-10-CM

## 2023-09-06 PROCEDURE — 99213 OFFICE O/P EST LOW 20 MIN: CPT | Mod: 95,,, | Performed by: INTERNAL MEDICINE

## 2023-09-06 PROCEDURE — 99213 PR OFFICE/OUTPT VISIT, EST, LEVL III, 20-29 MIN: ICD-10-PCS | Mod: 95,,, | Performed by: INTERNAL MEDICINE

## 2023-09-06 NOTE — PROGRESS NOTES
OUTPATIENT PSYCHIATRY RETURN VISIT    ENCOUNTER DATE:  9/6/23   SITE:  [facility], [facility]  LENGTH OF SESSION:  10 minutes    The patient location is:  [place], not in a healthcare facility  Visit type:  audiovisual    Face to Face time with patient:  10 minutes  15 minutes of total time spent on the encounter, which includes face to face time and non-face to face time preparing to see the patient (eg, review of tests), Obtaining and/or reviewing separately obtained history, Documenting clinical information in the electronic or other health record, Independently interpreting results (not separately reported) and communicating results to the patient/family/caregiver, or Care coordination (not separately reported).     Each patient to whom he or she provides medical services by telemedicine is:  (1) informed of the relationship between the physician and patient and the respective role of any other health care provider with respect to management of the patient; and (2) notified that he or she may decline to receive medical services by telemedicine and may withdraw from such care at any time.    CHIEF COMPLAINT:  Anxiety      HISTORY OF PRESENTING ILLNESS:  Isabelle Anderson is a 33 y.o. female with history of Anxiety who presents for follow up appointment.      Plan at last appointment on 7/10/2023:  Patient feels mood is currently stable on Wellbutrin.  Again discussed risks versus benefits during pregnancy, which she has already discussed with Dr. Ruiz.  She is aware that most studies do not show risk of birth defects, however there are a few small studies that suggest slightly increased risk of cardiovascular anomalies after first trimester exposure.  There is also possible increased risk of miscarriage, lowered seizure threshold, and ADHD in offspring, however these have not been studied enough to know for sure.  She feels that benefits outweigh risks at this time, as being off of  Wellbutrin during pregnancy exposes the baby to uncontrolled illness in mother.  Continue Wellbutrin XL 150mg daily.  Discussed with patient informed consent, risks versus benefits, alternative treatments, side effect profile and the inherent unpredictability of individual responses to these treatments.  The patient expresses understanding of the above and displays the capacity to agree with this current plan.    History as told by patient:  Got COVID a few weeks ago.  Threw up a few times, had fever - told to go into Ob ED - gave her Paxlovid.  Was out for 1.5 weeks.  Was feeling very weak.  She is back to baseline now.  Has a little bit of a lingering cough.  Anxiety is much better.  Got a doppler at home in case she gets anxious about not feeling the baby.  This has really helped.  Felt down on Sunday but no persistent depression at all.  Just felt cranky that day.  Taking Unisom and B6 for nausea - this helps her sleep at night.   is [employer] -  for the [employer].      Medication side effects:  No  Medication compliance:  Yes    PSYCHIATRIC REVIEW OF SYSTEMS:  Trouble with sleep:  Sleeping well on Unisom  Appetite changes:  Decreased with COVID  Weight changes:  Not discussed  Lack of energy:  Denies  Anhedonia:  Denies  Somatic symptoms:  Denies  Libido:  Not discussed  Anxiety/panic:  As above  Guilty/hopeless:  Denies  Self-injurious behavior/risky behavior:  Denies  Any drugs:  Denies  Alcohol:  Denies    MEDICAL REVIEW OF SYSTEMS:  Complete review of systems performed covering Constitutional, Musculoskeletal, Neurologic.  All systems negative except for that covered in HPI.    PAST PSYCHIATRIC, MEDICAL, AND SOCIAL HISTORY REVIEWED  The patient's past medical, family and social history have been reviewed and updated as appropriate within the electronic medical record - see encounter notes.    MEDICATIONS:    Current Outpatient Medications:     aspirin 81 MG Chew, , Disp: , Rfl:     buPROPion  "(WELLBUTRIN XL) 150 MG TB24 tablet, Take 1 tablet (150 mg total) by mouth once daily., Disp: 90 tablet, Rfl: 4    ergocalciferol, vitamin D2, (VITAMIN D ORAL), , Disp: , Rfl:     magnesium oxide (MAG-OX) 400 mg (241.3 mg magnesium) tablet, Take 1 tablet (400 mg total) by mouth once daily., Disp: 30 tablet, Rfl: 1    NOVAREL 5,000 unit SolR, Inject into the muscle., Disp: , Rfl:     ondansetron (ZOFRAN-ODT) 4 MG TbDL, Take 1 tablet (4 mg total) by mouth 2 (two) times daily., Disp: 30 tablet, Rfl: 0    prenatal 25-iron-folate 6-dha 30 mg iron-1mg -200 mg Cap, Take by mouth., Disp: , Rfl:     ALLERGIES:  Review of patient's allergies indicates:   Allergen Reactions    Lamictal [lamotrigine] Rash    Codeine Nausea And Vomiting     Can take other narcotics with nausea meds    Latex, natural rubber Rash       PSYCHIATRIC EXAM:  There were no vitals filed for this visit.  Appearance:  Well groomed, appearing healthy and of stated age  Behavior:  Cooperative, pleasant, no psychomotor agitation or retardation  Speech:  Normal rate, rhythm, prosody, and volume  Mood:  "Better"  Affect:  Euthymic  Thought Process:  Linear, logical, goal directed  Thought Content:  Negative for suicidal ideation, homicidal ideation, delusions or hallucinations.  Associations:  Intact  Memory:  Grossly Intact  Level of Consciousness/Orientation:  Grossly intact  Fund of Knowledge:  Good  Attention:  Good  Language:  Fluent, able to name abstract and concrete objects  Insight:  Good  Judgment:  Intact  Psychomotor signs:  No involuntary movements of face  Gait:  Unable to assess via virtual visit      RELEVANT LABS/STUDIES:    Lab Results   Component Value Date    WBC 5.11 08/16/2023    HGB 12.3 08/16/2023    HCT 36.3 (L) 08/16/2023    MCV 85 08/16/2023     08/16/2023     BMP  Lab Results   Component Value Date     08/16/2023    K 4.0 08/16/2023     08/16/2023    CO2 19 (L) 08/16/2023    BUN 6 08/16/2023    CREATININE 0.7 " "08/16/2023    CALCIUM 9.1 08/16/2023    ANIONGAP 12 08/16/2023    ESTGFRAFRICA >60 07/25/2022    EGFRNONAA >60 07/25/2022     Lab Results   Component Value Date    ALT 27 08/16/2023    AST 29 08/16/2023    ALKPHOS 42 (L) 08/16/2023    BILITOT 0.4 08/16/2023     Lab Results   Component Value Date    TSH 0.516 07/25/2022     No results found for: "LABA1C", "HGBA1C"    IMPRESSION:    Isabelle Anderson is a 33 y.o. female with history of Anxiety who presents for follow up appointment.    Status/Progress:  Based on the examination today, the patient's problem(s) is/are well controlled.  New problems have not been presented today.     Risk Parameters:  Patient reports no suicidal ideation  Patient reports no homicidal ideation  Patient reports no self-injurious behavior  Patient reports no violent behavior      DIAGNOSES:    ICD-10-CM ICD-9-CM   1. LEBRON (generalized anxiety disorder)  F41.1 300.02   2. 18 weeks gestation of pregnancy  Z3A.18 V22.2       PLAN:  Mood is currently well controlled.  Continue Wellbutrin XL 150mg daily.  Discussed with patient informed consent, risks versus benefits, alternative treatments, side effect profile and the inherent unpredictability of individual responses to these treatments.  The patient expresses understanding of the above and displays the capacity to agree with this current plan.    RETURN TO CLINIC:  Follow up in about 3 months (around 12/6/2023). 2-3 months    "

## 2023-09-08 LAB
# FETUSES US: NORMAL
AFP INTERPRETATION: NORMAL
AFP MOM SERPL: 0.71
AFP SERPL-MCNC: 30 NG/ML
AFP SERPL-MCNC: NEGATIVE NG/ML
AGE AT DELIVERY: 32
GA (DAYS): 6 D
GA (WEEKS): 17 WK
GESTATIONAL AGE METHOD: NORMAL
IDDM PATIENT QL: NORMAL
SMOKING STATUS FTND: NO

## 2023-09-17 ENCOUNTER — PATIENT MESSAGE (OUTPATIENT)
Dept: OBSTETRICS AND GYNECOLOGY | Facility: CLINIC | Age: 33
End: 2023-09-17
Payer: OTHER GOVERNMENT

## 2023-09-18 ENCOUNTER — PROCEDURE VISIT (OUTPATIENT)
Dept: MATERNAL FETAL MEDICINE | Facility: CLINIC | Age: 33
End: 2023-09-18
Payer: OTHER GOVERNMENT

## 2023-09-18 DIAGNOSIS — O09.92 SUPERVISION OF HIGH RISK PREGNANCY IN SECOND TRIMESTER: ICD-10-CM

## 2023-09-18 PROCEDURE — 76811 OB US DETAILED SNGL FETUS: CPT | Mod: PBBFAC | Performed by: OBSTETRICS & GYNECOLOGY

## 2023-09-18 PROCEDURE — 76817 US MFM PROCEDURE (VIEWPOINT): ICD-10-PCS | Mod: 26,S$PBB,, | Performed by: OBSTETRICS & GYNECOLOGY

## 2023-09-18 PROCEDURE — 76817 TRANSVAGINAL US OBSTETRIC: CPT | Mod: 26,S$PBB,, | Performed by: OBSTETRICS & GYNECOLOGY

## 2023-09-18 PROCEDURE — 76811 US MFM PROCEDURE (VIEWPOINT): ICD-10-PCS | Mod: 26,S$PBB,, | Performed by: OBSTETRICS & GYNECOLOGY

## 2023-09-19 ENCOUNTER — PATIENT MESSAGE (OUTPATIENT)
Dept: OBSTETRICS AND GYNECOLOGY | Facility: CLINIC | Age: 33
End: 2023-09-19
Payer: OTHER GOVERNMENT

## 2023-09-19 DIAGNOSIS — Z36.2 ENCOUNTER FOR FOLLOW-UP ULTRASOUND OF FETAL ANATOMY: Primary | ICD-10-CM

## 2023-09-20 ENCOUNTER — PATIENT MESSAGE (OUTPATIENT)
Dept: OTHER | Facility: OTHER | Age: 33
End: 2023-09-20
Payer: OTHER GOVERNMENT

## 2023-10-06 ENCOUNTER — ROUTINE PRENATAL (OUTPATIENT)
Dept: OBSTETRICS AND GYNECOLOGY | Facility: CLINIC | Age: 33
End: 2023-10-06
Payer: OTHER GOVERNMENT

## 2023-10-06 VITALS
SYSTOLIC BLOOD PRESSURE: 122 MMHG | BODY MASS INDEX: 31.56 KG/M2 | WEIGHT: 161.63 LBS | DIASTOLIC BLOOD PRESSURE: 86 MMHG

## 2023-10-06 DIAGNOSIS — Z34.92 ENCOUNTER FOR SUPERVISION OF NORMAL PREGNANCY IN SECOND TRIMESTER, UNSPECIFIED GRAVIDITY: Primary | ICD-10-CM

## 2023-10-06 PROCEDURE — 99999 PR PBB SHADOW E&M-EST. PATIENT-LVL III: CPT | Mod: PBBFAC,,, | Performed by: OBSTETRICS & GYNECOLOGY

## 2023-10-06 PROCEDURE — 0502F PR SUBSEQUENT PRENATAL CARE: ICD-10-PCS | Mod: ,,, | Performed by: OBSTETRICS & GYNECOLOGY

## 2023-10-06 PROCEDURE — 99213 OFFICE O/P EST LOW 20 MIN: CPT | Mod: PBBFAC | Performed by: OBSTETRICS & GYNECOLOGY

## 2023-10-06 PROCEDURE — 0502F SUBSEQUENT PRENATAL CARE: CPT | Mod: ,,, | Performed by: OBSTETRICS & GYNECOLOGY

## 2023-10-06 PROCEDURE — 99999 PR PBB SHADOW E&M-EST. PATIENT-LVL III: ICD-10-PCS | Mod: PBBFAC,,, | Performed by: OBSTETRICS & GYNECOLOGY

## 2023-10-16 ENCOUNTER — PROCEDURE VISIT (OUTPATIENT)
Dept: MATERNAL FETAL MEDICINE | Facility: CLINIC | Age: 33
End: 2023-10-16
Payer: OTHER GOVERNMENT

## 2023-10-16 DIAGNOSIS — Z36.2 ENCOUNTER FOR FOLLOW-UP ULTRASOUND OF FETAL ANATOMY: ICD-10-CM

## 2023-10-16 DIAGNOSIS — O44.40 LOW-LYING PLACENTA: Primary | ICD-10-CM

## 2023-10-16 PROCEDURE — 76816 OB US FOLLOW-UP PER FETUS: CPT | Mod: PBBFAC | Performed by: OBSTETRICS & GYNECOLOGY

## 2023-10-16 PROCEDURE — 76816 US MFM PROCEDURE (VIEWPOINT): ICD-10-PCS | Mod: 26,S$PBB,, | Performed by: OBSTETRICS & GYNECOLOGY

## 2023-10-18 ENCOUNTER — PATIENT MESSAGE (OUTPATIENT)
Dept: OTHER | Facility: OTHER | Age: 33
End: 2023-10-18
Payer: OTHER GOVERNMENT

## 2023-11-01 ENCOUNTER — PATIENT MESSAGE (OUTPATIENT)
Dept: OTHER | Facility: OTHER | Age: 33
End: 2023-11-01
Payer: OTHER GOVERNMENT

## 2023-11-01 ENCOUNTER — PATIENT MESSAGE (OUTPATIENT)
Dept: OBSTETRICS AND GYNECOLOGY | Facility: CLINIC | Age: 33
End: 2023-11-01
Payer: OTHER GOVERNMENT

## 2023-11-06 ENCOUNTER — ROUTINE PRENATAL (OUTPATIENT)
Dept: OBSTETRICS AND GYNECOLOGY | Facility: CLINIC | Age: 33
End: 2023-11-06
Payer: OTHER GOVERNMENT

## 2023-11-06 ENCOUNTER — LAB VISIT (OUTPATIENT)
Dept: LAB | Facility: OTHER | Age: 33
End: 2023-11-06
Attending: OBSTETRICS & GYNECOLOGY
Payer: OTHER GOVERNMENT

## 2023-11-06 VITALS
BODY MASS INDEX: 31.99 KG/M2 | WEIGHT: 163.81 LBS | DIASTOLIC BLOOD PRESSURE: 92 MMHG | SYSTOLIC BLOOD PRESSURE: 124 MMHG

## 2023-11-06 DIAGNOSIS — Z3A.26 26 WEEKS GESTATION OF PREGNANCY: Primary | ICD-10-CM

## 2023-11-06 DIAGNOSIS — Z34.92 ENCOUNTER FOR SUPERVISION OF NORMAL PREGNANCY IN SECOND TRIMESTER, UNSPECIFIED GRAVIDITY: ICD-10-CM

## 2023-11-06 DIAGNOSIS — Z3A.26 26 WEEKS GESTATION OF PREGNANCY: ICD-10-CM

## 2023-11-06 LAB
ALBUMIN SERPL BCP-MCNC: 3.2 G/DL (ref 3.5–5.2)
ALP SERPL-CCNC: 52 U/L (ref 55–135)
ALT SERPL W/O P-5'-P-CCNC: 10 U/L (ref 10–44)
ANION GAP SERPL CALC-SCNC: 11 MMOL/L (ref 8–16)
AST SERPL-CCNC: 13 U/L (ref 10–40)
BASOPHILS # BLD AUTO: 0.01 K/UL (ref 0–0.2)
BASOPHILS NFR BLD: 0.1 % (ref 0–1.9)
BILIRUB SERPL-MCNC: 0.3 MG/DL (ref 0.1–1)
BUN SERPL-MCNC: 9 MG/DL (ref 6–20)
CALCIUM SERPL-MCNC: 9 MG/DL (ref 8.7–10.5)
CHLORIDE SERPL-SCNC: 109 MMOL/L (ref 95–110)
CO2 SERPL-SCNC: 20 MMOL/L (ref 23–29)
CREAT SERPL-MCNC: 0.7 MG/DL (ref 0.5–1.4)
DIFFERENTIAL METHOD: ABNORMAL
EOSINOPHIL # BLD AUTO: 0.1 K/UL (ref 0–0.5)
EOSINOPHIL NFR BLD: 0.9 % (ref 0–8)
ERYTHROCYTE [DISTWIDTH] IN BLOOD BY AUTOMATED COUNT: 13 % (ref 11.5–14.5)
EST. GFR  (NO RACE VARIABLE): >60 ML/MIN/1.73 M^2
GLUCOSE SERPL-MCNC: 119 MG/DL (ref 70–110)
GLUCOSE SERPL-MCNC: 120 MG/DL (ref 70–140)
HCT VFR BLD AUTO: 36 % (ref 37–48.5)
HGB BLD-MCNC: 11.9 G/DL (ref 12–16)
IMM GRANULOCYTES # BLD AUTO: 0.03 K/UL (ref 0–0.04)
IMM GRANULOCYTES NFR BLD AUTO: 0.4 % (ref 0–0.5)
LYMPHOCYTES # BLD AUTO: 1.7 K/UL (ref 1–4.8)
LYMPHOCYTES NFR BLD: 24.2 % (ref 18–48)
MCH RBC QN AUTO: 29 PG (ref 27–31)
MCHC RBC AUTO-ENTMCNC: 33.1 G/DL (ref 32–36)
MCV RBC AUTO: 88 FL (ref 82–98)
MONOCYTES # BLD AUTO: 0.4 K/UL (ref 0.3–1)
MONOCYTES NFR BLD: 5.1 % (ref 4–15)
NEUTROPHILS # BLD AUTO: 4.8 K/UL (ref 1.8–7.7)
NEUTROPHILS NFR BLD: 69.3 % (ref 38–73)
NRBC BLD-RTO: 0 /100 WBC
PLATELET # BLD AUTO: 263 K/UL (ref 150–450)
PMV BLD AUTO: 9.7 FL (ref 9.2–12.9)
POTASSIUM SERPL-SCNC: 4 MMOL/L (ref 3.5–5.1)
PROT SERPL-MCNC: 6.5 G/DL (ref 6–8.4)
RBC # BLD AUTO: 4.1 M/UL (ref 4–5.4)
SODIUM SERPL-SCNC: 140 MMOL/L (ref 136–145)
WBC # BLD AUTO: 6.9 K/UL (ref 3.9–12.7)

## 2023-11-06 PROCEDURE — 36415 COLL VENOUS BLD VENIPUNCTURE: CPT

## 2023-11-06 PROCEDURE — 0502F PR SUBSEQUENT PRENATAL CARE: ICD-10-PCS | Mod: ,,,

## 2023-11-06 PROCEDURE — 0502F SUBSEQUENT PRENATAL CARE: CPT | Mod: ,,,

## 2023-11-06 PROCEDURE — 99999 PR PBB SHADOW E&M-EST. PATIENT-LVL III: CPT | Mod: PBBFAC,,,

## 2023-11-06 PROCEDURE — 99999 PR PBB SHADOW E&M-EST. PATIENT-LVL III: ICD-10-PCS | Mod: PBBFAC,,,

## 2023-11-06 PROCEDURE — 99213 OFFICE O/P EST LOW 20 MIN: CPT | Mod: PBBFAC

## 2023-11-06 PROCEDURE — 85025 COMPLETE CBC W/AUTO DIFF WBC: CPT | Performed by: OBSTETRICS & GYNECOLOGY

## 2023-11-06 PROCEDURE — 82950 GLUCOSE TEST: CPT | Performed by: OBSTETRICS & GYNECOLOGY

## 2023-11-06 PROCEDURE — 80053 COMPREHEN METABOLIC PANEL: CPT

## 2023-11-06 NOTE — PROGRESS NOTES
Here for routine OB appt at 26w5d. Reports good FM.  Denies LOF, denies VB, denies contractions. Ordered breast pump. Deciding between [facility] and [facility] for peds. BP slightly elevated today- 124/92. Denies headache, blurry vision, RUQ pain. States that all BPs at home are normal- reports her BP is only elevated in office.  - Glucose and CBC today, add CMP  - Follow up ultrasound scheduled -LLP  Reviewed warning signs of Labor and Preeclampsia.  Daily FM counts reinforced.  F/U scheduled 4 weeks with Dr. Jeansonne

## 2023-11-06 NOTE — PATIENT INSTRUCTIONS
Call L & D after hours at [phone] for vaginal bleeding, leakage of fluids, contractions 4-5 in one hour, decreased fetal movements ( 10 kicks in 2 hours), headache not relieved by Tylenol, blurry vision, or temp of 100.4 or greater.  Begin doing fetal kick counts, at least 10 movements in 2 hours starting at 28 weeks gestation.  Keep next clinic appointment.

## 2023-11-13 ENCOUNTER — PATIENT MESSAGE (OUTPATIENT)
Dept: OBSTETRICS AND GYNECOLOGY | Facility: CLINIC | Age: 33
End: 2023-11-13
Payer: OTHER GOVERNMENT

## 2023-11-30 ENCOUNTER — OFFICE VISIT (OUTPATIENT)
Dept: PSYCHIATRY | Facility: CLINIC | Age: 33
End: 2023-11-30
Payer: OTHER GOVERNMENT

## 2023-11-30 DIAGNOSIS — F41.1 GAD (GENERALIZED ANXIETY DISORDER): Primary | ICD-10-CM

## 2023-11-30 DIAGNOSIS — Z3A.30 30 WEEKS GESTATION OF PREGNANCY: ICD-10-CM

## 2023-11-30 PROCEDURE — 99213 OFFICE O/P EST LOW 20 MIN: CPT | Mod: 95,,, | Performed by: INTERNAL MEDICINE

## 2023-11-30 PROCEDURE — 99213 PR OFFICE/OUTPT VISIT, EST, LEVL III, 20-29 MIN: ICD-10-PCS | Mod: 95,,, | Performed by: INTERNAL MEDICINE

## 2023-11-30 NOTE — PROGRESS NOTES
OUTPATIENT PSYCHIATRY RETURN VISIT    ENCOUNTER DATE:  11/30/23   SITE:  [facility]  LENGTH OF SESSION:  10 minutes    The patient location is:  [place], not in a healthcare facility  Visit type:  audiovisual    Face to Face time with patient:  10 minutes  15 minutes of total time spent on the encounter, which includes face to face time and non-face to face time preparing to see the patient (eg, review of tests), Obtaining and/or reviewing separately obtained history, Documenting clinical information in the electronic or other health record, Independently interpreting results (not separately reported) and communicating results to the patient/family/caregiver, or Care coordination (not separately reported).     Each patient to whom he or she provides medical services by telemedicine is:  (1) informed of the relationship between the physician and patient and the respective role of any other health care provider with respect to management of the patient; and (2) notified that he or she may decline to receive medical services by telemedicine and may withdraw from such care at any time.    CHIEF COMPLAINT:  Follow-up      HISTORY OF PRESENTING ILLNESS:  Isabelle Anderson is a 33 y.o. female with history of Anxiety who presents for follow up appointment.      Plan at last appointment on 9/6/2023:  Mood is currently well controlled.  Continue Wellbutrin XL 150mg daily.  Discussed with patient informed consent, risks versus benefits, alternative treatments, side effect profile and the inherent unpredictability of individual responses to these treatments.  The patient expresses understanding of the above and displays the capacity to agree with this current plan.    History as told by patient:  Says she has been doing well.  Having indigestion from pregnancy.  Has had some times she gets in her head about her movement but believes she is just busy and not noticing.  Busy with work.  In her  nesting stage - wants to make sure house is ready.  They are watching her low placenta.   will get 12 weeks paternity leave.  Due 2/8/23 - plan to induce due to travel and [place].  She has had more energy lately.  Going to sleep earlier - like 8:30ish.  But may wake up at 4-5am.  Still getting 8 hours but schedule is off.  But this has gotten a little better.      Medication side effects:  No  Medication compliance:  Yes    PSYCHIATRIC REVIEW OF SYSTEMS:  Trouble with sleep:  As above  Appetite changes:  Denies  Weight changes:  Gain with pregnancy  Lack of energy:  Sometimes due to pregnancy  Anhedonia:  Denies  Somatic symptoms:  Denies  Libido:  Not discussed  Anxiety/panic:  Stable  Guilty/hopeless:  Denies  Self-injurious behavior/risky behavior:  Denies  Any drugs:  Denies  Alcohol:  Denies    MEDICAL REVIEW OF SYSTEMS:  Complete review of systems performed covering Constitutional, Musculoskeletal, Neurologic.  All systems negative except for that covered in HPI.    PAST PSYCHIATRIC, MEDICAL, AND SOCIAL HISTORY REVIEWED  The patient's past medical, family and social history have been reviewed and updated as appropriate within the electronic medical record - see encounter notes.    MEDICATIONS:    Current Outpatient Medications:     aspirin 81 MG Chew, , Disp: , Rfl:     buPROPion (WELLBUTRIN XL) 150 MG TB24 tablet, Take 1 tablet (150 mg total) by mouth once daily., Disp: 90 tablet, Rfl: 4    ergocalciferol, vitamin D2, (VITAMIN D ORAL), , Disp: , Rfl:     prenatal 25-iron-folate 6-dha 30 mg iron-1mg -200 mg Cap, Take by mouth., Disp: , Rfl:     ALLERGIES:  Review of patient's allergies indicates:   Allergen Reactions    Lamictal [lamotrigine] Rash    Codeine Nausea And Vomiting     Can take other narcotics with nausea meds    Latex, natural rubber Rash       PSYCHIATRIC EXAM:  There were no vitals filed for this visit.  Appearance:  Well groomed, appearing healthy and of stated age  Behavior:   "Cooperative, pleasant, no psychomotor agitation or retardation  Speech:  Normal rate, rhythm, prosody, and volume  Mood:  "Good"  Affect:  Euthymic  Thought Process:  Linear, logical, goal directed  Thought Content:  Negative for suicidal ideation, homicidal ideation, delusions or hallucinations.  Associations:  Intact  Memory:  Grossly Intact  Level of Consciousness/Orientation:  Grossly intact  Fund of Knowledge:  Good  Attention:  Good  Language:  Fluent, able to name abstract and concrete objects  Insight:  Good  Judgment:  Intact  Psychomotor signs:  No involuntary movements of face  Gait:  Unable to assess via virtual visit      RELEVANT LABS/STUDIES:    Lab Results   Component Value Date    WBC 6.90 11/06/2023    HGB 11.9 (L) 11/06/2023    HCT 36.0 (L) 11/06/2023    MCV 88 11/06/2023     11/06/2023     BMP  Lab Results   Component Value Date     11/06/2023    K 4.0 11/06/2023     11/06/2023    CO2 20 (L) 11/06/2023    BUN 9 11/06/2023    CREATININE 0.7 11/06/2023    CALCIUM 9.0 11/06/2023    ANIONGAP 11 11/06/2023    ESTGFRAFRICA >60 07/25/2022    EGFRNONAA >60 07/25/2022     Lab Results   Component Value Date    ALT 10 11/06/2023    AST 13 11/06/2023    ALKPHOS 52 (L) 11/06/2023    BILITOT 0.3 11/06/2023     Lab Results   Component Value Date    TSH 0.516 07/25/2022     No results found for: "LABA1C", "HGBA1C"    IMPRESSION:    Isabelle Anderson is a 33 y.o. female with history of Anxiety who presents for follow up appointment.    Status/Progress:  Based on the examination today, the patient's problem(s) is/are  stable .  New problems have not been presented today.    Risk Parameters:  Patient reports no suicidal ideation  Patient reports no homicidal ideation  Patient reports no self-injurious behavior  Patient reports no violent behavior      DIAGNOSES:    ICD-10-CM ICD-9-CM   1. LEBRON (generalized anxiety disorder)  F41.1 300.02   2. 30 weeks gestation of pregnancy  Z3A.30 V22.2 "       PLAN:  Mood is currently well controlled.  Continue Wellbutrin XL 150mg daily.  She does plan to breastfeed.  MotherToUfree Fact sheet sent to portal.  Discussed with patient informed consent, risks versus benefits, alternative treatments, side effect profile and the inherent unpredictability of individual responses to these treatments.  The patient expresses understanding of the above and displays the capacity to agree with this current plan.    RETURN TO CLINIC:  Follow up in about 3 months (around 2/29/2024). About 2 weeks after delivery.

## 2023-12-04 ENCOUNTER — ROUTINE PRENATAL (OUTPATIENT)
Dept: OBSTETRICS AND GYNECOLOGY | Facility: CLINIC | Age: 33
End: 2023-12-04
Payer: OTHER GOVERNMENT

## 2023-12-04 VITALS
BODY MASS INDEX: 32.85 KG/M2 | SYSTOLIC BLOOD PRESSURE: 132 MMHG | WEIGHT: 168.19 LBS | DIASTOLIC BLOOD PRESSURE: 94 MMHG

## 2023-12-04 DIAGNOSIS — O09.813 PREGNANCY RESULTING FROM IN VITRO FERTILIZATION IN THIRD TRIMESTER: ICD-10-CM

## 2023-12-04 DIAGNOSIS — O09.893 SUPERVISION OF OTHER HIGH RISK PREGNANCIES, THIRD TRIMESTER: Primary | ICD-10-CM

## 2023-12-04 DIAGNOSIS — O44.40 LOW-LYING PLACENTA: ICD-10-CM

## 2023-12-04 PROCEDURE — 99999 PR PBB SHADOW E&M-EST. PATIENT-LVL III: CPT | Mod: PBBFAC,,, | Performed by: OBSTETRICS & GYNECOLOGY

## 2023-12-04 PROCEDURE — 0502F PR SUBSEQUENT PRENATAL CARE: ICD-10-PCS | Mod: ,,, | Performed by: OBSTETRICS & GYNECOLOGY

## 2023-12-04 PROCEDURE — 99213 OFFICE O/P EST LOW 20 MIN: CPT | Mod: PBBFAC | Performed by: OBSTETRICS & GYNECOLOGY

## 2023-12-04 PROCEDURE — 99999 PR PBB SHADOW E&M-EST. PATIENT-LVL III: ICD-10-PCS | Mod: PBBFAC,,, | Performed by: OBSTETRICS & GYNECOLOGY

## 2023-12-04 PROCEDURE — 0502F SUBSEQUENT PRENATAL CARE: CPT | Mod: ,,, | Performed by: OBSTETRICS & GYNECOLOGY

## 2023-12-04 RX ORDER — PANTOPRAZOLE SODIUM 20 MG/1
20 TABLET, DELAYED RELEASE ORAL DAILY
Qty: 30 TABLET | Refills: 1 | Status: SHIPPED | OUTPATIENT
Start: 2023-12-04 | End: 2024-12-03

## 2023-12-04 NOTE — PROGRESS NOTES
Good FM, no LOF, Ctx, VB. Has been having more GERD. Protonix Rx sent. Bps at home normal per patient. No PreE symptoms. Has f/u US 2 weeks to look at placenta location. Discussed PNT at 36 weeks for IVF. Got tdap, flu. Plans to get RSV 32 weeks.     Given precautions.        Stepan Tello

## 2023-12-13 ENCOUNTER — PATIENT MESSAGE (OUTPATIENT)
Dept: OTHER | Facility: OTHER | Age: 33
End: 2023-12-13
Payer: OTHER GOVERNMENT

## 2023-12-18 ENCOUNTER — PROCEDURE VISIT (OUTPATIENT)
Dept: MATERNAL FETAL MEDICINE | Facility: CLINIC | Age: 33
End: 2023-12-18
Payer: OTHER GOVERNMENT

## 2023-12-18 ENCOUNTER — ROUTINE PRENATAL (OUTPATIENT)
Dept: OBSTETRICS AND GYNECOLOGY | Facility: CLINIC | Age: 33
End: 2023-12-18
Payer: OTHER GOVERNMENT

## 2023-12-18 VITALS
SYSTOLIC BLOOD PRESSURE: 120 MMHG | DIASTOLIC BLOOD PRESSURE: 76 MMHG | BODY MASS INDEX: 33.07 KG/M2 | WEIGHT: 169.31 LBS

## 2023-12-18 DIAGNOSIS — O09.813 PREGNANCY RESULTING FROM IN VITRO FERTILIZATION IN THIRD TRIMESTER: Primary | ICD-10-CM

## 2023-12-18 DIAGNOSIS — Z3A.32 32 WEEKS GESTATION OF PREGNANCY: ICD-10-CM

## 2023-12-18 DIAGNOSIS — O09.93 SUPERVISION OF HIGH RISK PREGNANCY IN THIRD TRIMESTER: ICD-10-CM

## 2023-12-18 DIAGNOSIS — O44.40 LOW-LYING PLACENTA: ICD-10-CM

## 2023-12-18 DIAGNOSIS — R03.0 WHITE COAT SYNDROME WITH HIGH BLOOD PRESSURE BUT WITHOUT HYPERTENSION: ICD-10-CM

## 2023-12-18 PROCEDURE — 76816 US MFM PROCEDURE (VIEWPOINT): ICD-10-PCS | Mod: 26,S$PBB,, | Performed by: OBSTETRICS & GYNECOLOGY

## 2023-12-18 PROCEDURE — 99999 PR PBB SHADOW E&M-EST. PATIENT-LVL III: ICD-10-PCS | Mod: PBBFAC,,, | Performed by: OBSTETRICS & GYNECOLOGY

## 2023-12-18 PROCEDURE — 76817 US MFM PROCEDURE (VIEWPOINT): ICD-10-PCS | Mod: 26,S$PBB,, | Performed by: OBSTETRICS & GYNECOLOGY

## 2023-12-18 PROCEDURE — 99213 OFFICE O/P EST LOW 20 MIN: CPT | Mod: PBBFAC | Performed by: OBSTETRICS & GYNECOLOGY

## 2023-12-18 PROCEDURE — 0502F PR SUBSEQUENT PRENATAL CARE: ICD-10-PCS | Mod: ,,, | Performed by: OBSTETRICS & GYNECOLOGY

## 2023-12-18 PROCEDURE — 76817 TRANSVAGINAL US OBSTETRIC: CPT | Mod: 26,S$PBB,, | Performed by: OBSTETRICS & GYNECOLOGY

## 2023-12-18 PROCEDURE — 99999 PR PBB SHADOW E&M-EST. PATIENT-LVL III: CPT | Mod: PBBFAC,,, | Performed by: OBSTETRICS & GYNECOLOGY

## 2023-12-18 PROCEDURE — 76816 OB US FOLLOW-UP PER FETUS: CPT | Mod: PBBFAC | Performed by: OBSTETRICS & GYNECOLOGY

## 2023-12-18 PROCEDURE — 0502F SUBSEQUENT PRENATAL CARE: CPT | Mod: ,,, | Performed by: OBSTETRICS & GYNECOLOGY

## 2023-12-18 NOTE — PROGRESS NOTES
Good FM, no LOF, Ctx, VB. US today showed normal placenta and growth and vtx. RSV vaccine today. Wants IOL 2/4 in PM, ordered.     Given precautions.

## 2023-12-21 ENCOUNTER — PATIENT MESSAGE (OUTPATIENT)
Dept: OBSTETRICS AND GYNECOLOGY | Facility: CLINIC | Age: 33
End: 2023-12-21
Payer: OTHER GOVERNMENT

## 2024-01-02 ENCOUNTER — ROUTINE PRENATAL (OUTPATIENT)
Dept: OBSTETRICS AND GYNECOLOGY | Facility: CLINIC | Age: 34
End: 2024-01-02
Payer: OTHER GOVERNMENT

## 2024-01-02 VITALS
WEIGHT: 171.94 LBS | DIASTOLIC BLOOD PRESSURE: 86 MMHG | BODY MASS INDEX: 33.58 KG/M2 | SYSTOLIC BLOOD PRESSURE: 128 MMHG

## 2024-01-02 DIAGNOSIS — Z3A.34 34 WEEKS GESTATION OF PREGNANCY: Primary | ICD-10-CM

## 2024-01-02 PROCEDURE — 0502F SUBSEQUENT PRENATAL CARE: CPT | Mod: ,,,

## 2024-01-02 PROCEDURE — 99213 OFFICE O/P EST LOW 20 MIN: CPT | Mod: PBBFAC

## 2024-01-02 PROCEDURE — 99999 PR PBB SHADOW E&M-EST. PATIENT-LVL III: CPT | Mod: PBBFAC,,,

## 2024-01-02 NOTE — PATIENT INSTRUCTIONS
Call L & D after hours at [phone] for vaginal bleeding, leakage of fluids, regular contractions every 5 mins for 2 hours, decreased fetal movements ( 10 kicks in 2 hours), headache not relieved by Tylenol, blurry vision, or temp of 100.4 or greater.  Begin doing fetal kick counts, at least 10 movements in 2 hours starting at 28 weeks gestation.  Keep next clinic appointment.

## 2024-01-02 NOTE — PROGRESS NOTES
Here for routine OB appt at 34w6d. Reports good FM.  Denies LOF, denies VB. Did have one ctx- nothing regular. Discussed water intake, warm baths, tylenol. Discussed when to go to OB ED. Got RSV vaccine. Starts prenatal testing @ 36 weeks due to IVF. Interested in Indx- request placed.  Reviewed warning signs of Labor and Preeclampsia.  Daily FM counts reinforced.  F/U scheduled 2 weeks with me

## 2024-01-03 ENCOUNTER — PATIENT MESSAGE (OUTPATIENT)
Dept: OTHER | Facility: OTHER | Age: 34
End: 2024-01-03
Payer: OTHER GOVERNMENT

## 2024-01-05 ENCOUNTER — PATIENT MESSAGE (OUTPATIENT)
Dept: OBSTETRICS AND GYNECOLOGY | Facility: CLINIC | Age: 34
End: 2024-01-05
Payer: OTHER GOVERNMENT

## 2024-01-19 ENCOUNTER — HOSPITAL ENCOUNTER (OUTPATIENT)
Dept: PERINATAL CARE | Facility: OTHER | Age: 34
Discharge: HOME | End: 2024-01-19
Attending: OBSTETRICS & GYNECOLOGY
Payer: OTHER GOVERNMENT

## 2024-01-19 ENCOUNTER — ROUTINE PRENATAL (OUTPATIENT)
Dept: OBSTETRICS AND GYNECOLOGY | Facility: CLINIC | Age: 34
End: 2024-01-19
Payer: OTHER GOVERNMENT

## 2024-01-19 VITALS
SYSTOLIC BLOOD PRESSURE: 124 MMHG | BODY MASS INDEX: 33.58 KG/M2 | DIASTOLIC BLOOD PRESSURE: 80 MMHG | WEIGHT: 171.94 LBS

## 2024-01-19 DIAGNOSIS — Z34.83 ENCOUNTER FOR SUPERVISION OF OTHER NORMAL PREGNANCY IN THIRD TRIMESTER: Primary | ICD-10-CM

## 2024-01-19 DIAGNOSIS — O44.40 LOW-LYING PLACENTA: Primary | ICD-10-CM

## 2024-01-19 DIAGNOSIS — O09.813 PREGNANCY RESULTING FROM IN VITRO FERTILIZATION IN THIRD TRIMESTER: ICD-10-CM

## 2024-01-19 LAB
C TRACH DNA SPEC QL NAA+PROBE: NOT DETECTED
N GONORRHOEA DNA SPEC QL NAA+PROBE: NOT DETECTED

## 2024-01-19 PROCEDURE — 59025 FETAL NON-STRESS TEST: CPT

## 2024-01-19 PROCEDURE — 87081 CULTURE SCREEN ONLY: CPT | Performed by: OBSTETRICS & GYNECOLOGY

## 2024-01-19 PROCEDURE — 87491 CHLMYD TRACH DNA AMP PROBE: CPT | Performed by: OBSTETRICS & GYNECOLOGY

## 2024-01-19 PROCEDURE — 59025 FETAL NON-STRESS TEST: CPT | Mod: 26,,, | Performed by: OBSTETRICS & GYNECOLOGY

## 2024-01-19 PROCEDURE — 76815 OB US LIMITED FETUS(S): CPT

## 2024-01-19 PROCEDURE — 0502F SUBSEQUENT PRENATAL CARE: CPT | Mod: ,,, | Performed by: OBSTETRICS & GYNECOLOGY

## 2024-01-19 PROCEDURE — 99999 PR PBB SHADOW E&M-EST. PATIENT-LVL III: CPT | Mod: PBBFAC,,, | Performed by: OBSTETRICS & GYNECOLOGY

## 2024-01-19 PROCEDURE — 99213 OFFICE O/P EST LOW 20 MIN: CPT | Mod: PBBFAC,25 | Performed by: OBSTETRICS & GYNECOLOGY

## 2024-01-22 LAB — BACTERIA SPEC AEROBE CULT: NORMAL

## 2024-01-23 ENCOUNTER — PROCEDURE VISIT (OUTPATIENT)
Dept: OBSTETRICS AND GYNECOLOGY | Facility: CLINIC | Age: 34
End: 2024-01-23
Payer: OTHER GOVERNMENT

## 2024-01-23 VITALS — DIASTOLIC BLOOD PRESSURE: 83 MMHG | SYSTOLIC BLOOD PRESSURE: 132 MMHG

## 2024-01-23 DIAGNOSIS — O44.40 LOW-LYING PLACENTA: ICD-10-CM

## 2024-01-23 DIAGNOSIS — Z34.83 ENCOUNTER FOR SUPERVISION OF OTHER NORMAL PREGNANCY IN THIRD TRIMESTER: Primary | ICD-10-CM

## 2024-01-23 DIAGNOSIS — O09.813 PREGNANCY RESULTING FROM IN VITRO FERTILIZATION IN THIRD TRIMESTER: ICD-10-CM

## 2024-01-23 PROCEDURE — 0502F SUBSEQUENT PRENATAL CARE: CPT | Mod: ,,, | Performed by: OBSTETRICS & GYNECOLOGY

## 2024-01-23 PROCEDURE — 99212 OFFICE O/P EST SF 10 MIN: CPT | Mod: PBBFAC | Performed by: OBSTETRICS & GYNECOLOGY

## 2024-01-23 PROCEDURE — 76816 OB US FOLLOW-UP PER FETUS: CPT | Mod: PBBFAC | Performed by: OBSTETRICS & GYNECOLOGY

## 2024-01-23 PROCEDURE — 76819 FETAL BIOPHYS PROFIL W/O NST: CPT | Mod: 26,S$PBB,, | Performed by: OBSTETRICS & GYNECOLOGY

## 2024-01-23 PROCEDURE — 99999 PR PBB SHADOW E&M-EST. PATIENT-LVL II: CPT | Mod: PBBFAC,,, | Performed by: OBSTETRICS & GYNECOLOGY

## 2024-01-23 NOTE — PROGRESS NOTES
Good FM, no LOF, Ctx, VB. Membrane sweep done today. PNT today. IOL 2/4.     Given precautions.

## 2024-01-30 ENCOUNTER — PROCEDURE VISIT (OUTPATIENT)
Dept: OBSTETRICS AND GYNECOLOGY | Facility: CLINIC | Age: 34
End: 2024-01-30
Payer: OTHER GOVERNMENT

## 2024-01-30 VITALS
BODY MASS INDEX: 33.58 KG/M2 | WEIGHT: 171.94 LBS | DIASTOLIC BLOOD PRESSURE: 84 MMHG | SYSTOLIC BLOOD PRESSURE: 132 MMHG

## 2024-01-30 DIAGNOSIS — Z34.83 ENCOUNTER FOR SUPERVISION OF OTHER NORMAL PREGNANCY IN THIRD TRIMESTER: Primary | ICD-10-CM

## 2024-01-30 DIAGNOSIS — O09.813 PREGNANCY RESULTING FROM IN VITRO FERTILIZATION IN THIRD TRIMESTER: ICD-10-CM

## 2024-01-30 PROCEDURE — 76819 FETAL BIOPHYS PROFIL W/O NST: CPT | Mod: PBBFAC | Performed by: OBSTETRICS & GYNECOLOGY

## 2024-01-30 PROCEDURE — 0502F SUBSEQUENT PRENATAL CARE: CPT | Mod: ,,, | Performed by: OBSTETRICS & GYNECOLOGY

## 2024-01-30 PROCEDURE — 99999 PR PBB SHADOW E&M-EST. PATIENT-LVL III: CPT | Mod: PBBFAC,,, | Performed by: OBSTETRICS & GYNECOLOGY

## 2024-01-30 PROCEDURE — 99213 OFFICE O/P EST LOW 20 MIN: CPT | Mod: PBBFAC,25 | Performed by: OBSTETRICS & GYNECOLOGY

## 2024-01-30 NOTE — PROGRESS NOTES
Good FM, no LOF, Ctx, VB. IOL Sunday night. Membrane sweep done today.     Given precautions.

## 2024-02-01 ENCOUNTER — PATIENT MESSAGE (OUTPATIENT)
Dept: OBSTETRICS AND GYNECOLOGY | Facility: CLINIC | Age: 34
End: 2024-02-01
Payer: OTHER GOVERNMENT

## 2024-02-04 ENCOUNTER — HOSPITAL ENCOUNTER (INPATIENT)
Facility: OTHER | Age: 34
LOS: 3 days | Discharge: HOME | End: 2024-02-07
Attending: OBSTETRICS & GYNECOLOGY | Admitting: OBSTETRICS & GYNECOLOGY
Payer: OTHER GOVERNMENT

## 2024-02-04 ENCOUNTER — ANESTHESIA (OUTPATIENT)
Dept: OBSTETRICS AND GYNECOLOGY | Facility: OTHER | Age: 34
End: 2024-02-04
Payer: OTHER GOVERNMENT

## 2024-02-04 DIAGNOSIS — Z37.9 VACUUM-ASSISTED VAGINAL DELIVERY: Primary | ICD-10-CM

## 2024-02-04 DIAGNOSIS — O09.93 SUPERVISION OF HIGH RISK PREGNANCY IN THIRD TRIMESTER: ICD-10-CM

## 2024-02-04 DIAGNOSIS — Z34.90 ENCOUNTER FOR INDUCTION OF LABOR: ICD-10-CM

## 2024-02-04 LAB
ABO + RH BLD: NORMAL
ALBUMIN SERPL BCP-MCNC: 2.9 G/DL (ref 3.5–5.2)
ALP SERPL-CCNC: 129 U/L (ref 55–135)
ALT SERPL W/O P-5'-P-CCNC: 9 U/L (ref 10–44)
ANION GAP SERPL CALC-SCNC: 8 MMOL/L (ref 8–16)
AST SERPL-CCNC: 13 U/L (ref 10–40)
BASOPHILS # BLD AUTO: 0.02 K/UL (ref 0–0.2)
BASOPHILS NFR BLD: 0.3 % (ref 0–1.9)
BILIRUB SERPL-MCNC: 0.3 MG/DL (ref 0.1–1)
BLD GP AB SCN CELLS X3 SERPL QL: NORMAL
BUN SERPL-MCNC: 9 MG/DL (ref 6–20)
CALCIUM SERPL-MCNC: 9.1 MG/DL (ref 8.7–10.5)
CHLORIDE SERPL-SCNC: 108 MMOL/L (ref 95–110)
CO2 SERPL-SCNC: 20 MMOL/L (ref 23–29)
CREAT SERPL-MCNC: 0.7 MG/DL (ref 0.5–1.4)
DIFFERENTIAL METHOD BLD: ABNORMAL
EOSINOPHIL # BLD AUTO: 0 K/UL (ref 0–0.5)
EOSINOPHIL NFR BLD: 0.4 % (ref 0–8)
ERYTHROCYTE [DISTWIDTH] IN BLOOD BY AUTOMATED COUNT: 13 % (ref 11.5–14.5)
EST. GFR  (NO RACE VARIABLE): >60 ML/MIN/1.73 M^2
GLUCOSE SERPL-MCNC: 91 MG/DL (ref 70–110)
HBV SURFACE AG SERPL QL IA: NORMAL
HCT VFR BLD AUTO: 33.4 % (ref 37–48.5)
HGB BLD-MCNC: 10.6 G/DL (ref 12–16)
HIV 1+2 AB+HIV1 P24 AG SERPL QL IA: NEGATIVE
IMM GRANULOCYTES # BLD AUTO: 0.02 K/UL (ref 0–0.04)
IMM GRANULOCYTES NFR BLD AUTO: 0.3 % (ref 0–0.5)
LYMPHOCYTES # BLD AUTO: 1.9 K/UL (ref 1–4.8)
LYMPHOCYTES NFR BLD: 24.3 % (ref 18–48)
MCH RBC QN AUTO: 25.4 PG (ref 27–31)
MCHC RBC AUTO-ENTMCNC: 31.7 G/DL (ref 32–36)
MCV RBC AUTO: 80 FL (ref 82–98)
MONOCYTES # BLD AUTO: 0.5 K/UL (ref 0.3–1)
MONOCYTES NFR BLD: 5.9 % (ref 4–15)
NEUTROPHILS # BLD AUTO: 5.3 K/UL (ref 1.8–7.7)
NEUTROPHILS NFR BLD: 68.8 % (ref 38–73)
NRBC BLD-RTO: 0 /100 WBC
PLATELET # BLD AUTO: 284 K/UL (ref 150–450)
PMV BLD AUTO: 10.3 FL (ref 9.2–12.9)
POTASSIUM SERPL-SCNC: 4.4 MMOL/L (ref 3.5–5.1)
PROT SERPL-MCNC: 6 G/DL (ref 6–8.4)
RBC # BLD AUTO: 4.18 M/UL (ref 4–5.4)
SODIUM SERPL-SCNC: 136 MMOL/L (ref 136–145)
SPECIMEN OUTDATE: NORMAL
WBC # BLD AUTO: 7.74 K/UL (ref 3.9–12.7)

## 2024-02-04 PROCEDURE — 63600175 PHARM REV CODE 636 W HCPCS

## 2024-02-04 PROCEDURE — 25000003 PHARM REV CODE 250

## 2024-02-04 PROCEDURE — 87340 HEPATITIS B SURFACE AG IA: CPT | Performed by: OBSTETRICS & GYNECOLOGY

## 2024-02-04 PROCEDURE — 85025 COMPLETE CBC W/AUTO DIFF WBC: CPT | Performed by: OBSTETRICS & GYNECOLOGY

## 2024-02-04 PROCEDURE — 87389 HIV-1 AG W/HIV-1&-2 AB AG IA: CPT | Performed by: OBSTETRICS & GYNECOLOGY

## 2024-02-04 PROCEDURE — 86762 RUBELLA ANTIBODY: CPT | Performed by: OBSTETRICS & GYNECOLOGY

## 2024-02-04 PROCEDURE — 86850 RBC ANTIBODY SCREEN: CPT | Performed by: OBSTETRICS & GYNECOLOGY

## 2024-02-04 PROCEDURE — 80053 COMPREHEN METABOLIC PANEL: CPT

## 2024-02-04 PROCEDURE — C1751 CATH, INF, PER/CENT/MIDLINE: HCPCS | Performed by: ANESTHESIOLOGY

## 2024-02-04 PROCEDURE — 59400 OBSTETRICAL CARE: CPT | Mod: AA,,, | Performed by: ANESTHESIOLOGY

## 2024-02-04 PROCEDURE — 11000001 HC ACUTE MED/SURG PRIVATE ROOM

## 2024-02-04 PROCEDURE — 27200710 HC EPIDURAL INFUSION PUMP SET: Performed by: ANESTHESIOLOGY

## 2024-02-04 PROCEDURE — 86592 SYPHILIS TEST NON-TREP QUAL: CPT | Performed by: OBSTETRICS & GYNECOLOGY

## 2024-02-04 RX ORDER — OXYTOCIN/RINGER'S LACTATE 30/500 ML
334 PLASTIC BAG, INJECTION (ML) INTRAVENOUS ONCE
Status: COMPLETED | OUTPATIENT
Start: 2024-02-04 | End: 2024-02-05

## 2024-02-04 RX ORDER — BUPROPION HYDROCHLORIDE 150 MG/1
150 TABLET ORAL DAILY
Status: DISCONTINUED | OUTPATIENT
Start: 2024-02-05 | End: 2024-02-07 | Stop reason: HOSPADM

## 2024-02-04 RX ORDER — OXYTOCIN 10 [USP'U]/ML
10 INJECTION, SOLUTION INTRAMUSCULAR; INTRAVENOUS ONCE AS NEEDED
Status: DISCONTINUED | OUTPATIENT
Start: 2024-02-04 | End: 2024-02-07 | Stop reason: HOSPADM

## 2024-02-04 RX ORDER — TRANEXAMIC ACID 10 MG/ML
1000 INJECTION, SOLUTION INTRAVENOUS EVERY 30 MIN PRN
Status: DISCONTINUED | OUTPATIENT
Start: 2024-02-04 | End: 2024-02-07 | Stop reason: HOSPADM

## 2024-02-04 RX ORDER — PANTOPRAZOLE SODIUM 40 MG/1
40 TABLET, DELAYED RELEASE ORAL DAILY PRN
Status: DISCONTINUED | OUTPATIENT
Start: 2024-02-04 | End: 2024-02-07 | Stop reason: HOSPADM

## 2024-02-04 RX ORDER — FENTANYL/BUPIVACAINE/NS/PF 2MCG/ML-.1
PLASTIC BAG, INJECTION (ML) INJECTION
Status: COMPLETED
Start: 2024-02-04 | End: 2024-02-05

## 2024-02-04 RX ORDER — OXYTOCIN 10 [USP'U]/ML
10 INJECTION, SOLUTION INTRAMUSCULAR; INTRAVENOUS
Status: DISCONTINUED | OUTPATIENT
Start: 2024-02-04 | End: 2024-02-07 | Stop reason: HOSPADM

## 2024-02-04 RX ORDER — OXYTOCIN/RINGER'S LACTATE 30/500 ML
334 PLASTIC BAG, INJECTION (ML) INTRAVENOUS ONCE AS NEEDED
Status: DISCONTINUED | OUTPATIENT
Start: 2024-02-04 | End: 2024-02-07 | Stop reason: HOSPADM

## 2024-02-04 RX ORDER — DIPHENOXYLATE HYDROCHLORIDE AND ATROPINE SULFATE 2.5; .025 MG/1; MG/1
2 TABLET ORAL EVERY 6 HOURS PRN
Status: DISCONTINUED | OUTPATIENT
Start: 2024-02-04 | End: 2024-02-07 | Stop reason: HOSPADM

## 2024-02-04 RX ORDER — OXYTOCIN/RINGER'S LACTATE 30/500 ML
95 PLASTIC BAG, INJECTION (ML) INTRAVENOUS ONCE AS NEEDED
Status: DISCONTINUED | OUTPATIENT
Start: 2024-02-04 | End: 2024-02-07 | Stop reason: HOSPADM

## 2024-02-04 RX ORDER — SODIUM CHLORIDE, SODIUM LACTATE, POTASSIUM CHLORIDE, CALCIUM CHLORIDE 600; 310; 30; 20 MG/100ML; MG/100ML; MG/100ML; MG/100ML
INJECTION, SOLUTION INTRAVENOUS CONTINUOUS
Status: DISCONTINUED | OUTPATIENT
Start: 2024-02-04 | End: 2024-02-07 | Stop reason: HOSPADM

## 2024-02-04 RX ORDER — ACETAMINOPHEN 325 MG/1
650 TABLET ORAL EVERY 6 HOURS PRN
Status: DISCONTINUED | OUTPATIENT
Start: 2024-02-04 | End: 2024-02-07 | Stop reason: HOSPADM

## 2024-02-04 RX ORDER — SODIUM CHLORIDE 9 MG/ML
INJECTION, SOLUTION INTRAVENOUS
Status: DISCONTINUED | OUTPATIENT
Start: 2024-02-04 | End: 2024-02-07 | Stop reason: HOSPADM

## 2024-02-04 RX ORDER — CALCIUM CARBONATE 200(500)MG
500 TABLET,CHEWABLE ORAL 3 TIMES DAILY PRN
Status: DISCONTINUED | OUTPATIENT
Start: 2024-02-04 | End: 2024-02-07 | Stop reason: HOSPADM

## 2024-02-04 RX ORDER — PANTOPRAZOLE SODIUM 20 MG/1
20 TABLET, DELAYED RELEASE ORAL DAILY
Status: DISCONTINUED | OUTPATIENT
Start: 2024-02-05 | End: 2024-02-04

## 2024-02-04 RX ORDER — OXYTOCIN/RINGER'S LACTATE 30/500 ML
95 PLASTIC BAG, INJECTION (ML) INTRAVENOUS ONCE
Status: DISCONTINUED | OUTPATIENT
Start: 2024-02-04 | End: 2024-02-07 | Stop reason: HOSPADM

## 2024-02-04 RX ORDER — CARBOPROST TROMETHAMINE 250 UG/ML
250 INJECTION, SOLUTION INTRAMUSCULAR
Status: DISCONTINUED | OUTPATIENT
Start: 2024-02-04 | End: 2024-02-07 | Stop reason: HOSPADM

## 2024-02-04 RX ORDER — MISOPROSTOL 200 UG/1
800 TABLET ORAL ONCE AS NEEDED
Status: COMPLETED | OUTPATIENT
Start: 2024-02-04 | End: 2024-02-05

## 2024-02-04 RX ORDER — MISOPROSTOL 200 UG/1
800 TABLET ORAL ONCE AS NEEDED
Status: DISCONTINUED | OUTPATIENT
Start: 2024-02-04 | End: 2024-02-07 | Stop reason: HOSPADM

## 2024-02-04 RX ORDER — PROCHLORPERAZINE EDISYLATE 5 MG/ML
5 INJECTION INTRAMUSCULAR; INTRAVENOUS EVERY 6 HOURS PRN
Status: DISCONTINUED | OUTPATIENT
Start: 2024-02-04 | End: 2024-02-07 | Stop reason: HOSPADM

## 2024-02-04 RX ORDER — SIMETHICONE 80 MG
1 TABLET,CHEWABLE ORAL 4 TIMES DAILY PRN
Status: DISCONTINUED | OUTPATIENT
Start: 2024-02-04 | End: 2024-02-07 | Stop reason: HOSPADM

## 2024-02-04 RX ORDER — PANTOPRAZOLE SODIUM 40 MG/1
40 TABLET, DELAYED RELEASE ORAL DAILY
Status: DISCONTINUED | OUTPATIENT
Start: 2024-02-05 | End: 2024-02-04

## 2024-02-04 RX ORDER — METHYLERGONOVINE MALEATE 0.2 MG/ML
200 INJECTION INTRAVENOUS ONCE AS NEEDED
Status: DISCONTINUED | OUTPATIENT
Start: 2024-02-04 | End: 2024-02-07 | Stop reason: HOSPADM

## 2024-02-04 RX ORDER — OXYTOCIN/RINGER'S LACTATE 30/500 ML
0-32 PLASTIC BAG, INJECTION (ML) INTRAVENOUS CONTINUOUS
Status: DISCONTINUED | OUTPATIENT
Start: 2024-02-05 | End: 2024-02-06

## 2024-02-04 RX ORDER — LIDOCAINE HYDROCHLORIDE 10 MG/ML
10 INJECTION INFILTRATION; PERINEURAL ONCE AS NEEDED
Status: DISCONTINUED | OUTPATIENT
Start: 2024-02-04 | End: 2024-02-07 | Stop reason: HOSPADM

## 2024-02-04 RX ORDER — ONDANSETRON 8 MG/1
8 TABLET, ORALLY DISINTEGRATING ORAL EVERY 8 HOURS PRN
Status: DISCONTINUED | OUTPATIENT
Start: 2024-02-04 | End: 2024-02-07 | Stop reason: HOSPADM

## 2024-02-04 RX ADMIN — LIDOCAINE HYDROCHLORIDE,EPINEPHRINE BITARTRATE 3 ML: 15; .005 INJECTION, SOLUTION EPIDURAL; INFILTRATION; INTRACAUDAL; PERINEURAL at 11:02

## 2024-02-04 RX ADMIN — SODIUM CHLORIDE, POTASSIUM CHLORIDE, SODIUM LACTATE AND CALCIUM CHLORIDE: 600; 310; 30; 20 INJECTION, SOLUTION INTRAVENOUS at 06:02

## 2024-02-04 RX ADMIN — MISOPROSTOL 50 MCG: 100 TABLET ORAL at 07:02

## 2024-02-04 NOTE — ANESTHESIA PREPROCEDURE EVALUATION
[facility]  Anesthesia Pre-Operative Evaluation         Patient Name: Isabelle Anderson  YOB: 1990  MRN: [id number]    2024      Isabelle Anderson is a 33 y.o. female  @ 39w4d who presents for IOL. Anxiety.  Patient denies cardiopulmonary disease, bleeding disorders, or spine pathology.     OB History    Para Term  AB Living   3 0     2 0   SAB IAB Ectopic Multiple Live Births   2       0      # Outcome Date GA Lbr Pankaj/2nd Weight Sex Delivery Anes PTL Lv   3 Current            2 SAB            1 SAB               Obstetric Comments   Gynhx: 10/reg.   OCP -    Denies std,    Denies abnl, Pap 2016 NEG   S/p gardasil       Review of patient's allergies indicates:   Allergen Reactions    Lamictal [lamotrigine] Rash    Codeine Nausea And Vomiting     Can take other narcotics with nausea meds    Latex, natural rubber Rash       Wt Readings from Last 1 Encounters:   24 1121 78 kg (171 lb 15.3 oz)       BP Readings from Last 3 Encounters:   24 132/84   24 132/83   24 124/80       Patient Active Problem List   Diagnosis    Missed     S/P D&C (status post dilation and curettage)    LEBRON (generalized anxiety disorder)    Amenorrhea       Past Surgical History:   Procedure Laterality Date    DILATION AND CURETTAGE OF UTERUS USING SUCTION N/A 05/10/2021    Procedure: DILATION AND CURETTAGE, UTERUS, USING SUCTION;  Surgeon: Julie R. Jeansonne, MD;  Location: [facility];  Service: OB/GYN;  Laterality: N/A;    EYE SURGERY  2018    IVF Egg Retrieval  2023       Social History     Socioeconomic History    Marital status:    Tobacco Use    Smoking status: Never    Smokeless tobacco: Never   Substance and Sexual Activity    Alcohol use: No    Drug use: No    Sexual activity: Yes     Partners: Male     Social Determinants of Health     Financial Resource Strain: Low Risk  (2023)    Overall Financial Resource Strain  (CARDIA)     Difficulty of Paying Living Expenses: Not hard at all   Food Insecurity: No Food Insecurity (11/29/2023)    Hunger Vital Sign     Worried About Running Out of Food in the Last Year: Never true     Ran Out of Food in the Last Year: Never true   Transportation Needs: No Transportation Needs (11/29/2023)    PRAPARE - Transportation     Lack of Transportation (Medical): No     Lack of Transportation (Non-Medical): No   Physical Activity: Inactive (11/29/2023)    Exercise Vital Sign     Days of Exercise per Week: 0 days     Minutes of Exercise per Session: 0 min   Stress: No Stress Concern Present (11/29/2023)    [employer] - Occupational Stress Questionnaire     Feeling of Stress : Only a little   Social Connections: Unknown (11/29/2023)    Social Connection and Isolation Panel [NHANES]     Frequency of Communication with Friends and Family: More than three times a week     Frequency of Social Gatherings with Friends and Family: Twice a week     Active Member of Clubs or Organizations: No     Attends Club or Organization Meetings: Never     Marital Status:    Housing Stability: Low Risk  (11/29/2023)    Housing Stability Vital Sign     Unable to Pay for Housing in the Last Year: No     Number of Places Lived in the Last Year: 1     Unstable Housing in the Last Year: No         Chemistry        Component Value Date/Time     11/06/2023 0958    K 4.0 11/06/2023 0958     11/06/2023 0958    CO2 20 (L) 11/06/2023 0958    BUN 9 11/06/2023 0958    CREATININE 0.7 11/06/2023 0958     (H) 11/06/2023 0958        Component Value Date/Time    CALCIUM 9.0 11/06/2023 0958    ALKPHOS 52 (L) 11/06/2023 0958    AST 13 11/06/2023 0958    ALT 10 11/06/2023 0958    BILITOT 0.3 11/06/2023 0958    ESTGFRAFRICA >60 07/25/2022 0757    EGFRNONAA >60 07/25/2022 0757            Lab Results   Component Value Date    WBC 6.90 11/06/2023    HGB 11.9 (L) 11/06/2023    HCT 36.0 (L)  "11/06/2023    MCV 88 11/06/2023     11/06/2023       No results for input(s): "PT", "INR", "PROTIME", "APTT" in the last 72 hours.            Pre-op Assessment    I have reviewed the Patient Summary Reports.     I have reviewed the Nursing Notes.    I have reviewed the Medications.     Review of Systems  Anesthesia Hx:  No problems with previous Anesthesia   History of prior surgery of interest to airway management or planning:          Denies Family Hx of Anesthesia complications.    Denies Personal Hx of Anesthesia complications.                    Social:  Non-Smoker, No Alcohol Use       Hematology/Oncology:       -- Denies Anemia:                  Denies Current/Recent Cancer                Cardiovascular:      Denies Hypertension.    Denies CAD.     Denies Dysrhythmias.    Denies CHF.    no hyperlipidemia                             Pulmonary:    Denies COPD.  Denies Asthma.     Denies Sleep Apnea.                Renal/:   Denies Chronic Renal Disease.                Hepatic/GI:      Denies GERD. Denies Liver Disease.            Musculoskeletal:  Denies Arthritis.               Neurological:    Denies CVA. Denies Neuromuscular Disease.   Denies Seizures.          Denies Chronic Pain Syndrome                         Endocrine:  Denies Diabetes.   Denies Hyperthyroidism.       Denies Obesity / BMI > 30  Psych:  Psychiatric History denies anxiety denies depression                Physical Exam  General: Well nourished, Cooperative, Alert and Oriented    Airway:  Mallampati: II   Mouth Opening: Normal  TM Distance: Normal  Tongue: Normal  Neck ROM: Normal ROM    Dental:  Intact        Anesthesia Plan  Type of Anesthesia, risks & benefits discussed:    Anesthesia Type: Epidural, Spinal, CSE  Intra-op Monitoring Plan: Standard ASA Monitors  Post Op Pain Control Plan: multimodal analgesia, epidural analgesia, intrathecal opioid and IV/PO Opioids PRN  Induction:  IV and rapid sequence  Airway Plan: Video, " Post-Induction  Informed Consent: Informed consent signed with the Patient and all parties understand the risks and agree with anesthesia plan.  All questions answered.   ASA Score: 2  Day of Surgery Review of History & Physical: H&P Update referred to the surgeon/provider.    Ready For Surgery From Anesthesia Perspective.     .

## 2024-02-05 PROBLEM — Z34.90 ENCOUNTER FOR INDUCTION OF LABOR: Status: RESOLVED | Noted: 2024-02-04 | Resolved: 2024-02-05

## 2024-02-05 PROBLEM — Z37.9 VACUUM-ASSISTED VAGINAL DELIVERY: Status: ACTIVE | Noted: 2024-02-05

## 2024-02-05 LAB
ALLENS TEST: ABNORMAL
ALLENS TEST: ABNORMAL
CREAT UR-MCNC: 119.9 MG/DL (ref 15–325)
HCO3 UR-SCNC: 15.9 MMOL/L (ref 12–29)
HCO3 UR-SCNC: 17 MMOL/L (ref 17–27)
HCT VFR BLD CALC: 50 %PCV (ref 36–54)
HCT VFR BLD CALC: 53 %PCV (ref 36–54)
HGB BLD-MCNC: 17 G/DL
HGB BLD-MCNC: 18 G/DL
PCO2 BLDA: 37.9 MMHG (ref 27–49)
PCO2 BLDA: 41.5 MMHG (ref 32–66)
PH SMN: 7.22 [PH] (ref 7.18–7.38)
PH SMN: 7.23 [PH] (ref 7.25–7.45)
PO2 BLDA: 14 MMHG (ref 6–31)
PO2 BLDA: 17 MMHG (ref 17–41)
POC BE: -11 MMOL/L
POC BE: -12 MMOL/L
POC SATURATED O2: 12 %
POC SATURATED O2: 18 %
POC TCO2: 17 MMOL/L (ref 23–27)
POC TCO2: 18 MMOL/L (ref 23–27)
PROT UR-MCNC: 12 MG/DL (ref 0–15)
PROT/CREAT UR: 0.1 MG/G{CREAT} (ref 0–0.2)
RPR SER QL: NORMAL
RUBV IGG SER-ACNC: 46.2 IU/ML
RUBV IGG SER-IMP: REACTIVE
SAMPLE: ABNORMAL
SAMPLE: ABNORMAL
SITE: ABNORMAL
SITE: ABNORMAL

## 2024-02-05 PROCEDURE — 0KQM0ZZ REPAIR PERINEUM MUSCLE, OPEN APPROACH: ICD-10-PCS | Performed by: OBSTETRICS & GYNECOLOGY

## 2024-02-05 PROCEDURE — 63600175 PHARM REV CODE 636 W HCPCS

## 2024-02-05 PROCEDURE — 25000003 PHARM REV CODE 250

## 2024-02-05 PROCEDURE — 51701 INSERT BLADDER CATHETER: CPT

## 2024-02-05 PROCEDURE — 51702 INSERT TEMP BLADDER CATH: CPT

## 2024-02-05 PROCEDURE — 72100002 HC LABOR CARE, 1ST 8 HOURS

## 2024-02-05 PROCEDURE — 72100003 HC LABOR CARE, EA. ADDL. 8 HRS

## 2024-02-05 PROCEDURE — 62326 NJX INTERLAMINAR LMBR/SAC: CPT

## 2024-02-05 PROCEDURE — 59400 OBSTETRICAL CARE: CPT | Mod: ,,, | Performed by: OBSTETRICS & GYNECOLOGY

## 2024-02-05 PROCEDURE — 72200006 HC VAGINAL DELIVERY LEVEL III

## 2024-02-05 PROCEDURE — 11000001 HC ACUTE MED/SURG PRIVATE ROOM

## 2024-02-05 PROCEDURE — 99900035 HC TECH TIME PER 15 MIN (STAT)

## 2024-02-05 PROCEDURE — 36416 COLLJ CAPILLARY BLOOD SPEC: CPT

## 2024-02-05 PROCEDURE — 82570 ASSAY OF URINE CREATININE: CPT

## 2024-02-05 PROCEDURE — 10907ZC DRAINAGE OF AMNIOTIC FLUID, THERAPEUTIC FROM PRODUCTS OF CONCEPTION, VIA NATURAL OR ARTIFICIAL OPENING: ICD-10-PCS | Performed by: OBSTETRICS & GYNECOLOGY

## 2024-02-05 RX ORDER — ONDANSETRON 8 MG/1
8 TABLET, ORALLY DISINTEGRATING ORAL EVERY 8 HOURS PRN
Status: CANCELLED | OUTPATIENT
Start: 2024-02-05

## 2024-02-05 RX ORDER — OXYTOCIN/RINGER'S LACTATE 30/500 ML
95 PLASTIC BAG, INJECTION (ML) INTRAVENOUS ONCE AS NEEDED
Status: CANCELLED | OUTPATIENT
Start: 2024-02-05 | End: 2035-07-04

## 2024-02-05 RX ORDER — FERROUS SULFATE 325(65) MG
325 TABLET ORAL
Qty: 30 TABLET | Refills: 0 | Status: SHIPPED | OUTPATIENT
Start: 2024-02-05 | End: 2024-06-01

## 2024-02-05 RX ORDER — METHYLERGONOVINE MALEATE 0.2 MG/ML
200 INJECTION INTRAVENOUS ONCE AS NEEDED
Status: CANCELLED | OUTPATIENT
Start: 2024-02-05 | End: 2035-07-04

## 2024-02-05 RX ORDER — MISOPROSTOL 200 UG/1
800 TABLET ORAL ONCE AS NEEDED
Status: CANCELLED | OUTPATIENT
Start: 2024-02-05

## 2024-02-05 RX ORDER — TRANEXAMIC ACID 10 MG/ML
1000 INJECTION, SOLUTION INTRAVENOUS EVERY 30 MIN PRN
Status: CANCELLED | OUTPATIENT
Start: 2024-02-05

## 2024-02-05 RX ORDER — DIPHENHYDRAMINE HCL 25 MG
25 CAPSULE ORAL EVERY 4 HOURS PRN
Status: DISCONTINUED | OUTPATIENT
Start: 2024-02-05 | End: 2024-02-07 | Stop reason: HOSPADM

## 2024-02-05 RX ORDER — DOCUSATE SODIUM 100 MG/1
200 CAPSULE, LIQUID FILLED ORAL 2 TIMES DAILY PRN
Status: DISCONTINUED | OUTPATIENT
Start: 2024-02-05 | End: 2024-02-06

## 2024-02-05 RX ORDER — CARBOPROST TROMETHAMINE 250 UG/ML
250 INJECTION, SOLUTION INTRAMUSCULAR
Status: CANCELLED | OUTPATIENT
Start: 2024-02-05

## 2024-02-05 RX ORDER — ACETAMINOPHEN 500 MG
1000 TABLET ORAL ONCE
Status: COMPLETED | OUTPATIENT
Start: 2024-02-05 | End: 2024-02-05

## 2024-02-05 RX ORDER — HYDROCODONE BITARTRATE AND ACETAMINOPHEN 10; 325 MG/1; MG/1
1 TABLET ORAL EVERY 4 HOURS PRN
Status: DISCONTINUED | OUTPATIENT
Start: 2024-02-05 | End: 2024-02-07 | Stop reason: HOSPADM

## 2024-02-05 RX ORDER — IBUPROFEN 600 MG/1
600 TABLET ORAL EVERY 6 HOURS
Status: DISCONTINUED | OUTPATIENT
Start: 2024-02-05 | End: 2024-02-07 | Stop reason: HOSPADM

## 2024-02-05 RX ORDER — HYDROCODONE BITARTRATE AND ACETAMINOPHEN 5; 325 MG/1; MG/1
1 TABLET ORAL EVERY 4 HOURS PRN
Status: DISCONTINUED | OUTPATIENT
Start: 2024-02-05 | End: 2024-02-07 | Stop reason: HOSPADM

## 2024-02-05 RX ORDER — HYDROCODONE BITARTRATE AND ACETAMINOPHEN 5; 325 MG/1; MG/1
1 TABLET ORAL EVERY 4 HOURS PRN
Qty: 10 TABLET | Refills: 0 | Status: SHIPPED | OUTPATIENT
Start: 2024-02-05 | End: 2024-02-23

## 2024-02-05 RX ORDER — FENTANYL/BUPIVACAINE/NS/PF 2MCG/ML-.1
PLASTIC BAG, INJECTION (ML) INJECTION CONTINUOUS PRN
Status: DISCONTINUED | OUTPATIENT
Start: 2024-02-05 | End: 2024-02-05

## 2024-02-05 RX ORDER — DIPHENOXYLATE HYDROCHLORIDE AND ATROPINE SULFATE 2.5; .025 MG/1; MG/1
2 TABLET ORAL EVERY 6 HOURS PRN
Status: CANCELLED | OUTPATIENT
Start: 2024-02-05

## 2024-02-05 RX ORDER — DOCUSATE SODIUM 100 MG/1
100 CAPSULE, LIQUID FILLED ORAL 2 TIMES DAILY
Qty: 30 CAPSULE | Refills: 0 | Status: SHIPPED | OUTPATIENT
Start: 2024-02-05 | End: 2024-06-01

## 2024-02-05 RX ORDER — DIPHENHYDRAMINE HYDROCHLORIDE 50 MG/ML
25 INJECTION INTRAMUSCULAR; INTRAVENOUS ONCE
Status: COMPLETED | OUTPATIENT
Start: 2024-02-05 | End: 2024-02-05

## 2024-02-05 RX ORDER — DIPHENHYDRAMINE HYDROCHLORIDE 50 MG/ML
25 INJECTION INTRAMUSCULAR; INTRAVENOUS EVERY 4 HOURS PRN
Status: DISCONTINUED | OUTPATIENT
Start: 2024-02-05 | End: 2024-02-07 | Stop reason: HOSPADM

## 2024-02-05 RX ORDER — OXYTOCIN 10 [USP'U]/ML
10 INJECTION, SOLUTION INTRAMUSCULAR; INTRAVENOUS ONCE AS NEEDED
Status: CANCELLED | OUTPATIENT
Start: 2024-02-05 | End: 2035-07-04

## 2024-02-05 RX ORDER — ACETAMINOPHEN 325 MG/1
650 TABLET ORAL EVERY 6 HOURS SCHEDULED
Status: DISCONTINUED | OUTPATIENT
Start: 2024-02-05 | End: 2024-02-07 | Stop reason: HOSPADM

## 2024-02-05 RX ORDER — IBUPROFEN 600 MG/1
600 TABLET ORAL EVERY 6 HOURS PRN
Qty: 30 TABLET | Refills: 0 | Status: SHIPPED | OUTPATIENT
Start: 2024-02-05 | End: 2024-02-23

## 2024-02-05 RX ORDER — HYDROCORTISONE 25 MG/G
CREAM TOPICAL 3 TIMES DAILY PRN
Status: DISCONTINUED | OUTPATIENT
Start: 2024-02-05 | End: 2024-02-07 | Stop reason: HOSPADM

## 2024-02-05 RX ORDER — PROCHLORPERAZINE EDISYLATE 5 MG/ML
5 INJECTION INTRAMUSCULAR; INTRAVENOUS EVERY 6 HOURS PRN
Status: CANCELLED | OUTPATIENT
Start: 2024-02-05

## 2024-02-05 RX ORDER — OXYTOCIN 10 [USP'U]/ML
INJECTION, SOLUTION INTRAMUSCULAR; INTRAVENOUS
Status: DISCONTINUED
Start: 2024-02-05 | End: 2024-02-05 | Stop reason: WASHOUT

## 2024-02-05 RX ORDER — LIDOCAINE HYDROCHLORIDE AND EPINEPHRINE 15; 5 MG/ML; UG/ML
INJECTION, SOLUTION EPIDURAL
Status: DISCONTINUED | OUTPATIENT
Start: 2024-02-04 | End: 2024-02-05

## 2024-02-05 RX ORDER — MISOPROSTOL 200 UG/1
800 TABLET ORAL ONCE AS NEEDED
Status: CANCELLED | OUTPATIENT
Start: 2024-02-05 | End: 2035-07-04

## 2024-02-05 RX ORDER — ACETAMINOPHEN 500 MG
1000 TABLET ORAL ONCE
Status: DISCONTINUED | OUTPATIENT
Start: 2024-02-05 | End: 2024-02-05

## 2024-02-05 RX ORDER — SIMETHICONE 80 MG
1 TABLET,CHEWABLE ORAL EVERY 6 HOURS PRN
Status: DISCONTINUED | OUTPATIENT
Start: 2024-02-05 | End: 2024-02-07 | Stop reason: HOSPADM

## 2024-02-05 RX ORDER — OXYTOCIN/RINGER'S LACTATE 30/500 ML
95 PLASTIC BAG, INJECTION (ML) INTRAVENOUS ONCE
Status: DISCONTINUED | OUTPATIENT
Start: 2024-02-05 | End: 2024-02-07 | Stop reason: HOSPADM

## 2024-02-05 RX ORDER — PRENATAL WITH FERROUS FUM AND FOLIC ACID 3080; 920; 120; 400; 22; 1.84; 3; 20; 10; 1; 12; 200; 27; 25; 2 [IU]/1; [IU]/1; MG/1; [IU]/1; MG/1; MG/1; MG/1; MG/1; MG/1; MG/1; UG/1; MG/1; MG/1; MG/1; MG/1
1 TABLET ORAL DAILY
Status: DISCONTINUED | OUTPATIENT
Start: 2024-02-06 | End: 2024-02-07 | Stop reason: HOSPADM

## 2024-02-05 RX ORDER — MISOPROSTOL 200 UG/1
TABLET ORAL
Status: DISPENSED
Start: 2024-02-05 | End: 2024-02-05

## 2024-02-05 RX ORDER — OXYTOCIN/RINGER'S LACTATE 30/500 ML
334 PLASTIC BAG, INJECTION (ML) INTRAVENOUS ONCE AS NEEDED
Status: CANCELLED | OUTPATIENT
Start: 2024-02-05 | End: 2035-07-04

## 2024-02-05 RX ORDER — SODIUM CHLORIDE 0.9 % (FLUSH) 0.9 %
10 SYRINGE (ML) INJECTION
Status: CANCELLED | OUTPATIENT
Start: 2024-02-05

## 2024-02-05 RX ADMIN — BUPROPION HYDROCHLORIDE 150 MG: 150 TABLET, EXTENDED RELEASE ORAL at 08:02

## 2024-02-05 RX ADMIN — PROCHLORPERAZINE EDISYLATE 5 MG: 5 INJECTION INTRAMUSCULAR; INTRAVENOUS at 10:02

## 2024-02-05 RX ADMIN — GENTAMICIN SULFATE 292.4 MG: 40 INJECTION, SOLUTION INTRAMUSCULAR; INTRAVENOUS at 02:02

## 2024-02-05 RX ADMIN — MISOPROSTOL 800 MCG: 200 TABLET ORAL at 03:02

## 2024-02-05 RX ADMIN — ACETAMINOPHEN 650 MG: 325 TABLET, FILM COATED ORAL at 08:02

## 2024-02-05 RX ADMIN — SODIUM CHLORIDE, POTASSIUM CHLORIDE, SODIUM LACTATE AND CALCIUM CHLORIDE: 600; 310; 30; 20 INJECTION, SOLUTION INTRAVENOUS at 02:02

## 2024-02-05 RX ADMIN — DIPHENHYDRAMINE HYDROCHLORIDE 25 MG: 50 INJECTION, SOLUTION INTRAMUSCULAR; INTRAVENOUS at 12:02

## 2024-02-05 RX ADMIN — ACETAMINOPHEN 650 MG: 325 TABLET, FILM COATED ORAL at 07:02

## 2024-02-05 RX ADMIN — IBUPROFEN 600 MG: 600 TABLET, FILM COATED ORAL at 11:02

## 2024-02-05 RX ADMIN — CEFTRIAXONE SODIUM 1 G: 1 INJECTION, POWDER, FOR SOLUTION INTRAMUSCULAR; INTRAVENOUS at 03:02

## 2024-02-05 RX ADMIN — Medication 8 ML/HR: at 12:02

## 2024-02-05 RX ADMIN — ONDANSETRON 8 MG: 8 TABLET, ORALLY DISINTEGRATING ORAL at 01:02

## 2024-02-05 RX ADMIN — AMPICILLIN SODIUM 2 G: 2 INJECTION, POWDER, FOR SOLUTION INTRAMUSCULAR; INTRAVENOUS at 01:02

## 2024-02-05 RX ADMIN — Medication 4 MILLI-UNITS/MIN: at 12:02

## 2024-02-05 RX ADMIN — Medication 334 MILLI-UNITS/MIN: at 03:02

## 2024-02-05 RX ADMIN — ACETAMINOPHEN 1000 MG: 500 TABLET ORAL at 01:02

## 2024-02-05 RX ADMIN — AMPICILLIN SODIUM 2 G: 2 INJECTION, POWDER, FOR SOLUTION INTRAMUSCULAR; INTRAVENOUS at 07:02

## 2024-02-05 RX ADMIN — IBUPROFEN 600 MG: 600 TABLET, FILM COATED ORAL at 05:02

## 2024-02-05 NOTE — PROGRESS NOTES
LABOR NOTE    S:  Complaints: No.  Epidural working:  yes  Resident to bedside for cervical exam    O: /79   Pulse 100   Temp 98.7 °F (37.1 °C) (Oral)   Resp 18   Wt 78 kg (171 lb 15.3 oz)   LMP 2023   SpO2 100%   Breastfeeding No   BMI 33.58 kg/m²     FHT: 170, mod  BTBV, + accels, -decels Cat 2 (reassuring)  CTX: q3, pit @12    TIMELINE:  1830: /-3, cytotec x1  2240: 1.570/-3, tran placed  0230: /-2, AROM clr, pit @8   0500: /-1, pit @12   0830: 1  1015: 0  1145: 0, IUPC placed   1300: 9./0  1345: 10/100/0      PLAN:  Continue Close Maternal/Fetal Monitoring  Pitocin Augmentation per protocol  Primary OB notified   Anticipate     Chorio   - Maternal temp 100.9  - Fetal Tachycardia to 170s  - Amp/Gent ordered   - Tylenol ordered    gHTN  BP: ()/(52-97) 128/79  -BP stable   -PreE labs wnl  Recent Labs   Lab 24  1738 24  2311 24  0129   WBC 7.74  --   --    HGB 10.6*  --   --    HCT 33.4*  --   --      --   --    BUN  --  9  --    CREATININE  --  0.7  --    ALT  --  9*  --    AST  --  13  --    UTPCR  --   --  0.10        Yaneth Sol MD PGY-2  Obstetrics and Gynecology  [facility]

## 2024-02-05 NOTE — H&P
HISTORY AND PHYSICAL                                                OBSTETRICS          Subjective:       Isabelle Anderson is a 33 y.o.  female with IUP at 39w4d weeks gestation who presents for elective IOL.    Patient denies contractions, denies vaginal bleeding, denies LOF.   Fetal Movement: normal.    This IUP is complicated by anxiety, anemia, IVF pregnancy.    Review of Systems   Constitutional:  Negative for fever.   Respiratory:  Negative for shortness of breath.    Cardiovascular:  Negative for chest pain.   Gastrointestinal:  Negative for abdominal pain, nausea and vomiting.   Endocrine: Negative for hot flashes.   Genitourinary:  Negative for vaginal bleeding.   Integumentary:  Negative for breast mass, nipple discharge and breast skin changes.   Neurological:  Negative for headaches.   Hematological:  Does not bruise/bleed easily.   Psychiatric/Behavioral:  Negative for depression.    Breast: Negative for mass, mastodynia, nipple discharge and skin changes      PMHx:   Past Medical History:   Diagnosis Date    Infertility, female     PONV (postoperative nausea and vomiting)        PSHx:   Past Surgical History:   Procedure Laterality Date    DILATION AND CURETTAGE OF UTERUS USING SUCTION N/A 05/10/2021    Procedure: DILATION AND CURETTAGE, UTERUS, USING SUCTION;  Surgeon: Julie R. Jeansonne, MD;  Location: [facility];  Service: OB/GYN;  Laterality: N/A;    EYE SURGERY  2018    IVF Egg Retrieval  2023       All:   Review of patient's allergies indicates:   Allergen Reactions    Lamictal [lamotrigine] Rash    Codeine Nausea And Vomiting     Can take other narcotics with nausea meds    Latex, natural rubber Rash       Meds:   Medications Prior to Admission   Medication Sig Dispense Refill Last Dose    aspirin 81 MG Chew        buPROPion (WELLBUTRIN XL) 150 MG TB24 tablet Take 1 tablet (150 mg total) by mouth once daily. 90 tablet 4     ergocalciferol, vitamin D2, (VITAMIN D ORAL)         pantoprazole (PROTONIX) 20 MG tablet Take 1 tablet (20 mg total) by mouth once daily. 30 tablet 1     prenatal 25-iron-folate 6-dha 30 mg iron-1mg -200 mg Cap Take by mouth.          SH:   Social History     Socioeconomic History    Marital status:    Tobacco Use    Smoking status: Never    Smokeless tobacco: Never   Substance and Sexual Activity    Alcohol use: No    Drug use: No    Sexual activity: Yes     Partners: Male     Social Determinants of Health     Financial Resource Strain: Low Risk  (2/4/2024)    Overall Financial Resource Strain (CARDIA)     Difficulty of Paying Living Expenses: Not hard at all   Food Insecurity: No Food Insecurity (2/4/2024)    Hunger Vital Sign     Worried About Running Out of Food in the Last Year: Never true     Ran Out of Food in the Last Year: Never true   Transportation Needs: No Transportation Needs (2/4/2024)    PRAPARE - Transportation     Lack of Transportation (Medical): No     Lack of Transportation (Non-Medical): No   Physical Activity: Inactive (11/29/2023)    Exercise Vital Sign     Days of Exercise per Week: 0 days     Minutes of Exercise per Session: 0 min   Stress: No Stress Concern Present (2/4/2024)    [employer] - Occupational Stress Questionnaire     Feeling of Stress : Only a little   Social Connections: Unknown (11/29/2023)    Social Connection and Isolation Panel [NHANES]     Frequency of Communication with Friends and Family: More than three times a week     Frequency of Social Gatherings with Friends and Family: Twice a week     Active Member of Clubs or Organizations: No     Attends Club or Organization Meetings: Never     Marital Status:    Housing Stability: Low Risk  (2/4/2024)    Housing Stability Vital Sign     Unable to Pay for Housing in the Last Year: No     Number of Places Lived in the Last Year: 1     Unstable Housing in the Last Year: No       FH:   Family History   Problem Relation Age of Onset     Diabetes Other        OBHx:   OB History    Para Term  AB Living   3 0 0 0 2 0   SAB IAB Ectopic Multiple Live Births   2 0 0 0 0      # Outcome Date GA Lbr Pankaj/2nd Weight Sex Delivery Anes PTL Lv   3 Current            2 SAB            1 SAB               Obstetric Comments   Gynhx: 10/reg.   OCP -    Denies std,    Denies abnl, Pap 2016 NEG   S/p gardasil       Objective:       /88   Pulse 82   Temp 98.3 °F (36.8 °C) (Oral)   Resp 16   LMP 2023   SpO2 98%   Breastfeeding No     Vitals:    24 1744 24 1745 24 1746 24 1750   BP: 139/88      Pulse: 77 80 68 82   Resp: 16      Temp: 98.3 °F (36.8 °C)      TempSrc: Oral      SpO2:  98%  98%       General:   alert, appears stated age and cooperative, no apparent distress   HENT:  normocephalic, atraumatic   Eyes:  extraocular movements and conjunctivae normal   Neck:  supple, range of motion normal, no thyromegaly   Lungs:   no respiratory distress   Heart:   regular rate   Abdomen:  soft, non-tender, non-distended but gravid, no rebound or guarding    Extremities negative edema, negative erythema   FHT: 130, moderate BTBV, +accels, -decels;  Cat 1 (reassuring)                 TOCO: quiet   Presentations: cephalic by ultrasound   Cervix:     Dilation: 1cm    Effacement: 50%    Station:  -3    Consistency: soft    Position: middle   Sterile Speculum Exam: deferred    EFW by Leopold's: 6.5    Recent Growth Scan: EFW 3044b, 34%, AC 27%    Lab Review  Blood Type AB POS  GBBS: negative  Rubella: Immune  RPR: NR (1st trimester)  HIV: negative  HepB: negative  (1st trimester prenatal labs in media tab)     Assessment:       39w4d weeks gestation presenting for IOL.     Active Hospital Problems    Diagnosis  POA    *Encounter for induction of labor [Z34.90]  Not Applicable      Resolved Hospital Problems   No resolved problems to display.          Plan:   IOL   Risks, benefits, alternatives and possible complications  have been discussed in detail with the patient.   - Consents signed and to chart  - Admit to Labor and Delivery unit   - Epidural per Anesthesia  - Draw CBC, T&S  - Notify Staff  - Recheck in 4 hrs or PRN  - EFW 34%  - Guzman balloon placement was attempted. Will administer a dose of cytotec and attempt placement again at next cervical check if needed.     2. Anxiety  -Continue home Wellbutrin  -2 week post partum mood check     3. IVF pregnancy  - Negative genetic screening    4. Anemia  - H/H on admit 10.6/33.4  - post partum iron/colace      Post-Partum Hemorrhage risk - low    Zully Mckeon MD  [facility]   OBGYN PGY2

## 2024-02-05 NOTE — PROGRESS NOTES
LABOR NOTE    S:  Complaints: No.  Epidural working:  yes  Resident to bedside for cervical exam    O: BP (!) 151/72 Comment: MD Peng informed, will reassess and send labs  Pulse 65   Temp 98.2 °F (36.8 °C) (Oral)   Resp 18   LMP 03/25/2023   SpO2 100%   Breastfeeding No     FHT: 135, mod  BTBV, + accels, -decels Cat 1 (reassuring)  CTX: q3, pit @8  SVE: 4/70/-3, AROM clr     TIMELINE:  1830: 1/50/-3, cytotec x1  2240: 1.5/70/-3, tran placed  0230: 4/70/-3, AROM clr, pit @8       PLAN:  Continue Close Maternal/Fetal Monitoring  Pitocin Augmentation per protocol  Recheck 4 hours or PRN      gHTN  BP: (132-151)/(72-90) 151/72  -Multiple mild range BP meeting criteria for gHTN  -PreE labs wnl  Recent Labs   Lab 02/04/24  1738 02/04/24  2311 02/05/24  0129   WBC 7.74  --   --    HGB 10.6*  --   --    HCT 33.4*  --   --      --   --    BUN  --  9  --    CREATININE  --  0.7  --    ALT  --  9*  --    AST  --  13  --    UTPCR  --   --  0.10            Zully Mckeon MD  [facility]   OBGYN PGY2

## 2024-02-05 NOTE — PROGRESS NOTES
[facility] - Labor & Delivery  Obstetrics  Labor Progress Note    Patient Name: Isabelle Anderson  MRN: [id number]  Admission Date: 2024  Hospital Length of Stay: 1 days  Attending Physician: Jeansonne, Julie R., MD  Primary Care Provider: Yoko Valdez MD    Subjective:     Principal Problem:Encounter for induction of labor    Interval History:  Isabelle is a 33 y.o.  at 39w5d. She is doing well. S/p epidural.     Objective:     Vital Signs (Most Recent):  Temp: 97.8 °F (36.6 °C) (24 0930)  Pulse: 86 (24 1045)  Resp: 18 (24 0500)  BP: 125/70 (24 1045)  SpO2: 100 % (24 1045) Vital Signs (24h Range):  Temp:  [97.7 °F (36.5 °C)-98.3 °F (36.8 °C)] 97.8 °F (36.6 °C)  Pulse:  [] 86  Resp:  [16-18] 18  SpO2:  [95 %-100 %] 100 %  BP: ()/(52-97) 125/70        There is no height or weight on file to calculate BMI.    FHT: Cat 1  TOCO:  Q 3 min minutes    Physical Exam    Cervical Exam:  Dilation:  9  Effacement:  90  Station: 0  Presentation: Vertex     Significant Labs:  Lab Results   Component Value Date    GROUPTRH AB POS 2024    HEPBSAG Non-reactive 2024    STREPBCULT No Group B Streptococcus isolated 2024           Assessment/Plan:     33 y.o. female  at 39w5d for:    Active Diagnoses:    Diagnosis Date Noted POA    PRINCIPAL PROBLEM:  Encounter for induction of labor [Z34.90] 2024 Not Applicable      Problems Resolved During this Admission:       Anticipate .  Continue pitocin augmentation    Yaneth Sol MD  Obstetrics  [facility] - Labor & Delivery

## 2024-02-05 NOTE — PROGRESS NOTES
LABOR NOTE    S:  Complaints: No.  Epidural working:  yes  Resident to bedside for cervical exam    O: /79   Pulse 91   Temp 97.8 °F (36.6 °C) (Oral)   Resp 18   LMP 03/25/2023   SpO2 100%   Breastfeeding No     FHT: 170, mod  BTBV, + accels, -decels Cat 2 (reassuring)  CTX: q3, pit @12    TIMELINE:  1830: 1/50/-3, cytotec x1  2240: 1.5/70/-3, tran placed  0230: 4/70/-2, AROM clr, pit @8   0500: 5/80/-1, pit @12   0830: 7/70/-1  1015: 9/90/0  1145: 9/90/0, IUPC placed   1300: 9.5/90/0      PLAN:  Continue Close Maternal/Fetal Monitoring  Pitocin Augmentation per protocol  Recheck 4 hours or PRN    Chorio   - Maternal temp 100.9  - Fetal Tachycardia to 170s  - Amp/Gent ordered   - Tylenol ordered    gHTN  BP: ()/(52-97) 128/79  -BP stable   -PreE labs wnl  Recent Labs   Lab 02/04/24  1738 02/04/24  2311 02/05/24  0129   WBC 7.74  --   --    HGB 10.6*  --   --    HCT 33.4*  --   --      --   --    BUN  --  9  --    CREATININE  --  0.7  --    ALT  --  9*  --    AST  --  13  --    UTPCR  --   --  0.10        Yaneth Sol MD PGY-2  Obstetrics and Gynecology  [facility]

## 2024-02-05 NOTE — PROGRESS NOTES
LABOR NOTE    S:  Complaints: No.  Epidural working:  yes  Resident to bedside for cervical exam    O: /71   Pulse 65   Temp 98.2 °F (36.8 °C) (Oral)   Resp 18   LMP 03/25/2023   SpO2 97%   Breastfeeding No     FHT: 135, mod  BTBV, + accels, -decels Cat 1 (reassuring)  CTX: q3, pit @12  SVE: 5/80/-1    TIMELINE:  1830: 1/50/-3, cytotec x1  2240: 1.5/70/-3, tran placed  0230: 4/70/-2, AROM clr, pit @8   0500: 5/80/-1, pit @12       PLAN:  Continue Close Maternal/Fetal Monitoring  Pitocin Augmentation per protocol  Recheck 4 hours or PRN      gHTN  BP: (106-160)/(67-97) 109/71  -Multiple mild range BP meeting criteria for gHTN  -PreE labs wnl  Recent Labs   Lab 02/04/24  1738 02/04/24  2311 02/05/24  0129   WBC 7.74  --   --    HGB 10.6*  --   --    HCT 33.4*  --   --      --   --    BUN  --  9  --    CREATININE  --  0.7  --    ALT  --  9*  --    AST  --  13  --    UTPCR  --   --  0.10            Zully Mckeon MD  [facility]   OBGYN PGY2

## 2024-02-05 NOTE — PROGRESS NOTES
LABOR NOTE- LATE ENTRY 1015    S:  Complaints: No.  Epidural working:  yes  Resident to bedside for cervical exam    O: /79   Pulse 91   Temp 97.8 °F (36.6 °C) (Oral)   Resp 18   LMP 03/25/2023   SpO2 100%   Breastfeeding No     FHT: 135, mod  BTBV, + accels, -decels Cat 1 (reassuring)  CTX: q3, pit @12  SVE: 5/80/-1    TIMELINE:  1830: 1/50/-3, cytotec x1  2240: 1.5/70/-3, tran placed  0230: 4/70/-2, AROM clr, pit @8   0500: 5/80/-1, pit @12   0830: 7/70/-1  1015: 9/90/0      PLAN:  Continue Close Maternal/Fetal Monitoring  Pitocin Augmentation per protocol  Recheck 4 hours or PRN    gHTN  BP: ()/(52-97) 128/79  -BP stable   -PreE labs wnl  Recent Labs   Lab 02/04/24  1738 02/04/24  2311 02/05/24  0129   WBC 7.74  --   --    HGB 10.6*  --   --    HCT 33.4*  --   --      --   --    BUN  --  9  --    CREATININE  --  0.7  --    ALT  --  9*  --    AST  --  13  --    UTPCR  --   --  0.10        Yaneth Sol MD PGY-2  Obstetrics and Gynecology  [facility]

## 2024-02-05 NOTE — PROGRESS NOTES
LABOR NOTE- LATE ENTRY 1145    S:  Complaints: No.  Epidural working:  yes  Resident to bedside for cervical exam    O: /79   Pulse 91   Temp 97.8 °F (36.6 °C) (Oral)   Resp 18   LMP 03/25/2023   SpO2 100%   Breastfeeding No     FHT: 135, mod  BTBV, + accels, -decels Cat 1 (reassuring)  CTX: q3, pit @12    TIMELINE:  1830: 1/50/-3, cytotec x1  2240: 1.5/70/-3, tran placed  0230: 4/70/-2, AROM clr, pit @8   0500: 5/80/-1, pit @12   0830: 7/70/-1  1015: 9/90/0  1145: 9/90/0, IUPC placed       PLAN:  Continue Close Maternal/Fetal Monitoring  Pitocin Augmentation per protocol  Recheck 4 hours or PRN    gHTN  BP: ()/(52-97) 128/79  -BP stable   -PreE labs wnl  Recent Labs   Lab 02/04/24  1738 02/04/24  2311 02/05/24  0129   WBC 7.74  --   --    HGB 10.6*  --   --    HCT 33.4*  --   --      --   --    BUN  --  9  --    CREATININE  --  0.7  --    ALT  --  9*  --    AST  --  13  --    UTPCR  --   --  0.10        Yaneth Sol MD PGY-2  Obstetrics and Gynecology  [facility]

## 2024-02-05 NOTE — PROGRESS NOTES
[facility] - Labor & Delivery  Obstetrics  Labor Progress Note    Patient Name: Isabelle Anderson  MRN: [id number]  Admission Date: 2024  Hospital Length of Stay: 1 days  Attending Physician: Jeansonne, Julie R., MD  Primary Care Provider: Yoko Valdez MD    Subjective:     Principal Problem:Encounter for induction of labor    Interval History:  Isabelle is a 33 y.o.  at 39w5d. She is doing well. S/p epidural.     Objective:     Vital Signs (Most Recent):  Temp: 97.7 °F (36.5 °C) (24 0715)  Pulse: 76 (24 0915)  Resp: 18 (24 0500)  BP: 114/75 (24 0915)  SpO2: 100 % (24 0915) Vital Signs (24h Range):  Temp:  [97.7 °F (36.5 °C)-98.3 °F (36.8 °C)] 97.7 °F (36.5 °C)  Pulse:  [] 76  Resp:  [16-18] 18  SpO2:  [95 %-100 %] 100 %  BP: ()/(52-97) 114/75        There is no height or weight on file to calculate BMI.    FHT: Cat 1  TOCO:  Q 3 min minutes    Physical Exam    Cervical Exam:  Dilation:  7  Effacement:  75%  Station: -1  Presentation: Vertex     Significant Labs:  Lab Results   Component Value Date    GROUPTRH AB POS 2024    HEPBSAG Non-reactive 2024    STREPBCULT No Group B Streptococcus isolated 2024           Assessment/Plan:     33 y.o. female  at 39w5d for:    Active Diagnoses:    Diagnosis Date Noted POA    PRINCIPAL PROBLEM:  Encounter for induction of labor [Z34.90] 2024 Not Applicable      Problems Resolved During this Admission:       Anticipate .     Julie R Jeansonne, MD  Obstetrics  [facility] - Labor & Delivery

## 2024-02-05 NOTE — PLAN OF CARE
24 0843   OB SCREEN   Assessment Type Discharge Planning Brief Assessment   Source of Information health record   Received Prenatal Care Yes   Any indications/suspicions for None   Is this a teen pregnancy No   Is the baby in NICU No   Indication for adoption/Safe Haven No   Indication for DME/post-acute needs No   HIV (+) No   Any congenital  disorders No   Fetal demise/ death No     Patient has been screened for Social Work discharge planning needs. Based on documentation in medical record, no discharge planning needs are anticipated at this time. Should any discharge planning needs arise, please consult .

## 2024-02-05 NOTE — ANESTHESIA PROCEDURE NOTES
Epidural    Patient location during procedure: OB   Reason for block: primary anesthetic   Reason for block: labor analgesia requested by patient and obstetrician  Diagnosis: IUP   Start time: 2/4/2024 11:46 PM  Timeout: 2/4/2024 11:45 PM  End time: 2/5/2024 12:00 AM    Staffing  Performing Provider: Estiven Kelly MD  Authorizing Provider: Nayana Mclean MD    Staffing  Performed by: Estiven Kelly MD  Authorized by: Nayana Mclean MD        Preanesthetic Checklist  Completed: patient identified, IV checked, site marked, risks and benefits discussed, surgical consent, monitors and equipment checked, pre-op evaluation, timeout performed, anesthesia consent given, hand hygiene performed and patient being monitored  Preparation  Patient position: sitting  Prep: ChloraPrep  Patient monitoring: Blood Pressure and Pulse Ox  Reason for block: primary anesthetic   Epidural  Skin Anesthetic: lidocaine 1%  Skin Wheal: 3 mL  Administration type: continuous  Approach: midline  Interspace: L4-5    Injection technique: DAMIEN air  Needle and Epidural Catheter  Needle type: Tuohy   Needle gauge: 17  Needle length: 3.5 inches  Needle insertion depth: 6 cm  Catheter type: springwTrapmine  Catheter size: 19 G  Catheter at skin depth: 10 cm  Insertion Attempts: 1  Test dose: 3 mL of lidocaine 1.5% with Epi 1-to-200,000  Additional Documentation: incremental injection, negative aspiration for heme and CSF, no paresthesia on injection, no signs/symptoms of IV or SA injection, no significant complaints from patient and no significant pain on injection  Needle localization: anatomical landmarks  Medications:  Volume per aspiration: 5 mL  Time between aspirations: 3 minutes   Assessment  Ease of block: easy  Patient's tolerance of the procedure: comfortable throughout block and no complaints No inadvertent dural puncture with Tuohy.  Dural puncture performed with spinal needle.

## 2024-02-05 NOTE — PLAN OF CARE
Problem:  Fall Injury Risk  Goal: Absence of Fall, Infant Drop and Related Injury  Outcome: Ongoing, Progressing     Problem: Adult Inpatient Plan of Care  Goal: Plan of Care Review  Outcome: Ongoing, Progressing  Goal: Patient-Specific Goal (Individualized)  Outcome: Ongoing, Progressing  Goal: Absence of Hospital-Acquired Illness or Injury  Outcome: Ongoing, Progressing  Goal: Optimal Comfort and Wellbeing  Outcome: Ongoing, Progressing  Goal: Readiness for Transition of Care  Outcome: Ongoing, Progressing     Problem: Infection  Goal: Absence of Infection Signs and Symptoms  Outcome: Ongoing, Progressing     Problem: Bleeding (Labor)  Goal: Hemostasis  Outcome: Ongoing, Progressing     Problem: Change in Fetal Wellbeing (Labor)  Goal: Stable Fetal Wellbeing  Outcome: Ongoing, Progressing      Yes

## 2024-02-05 NOTE — L&D DELIVERY NOTE
[facility] - Labor & Delivery  Vaginal Delivery   Operative Note    SUMMARY     After approximately 75 minutes of maternal pushing, decision made to proceed with operative delivery. Verbal consent was obtained. Patient was counseled on r/b/a. Charge RN, NICU, and anesthesia made aware. OB staff present. Decision made to attempt operative delivery labor room.    EFW 3044g 34% @37w5d. Patient's pelvis though to be adequate and appropriate for operative delivery.     Indication for operative delivery: non reassuring fetal status- Fetal tachycardia to the 180s-200s for approximately 2 hours with minimal fetal descent. .    Fetal position was confirmed as PEYTON. vacuum applied to fetal head. Position and correct application again reconfirmed. 6 number of pulls with 6 of contractions were performed with fetal head descent. One pop-off occurred. Once fetal head at perineum.  Infant delivered OA over  perineum.    Nuchal x1 reduced at introitus. Cord was immediately clamped and cut and infant was handed to NICU team for resuscitation. Cord clamped and cut. Umbilical arterial gas and venous blood obtained.  2nd degree laceration was repaired in normal fashion with 2-0 vicryl.   Placenta delivered spontaneously and IV pitocin given. Uterine atony was noted and 800 mg MD cytotec was given with improvement in tone.   Uterine tone noted. No cervical lacerations.  Patient tolerated delivery well.  EBL 350cc  Staff present for entire procedure.  S/L/N counts correct x2.      Indications: Vacuum-assisted vaginal delivery  Pregnancy complicated by:   Patient Active Problem List   Diagnosis    Missed     S/P D&C (status post dilation and curettage)    LEBRON (generalized anxiety disorder)    Amenorrhea     (spontaneous vaginal delivery)    Vacuum-assisted vaginal delivery    Gestational hypertension, antepartum    Chorioamnionitis    Obesity    Anemia     Admitting GA: 39w5d    Delivery Information for Jacek Anderson    Birth  "information:  YOB: 2023   Time of birth: 3:34 PM   Sex: female   Head Delivery Date/Time: 2024  3:34 PM   Delivery type: Vaginal, Vacuum (Extractor)   Gestational Age: 39w5d        Delivery Providers    Delivering clinician: Jeansonne, Julie R., MD   Provider Role    Genny Rodriguez, RN Delivery Nurse    Unique Mcknight RN Delivery Nurse    Zan Gallardo, RN Delivery Nurse              Measurements    Weight: 3147 g  Weight (lbs): 6 lb 15 oz  Length: 53.3 cm  Length (in): 21"  Head circumference: 34.3 cm  Chest circumference: 30.5 cm         Apgars    Living status: Living  Apgar Component Scores:  1 min.:  5 min.:  10 min.:  15 min.:  20 min.:    Skin color:  1  1       Heart rate:  2  2       Reflex irritability:  2  2       Muscle tone:  1  1       Respiratory effort:  2  2       Total:  8  8       Apgars assigned by: NICU         Operative Delivery    Forceps attempted?: No  Vacuum extractor attempted?: Yes  Vacuum indications: Fetal Heart Rate or Rhythm Abnormality  Vacuum type: Kiwi Omni Cup (mushroom)  Vacuum application location: Mid  First attempt time vacuum applied: 2024 15:28:33  First attempt time vacuum removed: 2024 15:28:54  Second attempt time vacuum applied: 2024 15:30:08  Second attempt time vacuum removed: 2024 15:30:42  Third attempt time vacuum applied: 2024 15:33:24  Third attempt time vacuum removed: 2024 15:33:57  Number of pop offs: 1  Number of pulls with vacuum: 6  Total vacuum application time: 6 minutes  Vacuum applied by: DR JULIE JEANSONNE  Failed vacuum delivery?: No         Shoulder Dystocia    Shoulder dystocia present?: No           Presentation    Presentation: Vertex  Position: Left Occiput Anterior           Interventions/Resuscitation    Method: NICU Attended       Cord    Vessels: 3 vessels  Complications: None  Delayed Cord Clamping?: Yes  Cord Clamped Date/Time: 2024  3:35 PM  Cord Blood Disposition: Sent with " Baby  Gases Sent?: No  Stem Cell Collection (by MD): No       Placenta    Placenta delivery date/time: 2024 1537  Placenta removal: Spontaneous  Placenta appearance: Intact  Placenta disposition: Discarded           Labor Events:       labor: No     Labor Onset Date/Time:         Dilation Complete Date/Time:         Start Pushing Date/Time:         Start Pushing Date/Time:       Rupture Date/Time: 24  0230         Rupture type: ARM (Artificial Rupture)         Fluid Amount: Moderate      Fluid Color: Clear               steroids: None     Antibiotics given for GBS: No     Induction: amniotomy;balloon dilation (Guzman)     Indications for induction:  Elective     Augmentation: oxytocin     Indications for augmentation: Ineffective Contraction Pattern     Labor complications: Chorioamnionitis     Additional complications:          Cervical ripenin2024 7:20 PM      Misoprostol          Delivery:      Episiotomy: None     Indication for Episiotomy:       Perineal Lacerations: 2nd Repaired:  Yes   Periurethral Laceration:   Repaired:     Labial Laceration:   Repaired:     Sulcus Laceration:   Repaired:     Vaginal Laceration:   Repaired:     Cervical Laceration:   Repaired:     Repair suture: None     Repair # of packets: 2     Last Value - EBL - Nursing (mL):       Sum - EBL - Nursing (mL): 0     Last Value - EBL - Anesthesia (mL):      Calculated QBL (mL): 325      Running total QBL (mL): 325      Vaginal Sweep Performed: No     Surgicount Correct: Yes     Vaginal Packing: No Quantity:       Other providers:       Anesthesia    Method: Epidural          Details (if applicable):  Trial of Labor      Categorization:      Priority:     Indications for :     Incision Type:       Additional  information:  Forceps:    Vacuum:    Breech:    Observed anomalies    Other (Comments):

## 2024-02-05 NOTE — PROGRESS NOTES
LABOR NOTE    S:  Complaints: No.  Epidural working:  not applicable  Resident to bedside for cervical exam    O: BP (!) 151/72 Comment: MD Peng informed, will reassess and send labs  Pulse 65   Temp 98.2 °F (36.8 °C) (Oral)   Resp 18   LMP 03/25/2023   SpO2 100%   Breastfeeding No     FHT: 130, mod  BTBV, + accels, -decels Cat 1 (reassuring)  CTX: difficult to discern on toco  SVE: 1.5/70/-3, tran placed    TIMELINE:  1830: 1/50/-3, cytotec x1  2240: 1.5/70/-3, tran placed      PLAN:  Will start pitocin 4 hours after cytotec dose (2320)  Continue Close Maternal/Fetal Monitoring  Pitocin Augmentation per protocol  Recheck 4 hours or PRN      gHTN  -BP: (132-151)/(72-90) 151/72  -Multiple mild range BP meeting criteria for gHTN  -Will order CBC, CMP, and PCR      Zully Mckeon MD  [facility]   OBGYN PGY2

## 2024-02-06 PROBLEM — O13.9 GESTATIONAL HYPERTENSION, ANTEPARTUM: Status: ACTIVE | Noted: 2024-02-06

## 2024-02-06 PROBLEM — D64.9 ANEMIA: Status: ACTIVE | Noted: 2024-02-06

## 2024-02-06 PROBLEM — O41.1290 CHORIOAMNIONITIS: Status: ACTIVE | Noted: 2024-02-06

## 2024-02-06 PROBLEM — E66.9 OBESITY: Status: ACTIVE | Noted: 2024-02-06

## 2024-02-06 LAB
ANISOCYTOSIS BLD QL SMEAR: SLIGHT
BASOPHILS # BLD AUTO: 0.01 K/UL (ref 0–0.2)
BASOPHILS NFR BLD: 0.1 % (ref 0–1.9)
DIFFERENTIAL METHOD BLD: ABNORMAL
EOSINOPHIL # BLD AUTO: 0 K/UL (ref 0–0.5)
EOSINOPHIL NFR BLD: 0.1 % (ref 0–8)
ERYTHROCYTE [DISTWIDTH] IN BLOOD BY AUTOMATED COUNT: 13.3 % (ref 11.5–14.5)
HCT VFR BLD AUTO: 25.5 % (ref 37–48.5)
HGB BLD-MCNC: 8.4 G/DL (ref 12–16)
IMM GRANULOCYTES # BLD AUTO: 0.17 K/UL (ref 0–0.04)
IMM GRANULOCYTES NFR BLD AUTO: 1.1 % (ref 0–0.5)
LYMPHOCYTES # BLD AUTO: 1.6 K/UL (ref 1–4.8)
LYMPHOCYTES NFR BLD: 9.9 % (ref 18–48)
MCH RBC QN AUTO: 26.2 PG (ref 27–31)
MCHC RBC AUTO-ENTMCNC: 32.9 G/DL (ref 32–36)
MCV RBC AUTO: 79 FL (ref 82–98)
MONOCYTES # BLD AUTO: 1 K/UL (ref 0.3–1)
MONOCYTES NFR BLD: 6 % (ref 4–15)
NEUTROPHILS # BLD AUTO: 13.4 K/UL (ref 1.8–7.7)
NEUTROPHILS NFR BLD: 82.8 % (ref 38–73)
NRBC BLD-RTO: 0 /100 WBC
PLATELET # BLD AUTO: 201 K/UL (ref 150–450)
PLATELET BLD QL SMEAR: ABNORMAL
PMV BLD AUTO: 10.4 FL (ref 9.2–12.9)
RBC # BLD AUTO: 3.21 M/UL (ref 4–5.4)
WBC # BLD AUTO: 16.14 K/UL (ref 3.9–12.7)

## 2024-02-06 PROCEDURE — 85025 COMPLETE CBC W/AUTO DIFF WBC: CPT | Performed by: OBSTETRICS & GYNECOLOGY

## 2024-02-06 PROCEDURE — 25000003 PHARM REV CODE 250

## 2024-02-06 PROCEDURE — 63600175 PHARM REV CODE 636 W HCPCS

## 2024-02-06 PROCEDURE — 11000001 HC ACUTE MED/SURG PRIVATE ROOM

## 2024-02-06 PROCEDURE — 36415 COLL VENOUS BLD VENIPUNCTURE: CPT | Performed by: OBSTETRICS & GYNECOLOGY

## 2024-02-06 RX ORDER — DOCUSATE SODIUM 100 MG/1
100 CAPSULE, LIQUID FILLED ORAL DAILY
Status: DISCONTINUED | OUTPATIENT
Start: 2024-02-06 | End: 2024-02-07 | Stop reason: HOSPADM

## 2024-02-06 RX ORDER — LANOLIN ALCOHOL/MO/W.PET/CERES
1 CREAM (GRAM) TOPICAL DAILY
Status: DISCONTINUED | OUTPATIENT
Start: 2024-02-06 | End: 2024-02-07 | Stop reason: HOSPADM

## 2024-02-06 RX ADMIN — ACETAMINOPHEN 650 MG: 325 TABLET, FILM COATED ORAL at 01:02

## 2024-02-06 RX ADMIN — ACETAMINOPHEN 650 MG: 325 TABLET, FILM COATED ORAL at 08:02

## 2024-02-06 RX ADMIN — IBUPROFEN 600 MG: 600 TABLET, FILM COATED ORAL at 06:02

## 2024-02-06 RX ADMIN — AMPICILLIN SODIUM 2 G: 2 INJECTION, POWDER, FOR SOLUTION INTRAMUSCULAR; INTRAVENOUS at 01:02

## 2024-02-06 RX ADMIN — ACETAMINOPHEN 650 MG: 325 TABLET, FILM COATED ORAL at 02:02

## 2024-02-06 RX ADMIN — IBUPROFEN 600 MG: 600 TABLET, FILM COATED ORAL at 05:02

## 2024-02-06 RX ADMIN — IBUPROFEN 600 MG: 600 TABLET, FILM COATED ORAL at 11:02

## 2024-02-06 RX ADMIN — DOCUSATE SODIUM 200 MG: 100 CAPSULE, LIQUID FILLED ORAL at 08:02

## 2024-02-06 RX ADMIN — PRENATAL VIT W/ FE FUMARATE-FA TAB 27-0.8 MG 1 TABLET: 27-0.8 TAB at 08:02

## 2024-02-06 NOTE — PLAN OF CARE
Overnight, AVSS. Ambulating and voiding independently without difficulty. Fundus is firm,midline and with moderate amount of lochia.  Assisted during initiation of breastfeeding.  Appropriate bonding with baby. Pain well controlled with scheduled PO meds. Safety maintained.

## 2024-02-06 NOTE — LACTATION NOTE
02/06/24 1415   Breasts WDL   Breast WDL WDL   Maternal Feeding Assessment   Maternal Emotional State assist needed   Latch Assistance yes   Reproductive Interventions   Breast Care: Breastfeeding open to air   Breastfeeding Assistance assisted with positioning;feeding cue recognition promoted;feeding on demand promoted;hand expression verified;infant latch-on verified;feeding session observed;infant suck/swallow verified;support offered   Breastfeeding Support maternal rest encouraged;maternal nutrition promoted;maternal hydration promoted;lactation counseling provided;infant-mother separation minimized;encouragement provided;diary/feeding log utilized     Lactation note: LC to room to assist with latch. LC assisted pt with positioning herself and infant for optimal latch. Infant placed cross cradle on the left breast, some on and off then sustains, needs breast compression and infant stimulation  to continue the feeding. Pt will continue to nurse the baby, hand express and spoon feeding ,pump after feeding  and offer supplementation as needed and desired. Encouraged use of EBM first, and follow with formula as needed.

## 2024-02-06 NOTE — ANESTHESIA POSTPROCEDURE EVALUATION
Anesthesia Post Evaluation    Patient: Isabelle Anderson    Procedure(s) Performed: * No procedures listed *    Final Anesthesia Type: epidural      Patient location during evaluation: floor  Patient participation: Yes- Able to Participate  Level of consciousness: awake and alert  Post-procedure vital signs: reviewed and stable  Pain management: adequate  Airway patency: patent  MARSHA mitigation strategies: Use of major conduction anesthesia (spinal/epidural) or peripheral nerve block and Multimodal analgesia  PONV status at discharge: No PONV  Anesthetic complications: no      Cardiovascular status: blood pressure returned to baseline  Respiratory status: unassisted, spontaneous ventilation and room air  Hydration status: euvolemic  Follow-up not needed.              Vitals Value Taken Time   /80 02/06/24 1254   Temp 36.9 °C (98.4 °F) 02/06/24 1254   Pulse 75 02/06/24 1254   Resp 18 02/06/24 1254   SpO2 98 % 02/06/24 1254         No case tracking events are documented in the log.      Pain/Rishi Score: Pain Rating Prior to Med Admin: 2 (2/6/2024  2:00 PM)  Pain Rating Post Med Admin: 1 (2/6/2024  6:50 AM)

## 2024-02-06 NOTE — PLAN OF CARE
Problem: Adult Inpatient Plan of Care  Goal: Plan of Care Review  Outcome: Ongoing, Progressing     Problem:  Fall Injury Risk  Goal: Absence of Fall, Infant Drop and Related Injury  Outcome: Ongoing, Progressing     Problem: Infection  Goal: Absence of Infection Signs and Symptoms  Outcome: Ongoing, Progressing     Problem: Fatigue  Goal: Improved Activity Tolerance  Outcome: Ongoing, Progressing     Problem: Breastfeeding  Goal: Effective Breastfeeding  Outcome: Ongoing, Progressing

## 2024-02-06 NOTE — PROGRESS NOTES
POSTPARTUM PROGRESS NOTE    Subjective:     PPD/POD#: 1   Procedure: Vacuum-assisted vaginal delivery   EGA: 39w5d   N/V: No   F/C: No   Abd Pain: Mild, well-controlled with oral pain medication   Lochia: Mild   Voiding: Yes   Ambulating: Yes   Bowel fnc: Yes   Contraception: Per primary OB     Objective:      Temp:  [97.7 °F (36.5 °C)-98.7 °F (37.1 °C)] 97.7 °F (36.5 °C)  Pulse:  [] 79  Resp:  [18-20] 18  SpO2:  [91 %-100 %] 97 %  BP: (112-147)/(58-93) 117/67    Abdomen: Soft, appropriately tender   Uterus: Firm, no fundal tenderness   Incision: N/A     Lab Review    Recent Labs   Lab 02/04/24  2311      K 4.4      CO2 20*   BUN 9   CREATININE 0.7   GLU 91   PROT 6.0   BILITOT 0.3   ALKPHOS 129   ALT 9*   AST 13       Recent Labs   Lab 02/04/24  1738 02/05/24  1551 02/05/24  1552   WBC 7.74  --   --    HGB 10.6*  --   --    HCT 33.4* 53 50   MCV 80*  --   --      --   --          I/O    Intake/Output Summary (Last 24 hours) at 2/6/2024 0645  Last data filed at 2/6/2024 0507  Gross per 24 hour   Intake 1511.56 ml   Output 775 ml   Net 736.56 ml        Assessment and Plan:   Postpartum care:  - Patient doing well.  - Continue routine management and advances.    Chorioamnionitis  - VS as above  - currently afebrile  - no fundal tenderness  - Ampicillin/Gentamicin: status post  - s/p rocephin at time of delivery    gHTN  - BP as above  - asymptomatic  - preE labs as above  - UOP: 0.53 cc/kg/hr  - Mag: not indicated  - Hypertensive agent: not indicated     Mood Disorder  - Mood stable  - Medications: continue wellbutrin   - Will need 1-2 week postpartum mood check     Anemia   - H/H in process this AM  - QBL: 350  - asymptomatic  - will start iron/colace if < 10/30     Obesity  - PrePreg BMI 31  - Encourage ambulation  - Lovenox: not indicated    Sade Arellano MD  Obstetrics and Gynecology - PGY1

## 2024-02-06 NOTE — LACTATION NOTE
02/06/24 1100   Breasts WDL   Breast WDL WDL   Maternal Feeding Assessment   Maternal Emotional State assist needed   Latch Assistance   (to call)   Reproductive Interventions   Breast Care: Breastfeeding open to air   Breastfeeding Assistance electric breast pump used  (pt currently pumping)   Breastfeeding Support maternal rest encouraged;maternal nutrition promoted;maternal hydration promoted;lactation counseling provided;infant-mother separation minimized;encouragement provided;diary/feeding log utilized     Lactation note: Basic education reviewed. Pt states that she has started pumping because infant did not latch well last night. LC reviewed normal latch and learning , supply and demand, stomach capacity, normal feeding . Pt encouraged to call LC for latch assistance with next feeding.

## 2024-02-06 NOTE — LACTATION NOTE
Red Robot Lab[employer]ony pump, tubing, collections containers and labels brought to bedside.  Discussed proper pump setting of initiation phase.  Instructed on proper usage of pump and to adjust suction according to maximum comfort level.  Verified appropriate flange fit.  Educated on the frequency and duration of pumping in order to promote and maintain a full milk supply.  Hands on pumping technique reviewed.  Encouraged hand expression after pumping.  Instructed on cleaning of breast pump parts.  Written instructions also given.  Pt verbalized understanding and appropriate recall for proper milk handling, collection, labeling, storage and transportation.

## 2024-02-07 ENCOUNTER — PATIENT MESSAGE (OUTPATIENT)
Dept: OBSTETRICS AND GYNECOLOGY | Facility: CLINIC | Age: 34
End: 2024-02-07
Payer: OTHER GOVERNMENT

## 2024-02-07 VITALS
WEIGHT: 171.94 LBS | TEMPERATURE: 98 F | BODY MASS INDEX: 33.58 KG/M2 | OXYGEN SATURATION: 96 % | RESPIRATION RATE: 18 BRPM | SYSTOLIC BLOOD PRESSURE: 136 MMHG | HEART RATE: 60 BPM | DIASTOLIC BLOOD PRESSURE: 72 MMHG

## 2024-02-07 PROCEDURE — 25000003 PHARM REV CODE 250

## 2024-02-07 RX ADMIN — IBUPROFEN 600 MG: 600 TABLET, FILM COATED ORAL at 02:02

## 2024-02-07 RX ADMIN — FERROUS SULFATE TAB 325 MG (65 MG ELEMENTAL FE) 1 EACH: 325 (65 FE) TAB at 07:02

## 2024-02-07 RX ADMIN — IBUPROFEN 600 MG: 600 TABLET, FILM COATED ORAL at 07:02

## 2024-02-07 RX ADMIN — ACETAMINOPHEN 650 MG: 325 TABLET, FILM COATED ORAL at 01:02

## 2024-02-07 RX ADMIN — ACETAMINOPHEN 650 MG: 325 TABLET, FILM COATED ORAL at 07:02

## 2024-02-07 RX ADMIN — IBUPROFEN 600 MG: 600 TABLET, FILM COATED ORAL at 01:02

## 2024-02-07 RX ADMIN — DOCUSATE SODIUM 100 MG: 100 CAPSULE, LIQUID FILLED ORAL at 07:02

## 2024-02-07 RX ADMIN — PRENATAL VIT W/ FE FUMARATE-FA TAB 27-0.8 MG 1 TABLET: 27-0.8 TAB at 07:02

## 2024-02-07 RX ADMIN — BUPROPION HYDROCHLORIDE 150 MG: 150 TABLET, EXTENDED RELEASE ORAL at 07:02

## 2024-02-07 NOTE — DISCHARGE SUMMARY
Delivery Discharge Summary  Obstetrics      Primary OB Clinician: Jeansonne, Julie R., MD     Admission date: 2024  Discharge date: 2024    Disposition: To home, self care    Discharge Diagnosis List:  Patient Active Problem List   Diagnosis    Missed     S/P D&C (status post dilation and curettage)    LEBRON (generalized anxiety disorder)    Amenorrhea    Vacuum-assisted vaginal delivery    Gestational hypertension, antepartum    Chorioamnionitis    Obesity    Anemia       Procedure: VAVD     Hospital Course:  Isabelle Anderson is a 33 y.o. now , PPD #2 who was admitted on 2024 at 39w4d for IOL. IUP complicated by anxiety, anemia, IVF pregnancy  Patient was subsequently admitted to labor and delivery unit with signed consents.     Labor course was complicated by development of chorio and resulted in VAVD without complications.     Please see delivery note for further details. Her postpartum course was uncomplicated. On discharge day, patient's pain is controlled with oral pain medications. Pt is tolerating ambulation without SOB or CP, and regular diet without N/V. Reports lochia is mild. Denies any HA, vision changes, F/C, LE swelling. Denies any breast pain/soreness. She is s/p chorio abx.     Pt in stable condition and ready for discharge. She has been instructed to start and/or continue medications and follow up with her obstetrics provider as listed below.    Pertinent studies:  CBC  Recent Labs   Lab 24  1738 24  1551 24  1552 24  0632   WBC 7.74  --   --  16.14*   HGB 10.6*  --   --  8.4*   HCT 33.4* 53 50 25.5*   MCV 80*  --   --  79*     --   --  201          Immunization History   Administered Date(s) Administered    COVID-19, vector-nr, rS-Ad26, PF (Jorge) 2021    HPV Quadrivalent 2017, 2017, 2017    Influenza 10/09/2020    Influenza (FLUAD) - Quadrivalent - Adjuvanted - PF *Preferred* (65+) 10/01/2022    Influenza -  "Quadrivalent - MDCK - PF 2017, 10/05/2020, 10/20/2022, 10/06/2023    Influenza - Quadrivalent - PF *Preferred* (6 months and older) 10/28/2015, 10/21/2018, 2019, 10/21/2021    Influenza A (H1N1) 2009 Monovalent - IM 2009    MMR 1993, 1997    Meningococcal Conjugate (MCV4P) 2009, 2015    PPD Test 2009    Rsv, Bivalent, Rsvpref (Abrysvo) 2023    Td (ADULT) 2009    Tdap 2019, 2023        Delivery:    Episiotomy: None   Lacerations: 2nd   Repair suture: None   Repair # of packets: 2   Blood loss (ml):       Birth information:  YOB: 2023   Time of birth: 3:34 PM   Sex: female   Delivery type: Vaginal, Vacuum (Extractor)   Gestational Age: 39w5d     Measurements    Weight: 3147 g  Weight (lbs): 6 lb 15 oz  Length: 53.3 cm  Length (in): 21"  Head circumference: 34.3 cm  Chest circumference: 30.5 cm         Delivery Clinician: Delivery Providers    Delivering clinician: Jeansonne, Julie R., MD   Provider Role    Genny Rodriguez, ABELARDO Delivery Nurse    Unique Mcknight RN Delivery Nurse    Zan Gallardo, RN Delivery Nurse             Additional  information:  Forceps:    Vacuum:    Breech:    Observed anomalies      Living?:     Apgars    Living status: Living  Apgar Component Scores:  1 min.:  5 min.:  10 min.:  15 min.:  20 min.:    Skin color:  1  1       Heart rate:  2  2       Reflex irritability:  2  2       Muscle tone:  1  1       Respiratory effort:  2  2       Total:  8  8       Apgars assigned by: NICU         Placenta: Delivered:       appearance    Patient Instructions:   Current Discharge Medication List        START taking these medications    Details   docusate sodium (COLACE) 100 MG capsule Take 1 capsule (100 mg total) by mouth 2 (two) times daily.  Qty: 30 capsule, Refills: 0      ferrous sulfate (FEOSOL) 325 mg (65 mg iron) Tab tablet Take 1 tablet (325 mg total) by mouth daily with breakfast.  Qty: 30 tablet, " Refills: 0      HYDROcodone-acetaminophen (NORCO) 5-325 mg per tablet Take 1 tablet by mouth every 4 (four) hours as needed for Pain.  Qty: 10 tablet, Refills: 0    Comments: Quantity prescribed more than 7 day supply? No      ibuprofen (ADVIL,MOTRIN) 600 MG tablet Take 1 tablet (600 mg total) by mouth every 6 (six) hours as needed for Pain.  Qty: 30 tablet, Refills: 0           CONTINUE these medications which have NOT CHANGED    Details   buPROPion (WELLBUTRIN XL) 150 MG TB24 tablet Take 1 tablet (150 mg total) by mouth once daily.  Qty: 90 tablet, Refills: 4    Associated Diagnoses: LEBRON (generalized anxiety disorder)      pantoprazole (PROTONIX) 20 MG tablet Take 1 tablet (20 mg total) by mouth once daily.  Qty: 30 tablet, Refills: 1           STOP taking these medications       aspirin 81 MG Chew Comments:   Reason for Stopping:         ergocalciferol, vitamin D2, (VITAMIN D ORAL) Comments:   Reason for Stopping:         prenatal 25-iron-folate 6-dha 30 mg iron-1mg -200 mg Cap Comments:   Reason for Stopping:               Discharge Procedure Orders   Diet Adult Regular     No driving until:   Order Comments: No driving until not taking narcotic pain medication.     Pelvic Rest   Order Comments: Pelvic rest until 6 weeks after discharge. Nothing in vagina -no sex, tampons, douching, etc.     Notify your health care provider if you experience any of the following:  temperature >100.4     Notify your health care provider if you experience any of the following:  persistent nausea and vomiting or diarrhea     Notify your health care provider if you experience any of the following:  severe uncontrolled pain     Notify your health care provider if you experience any of the following:  redness, tenderness, or signs of infection (pain, swelling, redness, odor or green/yellow discharge around incision site)     Notify your health care provider if you experience any of the following:  difficulty breathing or increased  cough     Notify your health care provider if you experience any of the following:  severe persistent headache     Notify your health care provider if you experience any of the following:  worsening rash     Notify your health care provider if you experience any of the following:  persistent dizziness, light-headedness, or visual disturbances     Notify your health care provider if you experience any of the following:  increased confusion or weakness     Notify your health care provider if you experience any of the following:   Order Comments: Heavy vaginal bleeding saturating more than 1 pad per hr for at least consecutive 2 hrs.     Activity as tolerated        Follow-up Information       Jeansonne, Julie R., MD Follow up in 6 week(s).    Specialty: Obstetrics and Gynecology  Why: postpartum appointment  Contact information:  [address] [id number] [phone]               Jeansonne, Julie R., MD Follow up in 1 week(s).    Specialty: Obstetrics and Gynecology  Why: BP check  Contact information:  [address] [id number] [phone]                              Yaneth Sol MD PGY-2  Obstetrics and Gynecology  [facility]

## 2024-02-07 NOTE — MEDICAL/APP STUDENT
POSTPARTUM PROGRESS NOTE    Subjective:     PPD/POD#: 2   Procedure: Vacuum-assisted vaginal delivery   EGA: 39w5d   N/V: No   F/C: No   Abd Pain: Mild, well-controlled with oral pain medication   Lochia: Mild   Voiding: Yes   Ambulating: Yes   Bowel fnc: No. Pt reports having passed flatus, but she has not yet had a BM.   Contraception: Per primary OB. Pt verbalized preference for OCP.     Objective:      Temp:  [97.6 °F (36.4 °C)-98.4 °F (36.9 °C)] 97.8 °F (36.6 °C)  Pulse:  [63-86] 63  Resp:  [18] 18  SpO2:  [96 %-98 %] 96 %  BP: (109-130)/(61-80) 130/73    Abdomen: Soft, appropriately tender   Uterus: Firm, no fundal tenderness   Incision: N/A     Lab Review    Recent Labs   Lab 02/04/24  2311      K 4.4      CO2 20*   BUN 9   CREATININE 0.7   GLU 91   PROT 6.0   BILITOT 0.3   ALKPHOS 129   ALT 9*   AST 13       Recent Labs   Lab 02/04/24  1738 02/05/24  1551 02/05/24  1552 02/06/24  0632   WBC 7.74  --   --  16.14*   HGB 10.6*  --   --  8.4*   HCT 33.4* 53 50 25.5*   MCV 80*  --   --  79*     --   --  201         I/O  No intake or output data in the 24 hours ending 02/07/24 0708     Assessment and Plan:   Postpartum care:  - Patient doing well.  - Continue routine management and advances.    Anemia   - H/H as above  - QBL: 350 mL  - asymptomatic  - iron/colace    Chorioamnionitis  - VS as above  - currently afebrile  - no fundal tenderness  - Ampicillin/Gentamicin: status post  - s/p rocephin at time of delivery    Mood Disorder  - Mood stable  - Medications: Wellbutrin  - Will need 1-2 week postpartum mood check    Obesity  - PrePreg BMI 31  - Encourage ambulation  - Lovenox: not indicated    Rajiv Best, MS3    A student assisted me with documenting this service.  I saw the patient and performed the history, physical exam, and medical decision making.  I reviewed and verified all information documented by the student and made modifications to such documentation when appropriate.  -- NIDIA  Mark Lee M.D.

## 2024-02-07 NOTE — PLAN OF CARE
Problem:  Fall Injury Risk  Goal: Absence of Fall, Infant Drop and Related Injury  Outcome: Met     Problem: Adult Inpatient Plan of Care  Goal: Plan of Care Review  Outcome: Met  Goal: Patient-Specific Goal (Individualized)  Outcome: Met  Goal: Absence of Hospital-Acquired Illness or Injury  Outcome: Met  Goal: Optimal Comfort and Wellbeing  Outcome: Met  Goal: Readiness for Transition of Care  Outcome: Met     Problem: Infection  Goal: Absence of Infection Signs and Symptoms  Outcome: Met     Problem: Fall Injury Risk  Goal: Absence of Fall and Fall-Related Injury  Outcome: Met     Problem: Fatigue  Goal: Improved Activity Tolerance  Outcome: Met     Problem: Breastfeeding  Goal: Effective Breastfeeding  Outcome: Met     Problem: Adjustment to Role Transition (Postpartum Vaginal Delivery)  Goal: Successful Maternal Role Transition  Outcome: Met     Problem: Bleeding (Postpartum Vaginal Delivery)  Goal: Hemostasis  Outcome: Met     Problem: Infection (Postpartum Vaginal Delivery)  Goal: Absence of Infection Signs/Symptoms  Outcome: Met     Problem: Pain (Postpartum Vaginal Delivery)  Goal: Acceptable Pain Control  Outcome: Met     Problem: Urinary Retention (Postpartum Vaginal Delivery)  Goal: Effective Urinary Elimination  Outcome: Met

## 2024-02-07 NOTE — LACTATION NOTE
02/07/24 1020   Maternal Infant Feeding   Latch Assistance no   Equipment Type   Breast Pump Type double electric, hospital grade   Breast Pump Flange Size 24 mm   Breast Pumping   Breast Pumping Interventions post-feed pumping encouraged;frequent pumping encouraged;early pumping promoted   Community Referrals   Community Referrals support group;pediatric care provider;outpatient lactation program     Discharge lactation education reviewed with breastfeeding guide. Mother has been attempting to latch, some latches, then supplementing with formula, pumping for stimulation.     Plan to continue, nurse, pump, supplement. Pt has Spectra pump for home use. All questions answered.

## 2024-02-08 ENCOUNTER — PATIENT MESSAGE (OUTPATIENT)
Dept: OBSTETRICS AND GYNECOLOGY | Facility: OTHER | Age: 34
End: 2024-02-08
Payer: OTHER GOVERNMENT

## 2024-02-10 ENCOUNTER — HOSPITAL ENCOUNTER (EMERGENCY)
Facility: OTHER | Age: 34
Discharge: HOME | End: 2024-02-10
Attending: OBSTETRICS & GYNECOLOGY
Payer: OTHER GOVERNMENT

## 2024-02-10 VITALS
DIASTOLIC BLOOD PRESSURE: 98 MMHG | RESPIRATION RATE: 17 BRPM | TEMPERATURE: 98 F | HEART RATE: 56 BPM | SYSTOLIC BLOOD PRESSURE: 159 MMHG | OXYGEN SATURATION: 97 %

## 2024-02-10 DIAGNOSIS — R03.0 ELEVATED BLOOD PRESSURE READING: Primary | ICD-10-CM

## 2024-02-10 LAB
ALBUMIN SERPL BCP-MCNC: 2.9 G/DL (ref 3.5–5.2)
ALP SERPL-CCNC: 145 U/L (ref 55–135)
ALT SERPL W/O P-5'-P-CCNC: 56 U/L (ref 10–44)
ANION GAP SERPL CALC-SCNC: 13 MMOL/L (ref 8–16)
AST SERPL-CCNC: 41 U/L (ref 10–40)
BASOPHILS # BLD AUTO: 0.03 K/UL (ref 0–0.2)
BASOPHILS NFR BLD: 0.4 % (ref 0–1.9)
BILIRUB SERPL-MCNC: 0.3 MG/DL (ref 0.1–1)
BUN SERPL-MCNC: 11 MG/DL (ref 6–20)
CALCIUM SERPL-MCNC: 9.2 MG/DL (ref 8.7–10.5)
CHLORIDE SERPL-SCNC: 106 MMOL/L (ref 95–110)
CO2 SERPL-SCNC: 19 MMOL/L (ref 23–29)
CREAT SERPL-MCNC: 0.8 MG/DL (ref 0.5–1.4)
DIFFERENTIAL METHOD BLD: ABNORMAL
EOSINOPHIL # BLD AUTO: 0.1 K/UL (ref 0–0.5)
EOSINOPHIL NFR BLD: 1 % (ref 0–8)
ERYTHROCYTE [DISTWIDTH] IN BLOOD BY AUTOMATED COUNT: 13.5 % (ref 11.5–14.5)
EST. GFR  (NO RACE VARIABLE): >60 ML/MIN/1.73 M^2
GLUCOSE SERPL-MCNC: 71 MG/DL (ref 70–110)
HCT VFR BLD AUTO: 31.2 % (ref 37–48.5)
HGB BLD-MCNC: 9.8 G/DL (ref 12–16)
IMM GRANULOCYTES # BLD AUTO: 0.11 K/UL (ref 0–0.04)
IMM GRANULOCYTES NFR BLD AUTO: 1.6 % (ref 0–0.5)
LYMPHOCYTES # BLD AUTO: 2 K/UL (ref 1–4.8)
LYMPHOCYTES NFR BLD: 29.1 % (ref 18–48)
MCH RBC QN AUTO: 25.3 PG (ref 27–31)
MCHC RBC AUTO-ENTMCNC: 31.4 G/DL (ref 32–36)
MCV RBC AUTO: 81 FL (ref 82–98)
MONOCYTES # BLD AUTO: 0.4 K/UL (ref 0.3–1)
MONOCYTES NFR BLD: 5.9 % (ref 4–15)
NEUTROPHILS # BLD AUTO: 4.2 K/UL (ref 1.8–7.7)
NEUTROPHILS NFR BLD: 62 % (ref 38–73)
NRBC BLD-RTO: 0 /100 WBC
PLATELET # BLD AUTO: 316 K/UL (ref 150–450)
PMV BLD AUTO: 9.9 FL (ref 9.2–12.9)
POTASSIUM SERPL-SCNC: 4.2 MMOL/L (ref 3.5–5.1)
PROT SERPL-MCNC: 6.4 G/DL (ref 6–8.4)
RBC # BLD AUTO: 3.87 M/UL (ref 4–5.4)
SODIUM SERPL-SCNC: 138 MMOL/L (ref 136–145)
WBC # BLD AUTO: 6.83 K/UL (ref 3.9–12.7)

## 2024-02-10 PROCEDURE — 99284 EMERGENCY DEPT VISIT MOD MDM: CPT | Mod: 24,,, | Performed by: OBSTETRICS & GYNECOLOGY

## 2024-02-10 PROCEDURE — 85025 COMPLETE CBC W/AUTO DIFF WBC: CPT

## 2024-02-10 PROCEDURE — 25000003 PHARM REV CODE 250

## 2024-02-10 PROCEDURE — 99283 EMERGENCY DEPT VISIT LOW MDM: CPT

## 2024-02-10 PROCEDURE — 80053 COMPREHEN METABOLIC PANEL: CPT

## 2024-02-10 RX ORDER — NIFEDIPINE 30 MG/1
30 TABLET, EXTENDED RELEASE ORAL DAILY
Status: DISCONTINUED | OUTPATIENT
Start: 2024-02-10 | End: 2024-02-10

## 2024-02-10 RX ORDER — NIFEDIPINE 30 MG/1
30 TABLET, EXTENDED RELEASE ORAL ONCE
Status: COMPLETED | OUTPATIENT
Start: 2024-02-10 | End: 2024-02-10

## 2024-02-10 RX ORDER — NIFEDIPINE 30 MG/1
30 TABLET, EXTENDED RELEASE ORAL DAILY
Qty: 30 TABLET | Refills: 11 | Status: SHIPPED | OUTPATIENT
Start: 2024-02-10 | End: 2024-06-01

## 2024-02-10 RX ADMIN — NIFEDIPINE 30 MG: 30 TABLET, FILM COATED, EXTENDED RELEASE ORAL at 02:02

## 2024-02-10 NOTE — ED PROVIDER NOTES
Encounter Date: 2/10/2024       History     Chief Complaint   Patient presents with    Hypertension     HPI    Isabelle Anderson is a 33 y.o. [id number] PPD#5 s/p VAVD presents complaining of elevated BP.     Patient reports elevated BP at home (150-170s/90-100s). She also reports onset of headache this morning, mainly left frontal and behind her neck. She took tylenol and ibuprofen at home and reports improvement in HA. Currently 1/10, previously 5/10.    Denies vision changes, CP, SOB, or RUQ pain.    Labor course complicated by gHTN.    Review of patient's allergies indicates:   Allergen Reactions    Lamictal [lamotrigine] Rash    Codeine Nausea And Vomiting     Can take other narcotics with nausea meds    Latex, natural rubber Rash     Past Medical History:   Diagnosis Date    Infertility, female     PONV (postoperative nausea and vomiting)      Past Surgical History:   Procedure Laterality Date    DILATION AND CURETTAGE OF UTERUS USING SUCTION N/A 05/10/2021    Procedure: DILATION AND CURETTAGE, UTERUS, USING SUCTION;  Surgeon: Julie R. Jeansonne, MD;  Location: [facility];  Service: OB/GYN;  Laterality: N/A;    EYE SURGERY  2018    IVF Egg Retrieval  2023     Family History   Problem Relation Age of Onset    Diabetes Other      Social History     Tobacco Use    Smoking status: Never    Smokeless tobacco: Never   Substance Use Topics    Alcohol use: No    Drug use: No     Review of Systems   Constitutional:  Negative for chills and fever.   Eyes:  Negative for visual disturbance.   Respiratory:  Negative for shortness of breath.    Cardiovascular:  Negative for chest pain.   Gastrointestinal:  Negative for abdominal pain, nausea and vomiting.   Genitourinary:  Negative for dysuria and vaginal pain.   Neurological:  Positive for headaches. Negative for light-headedness.   Psychiatric/Behavioral:  Negative for confusion. The patient is not nervous/anxious.        Physical Exam     Initial Vitals [02/10/24  1105]   BP Pulse Resp Temp SpO2   (!) 153/92 (!) 59 17 98.1 °F (36.7 °C) 98 %      MAP       --         Physical Exam    Vitals reviewed.  Constitutional: She appears well-developed and well-nourished. She is not diaphoretic. No distress.   HENT:   Head: Normocephalic and atraumatic.   Eyes: Conjunctivae are normal.   Cardiovascular:  Normal rate.           Pulmonary/Chest: No respiratory distress.   Normal work of breathing   Abdominal: There is no abdominal tenderness. There is no rebound and no guarding.     Neurological: She is alert and oriented to person, place, and time.   Skin: Skin is warm and dry.   Psychiatric: She has a normal mood and affect. Her behavior is normal. Thought content normal.         ED Course   Procedures  Labs Reviewed   CBC W/ AUTO DIFFERENTIAL - Abnormal; Notable for the following components:       Result Value    RBC 3.87 (*)     Hemoglobin 9.8 (*)     Hematocrit 31.2 (*)     MCV 81 (*)     MCH 25.3 (*)     MCHC 31.4 (*)     Immature Granulocytes 1.6 (*)     Immature Grans (Abs) 0.11 (*)     All other components within normal limits   COMPREHENSIVE METABOLIC PANEL - Abnormal; Notable for the following components:    CO2 19 (*)     Albumin 2.9 (*)     Alkaline Phosphatase 145 (*)     AST 41 (*)     ALT 56 (*)     All other components within normal limits          Imaging Results    None          Medications   NIFEdipine 24 hr tablet 30 mg (30 mg Oral Given 2/10/24 1403)     Medical Decision Making  Elevated BP  - Asymptomatic  - Patient has had serial mild range BPs in [facility]  - CBC/CMP wnl  - Discussed home BP monitoring and pre-E warning signs   - Will send home with Procardia 30 XL daily. First dose given in [facility].  - Primary OB/GYN clinic notified. Will schedule her for follow-up accordingly.   - Patient stable for discharge at this time  - ED return precautions given  - She voiced understanding and is in agreement with the plan       Han Araujo MD PGY1  Obstetrics and  Gynecology      Amount and/or Complexity of Data Reviewed  Labs: ordered.    Risk  Prescription drug management.              Attending Attestation:   Physician Attestation Statement for Resident:  As the supervising MD   Physician Attestation Statement: I have personally seen and examined this patient.   I agree with the above history.  -:   As the supervising MD I agree with the above PE.     As the supervising MD I agree with the above treatment, course, plan, and disposition.   -: Pre-E labs normal.  HA relieved with ibuprofen.  Mild range Bps.  Will start procardiaXL 30mg.  Will f/u with OB in 1 week.  I was personally present during the critical portions of the procedure(s) performed by the resident and was immediately available in the ED to provide services and assistance as needed during the entire procedure.  I have reviewed and agree with the residents interpretation of the following: lab data.  I have reviewed the following: old records at this facility.                ED Course as of 02/16/24 1107   Sat Feb 10, 2024   1242 BP(!): 153/92 [CD]   1243 BP(!): 154/90 [CD]   1243 BP(!): 141/95 [CD]   1244 BP(!): 142/95 [CD]      ED Course User Index  [CD] Unique Love MD                           Clinical Impression:  Final diagnoses:  [R03.0] Elevated blood pressure reading (Primary)  [Z39.2] Postpartum state          ED Disposition Condition    Discharge Stable          ED Prescriptions       Medication Sig Dispense Start Date End Date Auth. Provider    NIFEdipine (PROCARDIA-XL) 30 MG (OSM) 24 hr tablet Take 1 tablet (30 mg total) by mouth once daily. 30 tablet 2/10/2024 2/9/2025 Han Araujo MD          Follow-up Information    None          Han Araujo MD  Resident  02/10/24 1631       Unique Love MD  02/16/24 1107

## 2024-02-10 NOTE — DISCHARGE INSTRUCTIONS
Please return to [facility] for the following:     - blood pressure persistent 160/110   - h/a, pain under the right breast, short of breath  - vaginal bleeding - soaking 1 pad per hour for 2 consecutive hours   - one-sided breast tenderness, warm to touch     Labor and Delivery: [phone] option 1 for any questions or concerns if after hours from Clinic.      MD stated okay to take next procardia 30mg dose in morning 2/11/24.

## 2024-02-14 ENCOUNTER — PATIENT MESSAGE (OUTPATIENT)
Dept: OBSTETRICS AND GYNECOLOGY | Facility: CLINIC | Age: 34
End: 2024-02-14
Payer: OTHER GOVERNMENT

## 2024-02-15 ENCOUNTER — LAB VISIT (OUTPATIENT)
Dept: LAB | Facility: OTHER | Age: 34
End: 2024-02-15
Attending: INTERNAL MEDICINE
Payer: OTHER GOVERNMENT

## 2024-02-15 ENCOUNTER — CLINICAL SUPPORT (OUTPATIENT)
Dept: OBSTETRICS AND GYNECOLOGY | Facility: CLINIC | Age: 34
End: 2024-02-15
Payer: OTHER GOVERNMENT

## 2024-02-15 VITALS — SYSTOLIC BLOOD PRESSURE: 132 MMHG | DIASTOLIC BLOOD PRESSURE: 84 MMHG

## 2024-02-15 DIAGNOSIS — R39.11 URINARY HESITANCY: Primary | ICD-10-CM

## 2024-02-15 DIAGNOSIS — Z01.30 BLOOD PRESSURE CHECK: Primary | ICD-10-CM

## 2024-02-15 DIAGNOSIS — R39.11 URINARY HESITANCY: ICD-10-CM

## 2024-02-15 LAB
BILIRUB UR QL STRIP: NEGATIVE
CLARITY UR: CLEAR
COLOR UR: YELLOW
GLUCOSE UR QL STRIP: NEGATIVE
HGB UR QL STRIP: ABNORMAL
KETONES UR QL STRIP: ABNORMAL
LEUKOCYTE ESTERASE UR QL STRIP: ABNORMAL
MICROSCOPIC COMMENT: ABNORMAL
NITRITE UR QL STRIP: NEGATIVE
PH UR STRIP: 6 [PH] (ref 5–8)
PROT UR QL STRIP: ABNORMAL
RBC #/AREA URNS HPF: 34 /HPF (ref 0–4)
SP GR UR STRIP: >1.03 (ref 1–1.03)
SQUAMOUS #/AREA URNS HPF: 2 /HPF
UNIDENT CRYS URNS QL MICRO: ABNORMAL
URN SPEC COLLECT METH UR: ABNORMAL
WBC #/AREA URNS HPF: 40 /HPF (ref 0–5)

## 2024-02-15 PROCEDURE — 81000 URINALYSIS NONAUTO W/SCOPE: CPT | Performed by: OBSTETRICS & GYNECOLOGY

## 2024-02-15 PROCEDURE — 87086 URINE CULTURE/COLONY COUNT: CPT | Performed by: OBSTETRICS & GYNECOLOGY

## 2024-02-15 PROCEDURE — 99999 PR PBB SHADOW E&M-EST. PATIENT-LVL II: CPT | Mod: PBBFAC,,,

## 2024-02-15 PROCEDURE — 99212 OFFICE O/P EST SF 10 MIN: CPT | Mod: PBBFAC

## 2024-02-15 PROCEDURE — 99499 UNLISTED E&M SERVICE: CPT | Mod: S$PBB,,,

## 2024-02-16 LAB — BACTERIA UR CULT: NO GROWTH

## 2024-02-23 ENCOUNTER — OFFICE VISIT (OUTPATIENT)
Dept: PSYCHIATRY | Facility: CLINIC | Age: 34
End: 2024-02-23
Payer: OTHER GOVERNMENT

## 2024-02-23 DIAGNOSIS — F41.1 GAD (GENERALIZED ANXIETY DISORDER): Primary | ICD-10-CM

## 2024-02-23 PROCEDURE — 99213 OFFICE O/P EST LOW 20 MIN: CPT | Mod: 95,,, | Performed by: INTERNAL MEDICINE

## 2024-02-23 RX ORDER — BUPROPION HYDROCHLORIDE 150 MG/1
150 TABLET ORAL DAILY
Qty: 90 TABLET | Refills: 3 | Status: SHIPPED | OUTPATIENT
Start: 2024-02-23 | End: 2024-06-01

## 2024-02-23 NOTE — PROGRESS NOTES
OUTPATIENT PSYCHIATRY RETURN VISIT    ENCOUNTER DATE:  2/23/24   SITE:  [facility]  LENGTH OF SESSION:  8 minutes    The patient location is:  [place], not in a healthcare facility  Visit type:  audiovisual    Face to Face time with patient:  8 minutes  12 minutes of total time spent on the encounter, which includes face to face time and non-face to face time preparing to see the patient (eg, review of tests), Obtaining and/or reviewing separately obtained history, Documenting clinical information in the electronic or other health record, Independently interpreting results (not separately reported) and communicating results to the patient/family/caregiver, or Care coordination (not separately reported).     Each patient to whom he or she provides medical services by telemedicine is:  (1) informed of the relationship between the physician and patient and the respective role of any other health care provider with respect to management of the patient; and (2) notified that he or she may decline to receive medical services by telemedicine and may withdraw from such care at any time.    CHIEF COMPLAINT:  Mood      HISTORY OF PRESENTING ILLNESS:  Isabelle Anderson is a 33 y.o. female with history of Anxiety who presents for follow up appointment.      Plan at last appointment on 11/30/2023:  Mood is currently well controlled.  Continue Wellbutrin XL 150mg daily.  She does plan to breastfeed.  [employer] Fact sheet sent to portal.  Discussed with patient informed consent, risks versus benefits, alternative treatments, side effect profile and the inherent unpredictability of individual responses to these treatments.  The patient expresses understanding of the above and displays the capacity to agree with this current plan.    History as told by patient:  21 hour labor, not easy, but both doing well now.  Definitely had the baby blues but feeling so much better the last 4-5 days.  Daughter  almost 3 weeks old now.  Getting up twice in the night.  She is pumping.  Combination of breast milk and formula.  Denies symptoms of anxiety at all.  Denies depression currently.  Occasionally feels overwhelmed if she can't get her down for nap or something but that passes quickly.   helping at night.  She pumps and makes bottles and  changes her and feeds her.  Getting into good routine.  On BP medication.  She is sleeping very well when baby sleeps.   goes back to work around March 1st.  She will be home for 2 months.  She goes around first of May and  will be home for 2 months.  Then mom keeping for 1 month and then  in August.      Medication side effects:  No  Medication compliance:  Yes    PSYCHIATRIC REVIEW OF SYSTEMS:  Trouble with sleep:  Denies  Appetite changes:  Denies  Weight changes:  Not discussed  Lack of energy:  Denies  Anhedonia:  Denies  Somatic symptoms:  Denies  Libido:  Not discussed  Anxiety/panic:  Denies  Guilty/hopeless:  Denies  Self-injurious behavior/risky behavior:  Denies  Any drugs:  Denies  Alcohol:  Denies  Breastfeeding:  Pumping and formula    MEDICAL REVIEW OF SYSTEMS:  Complete review of systems performed covering Constitutional, Musculoskeletal, Neurologic.  All systems negative except for that covered in HPI.    PAST PSYCHIATRIC, MEDICAL, AND SOCIAL HISTORY REVIEWED  The patient's past medical, family and social history have been reviewed and updated as appropriate within the electronic medical record - see encounter notes.    MEDICATIONS:    Current Outpatient Medications:     buPROPion (WELLBUTRIN XL) 150 MG TB24 tablet, Take 1 tablet (150 mg total) by mouth once daily., Disp: 90 tablet, Rfl: 3    docusate sodium (COLACE) 100 MG capsule, Take 1 capsule (100 mg total) by mouth 2 (two) times daily., Disp: 30 capsule, Rfl: 0    ferrous sulfate (FEOSOL) 325 mg (65 mg iron) Tab tablet, Take 1 tablet (325 mg total) by mouth daily with  "breakfast., Disp: 30 tablet, Rfl: 0    NIFEdipine (PROCARDIA-XL) 30 MG (OSM) 24 hr tablet, Take 1 tablet (30 mg total) by mouth once daily., Disp: 30 tablet, Rfl: 11    pantoprazole (PROTONIX) 20 MG tablet, Take 1 tablet (20 mg total) by mouth once daily., Disp: 30 tablet, Rfl: 1    ALLERGIES:  Review of patient's allergies indicates:   Allergen Reactions    Lamictal [lamotrigine] Rash    Codeine Nausea And Vomiting     Can take other narcotics with nausea meds    Latex, natural rubber Rash       PSYCHIATRIC EXAM:  There were no vitals filed for this visit.  Appearance:  Well groomed, appearing healthy and of stated age  Behavior:  Cooperative, pleasant, no psychomotor agitation or retardation  Speech:  Normal rate, rhythm, prosody, and volume  Mood:  "Good"  Affect:  Euthymic  Thought Process:  Linear, logical, goal directed  Thought Content:  Negative for suicidal ideation, homicidal ideation, delusions or hallucinations.  Associations:  Intact  Memory:  Grossly Intact  Level of Consciousness/Orientation:  Grossly intact  Fund of Knowledge:  Good  Attention:  Good  Language:  Fluent, able to name abstract and concrete objects  Insight:  Good  Judgment:  Intact  Psychomotor signs:  No involuntary movements of face  Gait:  Unable to assess via virtual visit      RELEVANT LABS/STUDIES:    Lab Results   Component Value Date    WBC 6.83 02/10/2024    HGB 9.8 (L) 02/10/2024    HCT 31.2 (L) 02/10/2024    MCV 81 (L) 02/10/2024     02/10/2024     BMP  Lab Results   Component Value Date     02/10/2024    K 4.2 02/10/2024     02/10/2024    CO2 19 (L) 02/10/2024    BUN 11 02/10/2024    CREATININE 0.8 02/10/2024    CALCIUM 9.2 02/10/2024    ANIONGAP 13 02/10/2024    ESTGFRAFRICA >60 07/25/2022    EGFRNONAA >60 07/25/2022     Lab Results   Component Value Date    ALT 56 (H) 02/10/2024    AST 41 (H) 02/10/2024    ALKPHOS 145 (H) 02/10/2024    BILITOT 0.3 02/10/2024     Lab Results   Component Value Date    TSH " "0.516 07/25/2022     No results found for: "LABA1C", "HGBA1C"    IMPRESSION:    Isabelle Anderson is a 33 y.o. female with history of Anxiety who presents for follow up appointment.    Status/Progress:  Based on the examination today, the patient's problem(s) is/are  stable .  New problems have not been presented today.    Risk Parameters:  Patient reports no suicidal ideation  Patient reports no homicidal ideation  Patient reports no self-injurious behavior  Patient reports no violent behavior      DIAGNOSES:    ICD-10-CM ICD-9-CM   1. LEBRON (generalized anxiety disorder)  F41.1 300.02       PLAN:  Mood is currently well controlled.  Continue Wellbutrin XL 150mg daily.  Discussed with patient informed consent, risks versus benefits, alternative treatments, side effect profile and the inherent unpredictability of individual responses to these treatments.  The patient expresses understanding of the above and displays the capacity to agree with this current plan.    RETURN TO CLINIC:  Follow up in about 3 months (around 5/23/2024). Or sooner if mood worsens.    "

## 2024-04-02 ENCOUNTER — POSTPARTUM VISIT (OUTPATIENT)
Dept: OBSTETRICS AND GYNECOLOGY | Facility: CLINIC | Age: 34
End: 2024-04-02
Payer: OTHER GOVERNMENT

## 2024-04-02 VITALS
BODY MASS INDEX: 28.57 KG/M2 | HEIGHT: 60 IN | WEIGHT: 145.5 LBS | DIASTOLIC BLOOD PRESSURE: 85 MMHG | SYSTOLIC BLOOD PRESSURE: 128 MMHG

## 2024-04-02 PROCEDURE — 99213 OFFICE O/P EST LOW 20 MIN: CPT | Mod: PBBFAC | Performed by: OBSTETRICS & GYNECOLOGY

## 2024-04-02 PROCEDURE — 99999 PR PBB SHADOW E&M-EST. PATIENT-LVL III: CPT | Mod: PBBFAC,,, | Performed by: OBSTETRICS & GYNECOLOGY

## 2024-04-02 PROCEDURE — 0503F POSTPARTUM CARE VISIT: CPT | Mod: ,,, | Performed by: OBSTETRICS & GYNECOLOGY

## 2024-04-02 RX ORDER — NORETHINDRONE ACETATE AND ETHINYL ESTRADIOL 1MG-20(24)
1 KIT ORAL DAILY
Qty: 90 TABLET | Refills: 3 | Status: SHIPPED | OUTPATIENT
Start: 2024-04-02 | End: 2025-04-02

## 2024-04-02 RX ORDER — NORETHINDRONE ACETATE AND ETHINYL ESTRADIOL, ETHINYL ESTRADIOL AND FERROUS FUMARATE 1MG-10(24)
1 KIT ORAL DAILY
Qty: 90 TABLET | Refills: 3 | Status: SHIPPED | OUTPATIENT
Start: 2024-04-02 | End: 2024-04-02

## 2024-04-02 NOTE — PROGRESS NOTES
Isabelle Anderson is a 33 y.o. female  presents for a postpartum visit.  She is status post VAVD  8 weeks ago.  Her hospitalization was complicated by GHTN, taking Procardia 30.  She is not breastfeeding.  She desires oral contraceptives (estrogen/progesterone) for contraception.  She denies postpartum depression. EPDS score is 3. She has resumed menses since delivery. She has not resumed intercourse since delivery.    Her last pap was , neg, HPV neg.     ROS: per HPI    Past Medical History:   Diagnosis Date    Infertility, female     PONV (postoperative nausea and vomiting)      Past Surgical History:   Procedure Laterality Date    DILATION AND CURETTAGE OF UTERUS USING SUCTION N/A 05/10/2021    Procedure: DILATION AND CURETTAGE, UTERUS, USING SUCTION;  Surgeon: Julie R. Jeansonne, MD;  Location: [facility];  Service: OB/GYN;  Laterality: N/A;    EYE SURGERY  2018    IVF Egg Retrieval  2023     Review of patient's allergies indicates:   Allergen Reactions    Lamictal [lamotrigine] Rash    Codeine Nausea And Vomiting     Can take other narcotics with nausea meds    Latex, natural rubber Rash       Current Outpatient Medications:     buPROPion (WELLBUTRIN XL) 150 MG TB24 tablet, Take 1 tablet (150 mg total) by mouth once daily., Disp: 90 tablet, Rfl: 3    NIFEdipine (PROCARDIA-XL) 30 MG (OSM) 24 hr tablet, Take 1 tablet (30 mg total) by mouth once daily., Disp: 30 tablet, Rfl: 11    docusate sodium (COLACE) 100 MG capsule, Take 1 capsule (100 mg total) by mouth 2 (two) times daily., Disp: 30 capsule, Rfl: 0    ferrous sulfate (FEOSOL) 325 mg (65 mg iron) Tab tablet, Take 1 tablet (325 mg total) by mouth daily with breakfast., Disp: 30 tablet, Rfl: 0    norethindrone-e.estradioL-iron (LO LOESTRIN FE) 1 mg-10 mcg (24)/10 mcg (2) Tab, Take 1 each by mouth once daily., Disp: 90 tablet, Rfl: 3    pantoprazole (PROTONIX) 20 MG tablet, Take 1 tablet (20 mg total) by mouth once daily., Disp: 30 tablet,  Rfl: 1      Vitals:    04/02/24 1046   BP: 128/85       GENERAL: healthy, alert, no distress, cooperative, smiling  RESP: nl effort  ABDOMEN: Normal, benign. and no masses, hepatosplenomegaly, no hernias  EXTERNAL GENITALIA POSTPARTUM: normal, well-healed, without lesions or masses   VAGINA POSTPARTUM: normal, well-healed, physiologic discharge, without lesions  PSYCH: nl affect    Patient consented to pelvic exam. Female chaperone present for exam.     Assessment:  Normal postpartum exam    -advised about recommendation to wait 18 months between delivery and conceiving another pregnancy if <35 and 12 months if greater than 35 years old.  -Pt cleared to return to normal activity, bathing, intercourse.   -counseled on birth control options.     Plan:  Routine follow up 3 months for annual exam.

## 2024-05-20 ENCOUNTER — OFFICE VISIT (OUTPATIENT)
Dept: PSYCHIATRY | Facility: CLINIC | Age: 34
End: 2024-05-20
Payer: OTHER GOVERNMENT

## 2024-05-20 ENCOUNTER — PATIENT MESSAGE (OUTPATIENT)
Dept: PSYCHIATRY | Facility: CLINIC | Age: 34
End: 2024-05-20

## 2024-05-20 DIAGNOSIS — F41.1 GAD (GENERALIZED ANXIETY DISORDER): Primary | ICD-10-CM

## 2024-05-20 PROCEDURE — 99213 OFFICE O/P EST LOW 20 MIN: CPT | Mod: 95,,, | Performed by: INTERNAL MEDICINE

## 2024-05-20 NOTE — PROGRESS NOTES
OUTPATIENT PSYCHIATRY RETURN VISIT    ENCOUNTER DATE:  5/20/24   SITE:  [facility], [facility]  LENGTH OF SESSION:  12 minutes    The patient location is:  [place], not in a healthcare facility  Visit type:  audiovisual    Face to Face time with patient:  12 minutes  16 minutes of total time spent on the encounter, which includes face to face time and non-face to face time preparing to see the patient (eg, review of tests), Obtaining and/or reviewing separately obtained history, Documenting clinical information in the electronic or other health record, Independently interpreting results (not separately reported) and communicating results to the patient/family/caregiver, or Care coordination (not separately reported).     Each patient to whom he or she provides medical services by telemedicine is:  (1) informed of the relationship between the physician and patient and the respective role of any other health care provider with respect to management of the patient; and (2) notified that he or she may decline to receive medical services by telemedicine and may withdraw from such care at any time.    CHIEF COMPLAINT:  Follow-up      HISTORY OF PRESENTING ILLNESS:  Isabelle Anderson is a 33 y.o. female with history of Anxiety who presents for follow up appointment.      Plan at last appointment on 11/30/2023:  Mood is currently well controlled.  Continue Wellbutrin XL 150mg daily.  She does plan to breastfeed.  [employer] Fact sheet sent to portal.  Discussed with patient informed consent, risks versus benefits, alternative treatments, side effect profile and the inherent unpredictability of individual responses to these treatments.  The patient expresses understanding of the above and displays the capacity to agree with this current plan.    History as told by patient:  Says she is doing well.  She is back at work but  is home with baby.  Patient having bad allergies right now and baby has  "had some too.   x 2 months, then her mother x 1 month, then  in August.  Going back to work has been good for her.  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  Being away all day and then stepping into that makes her better able to be present for her.   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  Because she is back on birth control now, she is wondering if she needs Wellbutrin.  In the past has only needed it when off birth control.  Mood has been good, not overwhelmed, not crying.  Every now and then gets very irritable.  Difficulty with a new  at work.  1st miscarriage was May 2021 and started Wellbutrin a few months after that.  Had so many side effects on Zoloft.  Tried Lamictal which she liked except she got rash all over her body.  Lexapro had side effects.  Prior to 2021, has had little "tics" - mess with fingernail, chew inside of mouth, playing with hair.  Mother said as a child she was difficult to punish because she was so sensitive.      Medication side effects:  No  Medication compliance:  Yes    PSYCHIATRIC REVIEW OF SYSTEMS:  Trouble with sleep:  Denies  Appetite changes:  Denies  Weight changes:  Not discussed  Lack of energy:  Denies  Anhedonia:  Denies  Somatic symptoms:  Denies  Libido:  Not discussed  Anxiety/panic:  Denies  Guilty/hopeless:  Denies  Self-injurious behavior/risky behavior:  Denies  Any drugs:  Denies  Alcohol:  Denies  Breastfeeding:  Pumping and formula    MEDICAL REVIEW OF SYSTEMS:  Complete review of systems performed covering Constitutional, Musculoskeletal, Neurologic.  All systems negative except for that covered in HPI.    PAST PSYCHIATRIC, MEDICAL, AND SOCIAL HISTORY REVIEWED  The patient's past medical, family and social history have been reviewed and updated as appropriate within the electronic medical record - see encounter notes.    MEDICATIONS:    Current Outpatient Medications:     norethindrone-e.estradioL-iron (BLISOVI " "24 FE) 1 mg-20 mcg (24)/75 mg (4) per tablet, Take 1 tablet by mouth once daily., Disp: 90 tablet, Rfl: 3    ALLERGIES:  Review of patient's allergies indicates:   Allergen Reactions    Lamictal [lamotrigine] Rash    Codeine Nausea And Vomiting     Can take other narcotics with nausea meds    Latex, natural rubber Rash       PSYCHIATRIC EXAM:  There were no vitals filed for this visit.  Appearance:  Well groomed, appearing healthy and of stated age  Behavior:  Cooperative, pleasant, no psychomotor agitation or retardation  Speech:  Normal rate, rhythm, prosody, and volume  Mood:  "Good"  Affect:  Euthymic  Thought Process:  Linear, logical, goal directed  Thought Content:  Negative for suicidal ideation, homicidal ideation, delusions or hallucinations.  Associations:  Intact  Memory:  Grossly Intact  Level of Consciousness/Orientation:  Grossly intact  Fund of Knowledge:  Good  Attention:  Good  Language:  Fluent, able to name abstract and concrete objects  Insight:  Good  Judgment:  Intact  Psychomotor signs:  No involuntary movements of face  Gait:  Unable to assess via virtual visit      RELEVANT LABS/STUDIES:    Lab Results   Component Value Date    WBC 6.83 02/10/2024    HGB 9.8 (L) 02/10/2024    HCT 31.2 (L) 02/10/2024    MCV 81 (L) 02/10/2024     02/10/2024     BMP  Lab Results   Component Value Date     02/10/2024    K 4.2 02/10/2024     02/10/2024    CO2 19 (L) 02/10/2024    BUN 11 02/10/2024    CREATININE 0.8 02/10/2024    CALCIUM 9.2 02/10/2024    ANIONGAP 13 02/10/2024    ESTGFRAFRICA >60 07/25/2022    EGFRNONAA >60 07/25/2022     Lab Results   Component Value Date    ALT 56 (H) 02/10/2024    AST 41 (H) 02/10/2024    ALKPHOS 145 (H) 02/10/2024    BILITOT 0.3 02/10/2024     Lab Results   Component Value Date    TSH 0.516 07/25/2022     No results found for: "LABA1C", "HGBA1C"    IMPRESSION:    Isabelle Anderson is a 33 y.o. female with history of Anxiety who presents for follow up " appointment.    Status/Progress:  Based on the examination today, the patient's problem(s) is/are  stable .  New problems have not been presented today.    Risk Parameters:  Patient reports no suicidal ideation  Patient reports no homicidal ideation  Patient reports no self-injurious behavior  Patient reports no violent behavior      DIAGNOSES:    ICD-10-CM ICD-9-CM   1. LEBRON (generalized anxiety disorder)  F41.1 300.02         PLAN:  Patient would like to try stopping Wellbutrin.  Will monitor mood closely.    Discussed with patient informed consent, risks versus benefits, alternative treatments, side effect profile and the inherent unpredictability of individual responses to these treatments.  The patient expresses understanding of the above and displays the capacity to agree with this current plan.    RETURN TO CLINIC:  Follow up in about 3 months (around 8/20/2024).

## 2024-06-02 ENCOUNTER — PATIENT MESSAGE (OUTPATIENT)
Dept: OBSTETRICS AND GYNECOLOGY | Facility: CLINIC | Age: 34
End: 2024-06-02
Payer: OTHER GOVERNMENT

## 2024-06-03 RX ORDER — DROSPIRENONE AND ETHINYL ESTRADIOL 0.03MG-3MG
1 KIT ORAL DAILY
Qty: 90 TABLET | Refills: 3 | Status: SHIPPED | OUTPATIENT
Start: 2024-06-03 | End: 2025-06-03

## 2024-07-09 ENCOUNTER — OFFICE VISIT (OUTPATIENT)
Dept: PSYCHIATRY | Facility: CLINIC | Age: 34
End: 2024-07-09
Payer: OTHER GOVERNMENT

## 2024-07-09 DIAGNOSIS — F41.1 GAD (GENERALIZED ANXIETY DISORDER): Primary | ICD-10-CM

## 2024-07-09 PROCEDURE — 99213 OFFICE O/P EST LOW 20 MIN: CPT | Mod: 95,,, | Performed by: INTERNAL MEDICINE

## 2024-07-09 NOTE — PROGRESS NOTES
OUTPATIENT PSYCHIATRY RETURN VISIT    ENCOUNTER DATE:  7/9/24   SITE:  [facility], [facility]  LENGTH OF SESSION:  5 minutes    The patient location is:  [place], not in a healthcare facility  Visit type:  audiovisual    Face to Face time with patient:  5 minutes  10 minutes of total time spent on the encounter, which includes face to face time and non-face to face time preparing to see the patient (eg, review of tests), Obtaining and/or reviewing separately obtained history, Documenting clinical information in the electronic or other health record, Independently interpreting results (not separately reported) and communicating results to the patient/family/caregiver, or Care coordination (not separately reported).     Each patient to whom he or she provides medical services by telemedicine is:  (1) informed of the relationship between the physician and patient and the respective role of any other health care provider with respect to management of the patient; and (2) notified that he or she may decline to receive medical services by telemedicine and may withdraw from such care at any time.    CHIEF COMPLAINT:  Mood      HISTORY OF PRESENTING ILLNESS:  Isabelle Anderson is a 33 y.o. female with history of Anxiety who presents for follow up appointment.      Plan at last appointment on 5/20/2024:  Patient would like to try stopping Wellbutrin.  Will monitor mood closely.    Discussed with patient informed consent, risks versus benefits, alternative treatments, side effect profile and the inherent unpredictability of individual responses to these treatments.  The patient expresses understanding of the above and displays the capacity to agree with this current plan.    History as told by patient:  Says she is doing pretty well off of the Wellbutrin.  Asked  too - they both think she doesn't need to be back on it.  When she does get emotional, it is something small - song about a child.  Not  "depressed emotional at all.   leaves for [place] for 16 days and then gets back and will be on nights.  Mother taking care of baby now.  She being back on  birth She is now 5 months and doing very well.      Medication side effects:  No  Medication compliance:  Yes    PSYCHIATRIC REVIEW OF SYSTEMS:  Trouble with sleep:  Denies  Appetite changes:  Denies  Weight changes:  Not discussed  Lack of energy:  Denies  Anhedonia:  Denies  Somatic symptoms:  Denies  Libido:  Not discussed  Anxiety/panic:  Denies  Guilty/hopeless:  Denies  Self-injurious behavior/risky behavior:  Denies  Any drugs:  Denies  Alcohol:  Denies  Breastfeeding:  Denies    MEDICAL REVIEW OF SYSTEMS:  Complete review of systems performed covering Constitutional, Musculoskeletal, Neurologic.  All systems negative except for that covered in HPI.    PAST PSYCHIATRIC, MEDICAL, AND SOCIAL HISTORY REVIEWED  The patient's past medical, family and social history have been reviewed and updated as appropriate within the electronic medical record - see encounter notes.    MEDICATIONS:    Current Outpatient Medications:     drospirenone-ethinyl estradioL (GONZALES, 28,) 3-0.03 mg per tablet, Take 1 tablet by mouth once daily., Disp: 90 tablet, Rfl: 3    norethindrone-e.estradioL-iron (BLISOVI 24 FE) 1 mg-20 mcg (24)/75 mg (4) per tablet, Take 1 tablet by mouth once daily., Disp: 90 tablet, Rfl: 3    ALLERGIES:  Review of patient's allergies indicates:   Allergen Reactions    Lamictal [lamotrigine] Rash    Codeine Nausea And Vomiting     Can take other narcotics with nausea meds    Latex, natural rubber Rash       PSYCHIATRIC EXAM:  There were no vitals filed for this visit.  Appearance:  Well groomed, appearing healthy and of stated age  Behavior:  Cooperative, pleasant, no psychomotor agitation or retardation  Speech:  Normal rate, rhythm, prosody, and volume  Mood:  "Good"  Affect:  Euthymic  Thought Process:  Linear, logical, goal " "directed  Thought Content:  Negative for suicidal ideation, homicidal ideation, delusions or hallucinations.  Associations:  Intact  Memory:  Grossly Intact  Level of Consciousness/Orientation:  Grossly intact  Fund of Knowledge:  Good  Attention:  Good  Language:  Fluent, able to name abstract and concrete objects  Insight:  Good  Judgment:  Intact  Psychomotor signs:  No involuntary movements of face  Gait:  Unable to assess via virtual visit      RELEVANT LABS/STUDIES:    Lab Results   Component Value Date    WBC 6.83 02/10/2024    HGB 9.8 (L) 02/10/2024    HCT 31.2 (L) 02/10/2024    MCV 81 (L) 02/10/2024     02/10/2024     BMP  Lab Results   Component Value Date     02/10/2024    K 4.2 02/10/2024     02/10/2024    CO2 19 (L) 02/10/2024    BUN 11 02/10/2024    CREATININE 0.8 02/10/2024    CALCIUM 9.2 02/10/2024    ANIONGAP 13 02/10/2024    ESTGFRAFRICA >60 07/25/2022    EGFRNONAA >60 07/25/2022     Lab Results   Component Value Date    ALT 56 (H) 02/10/2024    AST 41 (H) 02/10/2024    ALKPHOS 145 (H) 02/10/2024    BILITOT 0.3 02/10/2024     Lab Results   Component Value Date    TSH 0.516 07/25/2022     No results found for: "LABA1C", "HGBA1C"    IMPRESSION:    Isabelle Anderson is a 33 y.o. female with history of Anxiety who presents for follow up appointment.    Status/Progress:  Based on the examination today, the patient's problem(s) is/are  stable .  New problems have not been presented today.    Risk Parameters:  Patient reports no suicidal ideation  Patient reports no homicidal ideation  Patient reports no self-injurious behavior  Patient reports no violent behavior      DIAGNOSES:    ICD-10-CM ICD-9-CM   1. LEBRON (generalized anxiety disorder)  F41.1 300.02       PLAN:  Patient is doing well off of Wellbutrin.  She will reach out to me if this changes.    Discussed with patient informed consent, risks versus benefits, alternative treatments, side effect profile and the inherent " unpredictability of individual responses to these treatments.  The patient expresses understanding of the above and displays the capacity to agree with this current plan.    RETURN TO CLINIC:  Follow up if symptoms worsen or fail to improve.

## 2024-07-11 ENCOUNTER — OFFICE VISIT (OUTPATIENT)
Dept: OBSTETRICS AND GYNECOLOGY | Facility: CLINIC | Age: 34
End: 2024-07-11
Payer: OTHER GOVERNMENT

## 2024-07-11 VITALS
HEIGHT: 60 IN | SYSTOLIC BLOOD PRESSURE: 108 MMHG | WEIGHT: 145.31 LBS | DIASTOLIC BLOOD PRESSURE: 70 MMHG | BODY MASS INDEX: 28.53 KG/M2

## 2024-07-11 DIAGNOSIS — N94.10 DYSPAREUNIA IN FEMALE: ICD-10-CM

## 2024-07-11 DIAGNOSIS — Z01.419 ENCOUNTER FOR ANNUAL ROUTINE GYNECOLOGICAL EXAMINATION: Primary | ICD-10-CM

## 2024-07-11 PROCEDURE — 99459 PELVIC EXAMINATION: CPT | Mod: PBBFAC | Performed by: OBSTETRICS & GYNECOLOGY

## 2024-07-11 PROCEDURE — 99999 PR PBB SHADOW E&M-EST. PATIENT-LVL III: CPT | Mod: PBBFAC,,, | Performed by: OBSTETRICS & GYNECOLOGY

## 2024-07-11 PROCEDURE — 99213 OFFICE O/P EST LOW 20 MIN: CPT | Mod: PBBFAC | Performed by: OBSTETRICS & GYNECOLOGY

## 2024-07-11 RX ORDER — ESTRADIOL 0.1 MG/G
1 CREAM VAGINAL
Qty: 42.5 G | Refills: 1 | Status: SHIPPED | OUTPATIENT
Start: 2024-07-11 | End: 2025-07-11

## 2024-07-11 RX ORDER — DROSPIRENONE AND ETHINYL ESTRADIOL 0.03MG-3MG
1 KIT ORAL DAILY
Qty: 90 TABLET | Refills: 3 | Status: SHIPPED | OUTPATIENT
Start: 2024-07-11 | End: 2025-07-11

## 2024-07-11 NOTE — PROGRESS NOTES
SUBJECTIVE:     Chief Complaint: Well Woman       History of Present Illness:  Annual Exam  Patient presents for annual exam.   She c/o some dyspareunia in area of laceration repair still today.  LMP: 24  She denies any vd, vb, dyspareunia, dysuria, depression, anxiety.  Last pap was in  and was neg. HPV neg, due   Birth Control: gonzales joshua  declines STD testing.     GYN screening history: EVETTE 1 colpo   Mammogram history: na  Colonoscopy history: na  Dexa history: na     FH:   Breast cancer: none  Colon cancer: none  Ovarian cancer: none    Review of patient's allergies indicates:   Allergen Reactions    Lamictal [lamotrigine] Rash    Codeine Nausea And Vomiting     Can take other narcotics with nausea meds    Latex, natural rubber Rash       Past Medical History:   Diagnosis Date    Infertility, female     PONV (postoperative nausea and vomiting)      Past Surgical History:   Procedure Laterality Date    DILATION AND CURETTAGE OF UTERUS USING SUCTION N/A 05/10/2021    Procedure: DILATION AND CURETTAGE, UTERUS, USING SUCTION;  Surgeon: Julie R. Jeansonne, MD;  Location: [facility];  Service: OB/GYN;  Laterality: N/A;    EYE SURGERY  2018    IVF Egg Retrieval  2023     OB History          3    Para   1    Term   1            AB   2    Living   1         SAB   2    IAB        Ectopic        Multiple   0    Live Births   1           Obstetric Comments   Gynhx: 10/reg.  OCP -   Denies std,   Denies abnl, Pap 2016 NEG  S/p gardasil             Family History   Problem Relation Name Age of Onset    Diabetes Other       Social History     Tobacco Use    Smoking status: Never    Smokeless tobacco: Never   Substance Use Topics    Alcohol use: No    Drug use: No       Current Outpatient Medications   Medication Sig    drospirenone-ethinyl estradioL (GONZALES, 28,) 3-0.03 mg per tablet Take 1 tablet by mouth once daily.    estradioL (ESTRACE) 0.01 % (0.1 mg/gram) vaginal cream Place 1 g  vaginally twice a week.     No current facility-administered medications for this visit.       Review of Systems:  GENERAL: No fever, chills, fatigability or weight loss.  CARDIOVASCULAR: No chest pain. No palpitations.  RESPIRATORY: No SOB, no wheezing.  BREAST: Denies pain. No lumps. No discharge.  VULVAR: No pain, no lesions and no itching.  VAGINAL: No relaxation, no itching, no discharge, no abnormal bleeding and no lesions.  ABDOMEN: No abdominal pain. Denies nausea. Denies vomiting. No diarrhea. No constipation  URINARY: No incontinence, no nocturia, no frequency and no dysuria.  NEUROLOGICAL: No headaches. No vision changes.       OBJECTIVE:     Vitals:    07/11/24 0912   BP: 108/70       Patient verbally consents to pelvic and breast exams. Female chaperone present for exams.   Physical Exam:  Gen: NAD, well developed, well-nourished  HEENT: Normocephalic, atraumatic  Eyes: EOM nl, conjuntivae normal  Neck: ROM normal, no thyromegaly  Respiratory: Effort normal   Abd: soft, nontender, no masses palpated  Breast: Normal bilaterally, no masses, lesions or tenderness. No nipple discharge on expression, no lymphadenopathy bilaterally.  SSE:  Vulva: no lesions or rashes  Cervix: No lesions noted, nonfriable, no vaginal discharge or vaginal bleeding noted  BME:   Cervix: No CMT  Adnexa: nl bilaterally, no masses or fullness palpated  Uterus: normal, nonenlarged  Musculoskeletal: normal ROM  Neuro: alert, AAOx3  Skin: warm and dry  Psych: mood/affect nl, behavior normal, judgement normal, thought content normal        ASSESSMENT:       ICD-10-CM ICD-9-CM    1. Encounter for annual routine gynecological examination  Z01.419 V72.31       2. Dyspareunia in female  N94.10 625.0              Plan:      Isabelle was seen today for well woman.    Diagnoses and all orders for this visit:    Encounter for annual routine gynecological examination    Dyspareunia in female    Other orders  -     drospirenone-ethinyl estradioL  (GONZALES, 28,) 3-0.03 mg per tablet; Take 1 tablet by mouth once daily.  -     estradioL (ESTRACE) 0.01 % (0.1 mg/gram) vaginal cream; Place 1 g vaginally twice a week.        No orders of the defined types were placed in this encounter.    Estrace to help with vaginal healing after delivery and advised to use dilators to help as well.   Follow up in one year for annual, or prn.    Julie R Jeansonne

## 2024-12-02 ENCOUNTER — PATIENT MESSAGE (OUTPATIENT)
Dept: OBSTETRICS AND GYNECOLOGY | Facility: CLINIC | Age: 34
End: 2024-12-02
Payer: OTHER GOVERNMENT

## 2025-01-13 PROBLEM — Z37.9 VACUUM-ASSISTED VAGINAL DELIVERY: Status: RESOLVED | Noted: 2024-02-05 | Resolved: 2025-01-13

## 2025-04-04 ENCOUNTER — ON-DEMAND VIRTUAL (OUTPATIENT)
Dept: URGENT CARE | Facility: CLINIC | Age: 35
End: 2025-04-04

## 2025-04-04 DIAGNOSIS — K52.9 GASTROENTERITIS: Primary | ICD-10-CM

## 2025-04-04 RX ORDER — ONDANSETRON 4 MG/1
4 TABLET, ORALLY DISINTEGRATING ORAL EVERY 8 HOURS PRN
Qty: 15 TABLET | Refills: 0 | Status: SHIPPED | OUTPATIENT
Start: 2025-04-04 | End: 2025-04-09

## 2025-04-04 RX ORDER — AZITHROMYCIN 500 MG/1
500 TABLET, FILM COATED ORAL DAILY
Qty: 3 TABLET | Refills: 0 | Status: SHIPPED | OUTPATIENT
Start: 2025-04-04 | End: 2025-04-07

## 2025-04-04 NOTE — PROGRESS NOTES
Subjective:      Patient ID: Isabelle Anderson is a 34 y.o. female.    Vitals:  vitals were not taken for this visit.     Chief Complaint: GI Problem      Visit Type: TELE AUDIOVISUAL    Patient Location: Home     Present with the patient at the time of consultation: TELEMED PRESENT WITH PATIENT: None    Past Medical History:   Diagnosis Date    Infertility, female     PONV (postoperative nausea and vomiting)      Past Surgical History:   Procedure Laterality Date    DILATION AND CURETTAGE OF UTERUS USING SUCTION N/A 05/10/2021    Procedure: DILATION AND CURETTAGE, UTERUS, USING SUCTION;  Surgeon: Julie R. Jeansonne, MD;  Location: [facility];  Service: OB/GYN;  Laterality: N/A;    EYE SURGERY  05/2018    IVF Egg Retrieval  03/11/2023     Review of patient's allergies indicates:   Allergen Reactions    Lamictal [lamotrigine] Rash    Codeine Nausea And Vomiting     Can take other narcotics with nausea meds    Latex, natural rubber Rash     Medications Ordered Prior to Encounter[1]  Family History   Problem Relation Name Age of Onset    Diabetes Other         Medications Ordered                [id number] - [place] - [address] AT [facility] [address] [place] [address]    Telephone: [phone]   Fax: [phone]   Hours: Not open 24 hours                         E-Prescribed (2 of 2)              azithromycin (ZITHROMAX) 500 MG tablet    Sig: Take 1 tablet (500 mg total) by mouth once daily. for 3 days       Start: 4/4/25     Quantity: 3 tablet Refills: 0                         ondansetron (ZOFRAN-ODT) 4 MG TbDL    Sig: Take 1 tablet (4 mg total) by mouth every 8 (eight) hours as needed (nausea).       Start: 4/4/25     Quantity: 15 tablet Refills: 0                           Ohs Peq Odvv Intake    4/4/2025  9:22 AM CDT - Filed by Patient   What is your current physical address in the event of a medical emergency? [address] [place]  [address]   Are you able to take your vital signs? Yes   Systolic Blood Pressure:    Diastolic Blood Pressure:    Weight:    Height:    Pulse:    Temperature:    Respiration rate:    Pulse Oxygen:    Please attach any relevant images or files    Is your employer contracted with [facility]? No         Diarrhea for four days. Watery stool every 30 min. No blood or mucus. No fever. Vomited once and continues to have nausea.     GI Problem  The primary symptoms include nausea, vomiting and diarrhea. Primary symptoms do not include fever or hematochezia.   The illness does not include constipation.       Constitution: Negative for fever.   HENT: Negative.     Respiratory: Negative.     Gastrointestinal:  Positive for nausea, vomiting and diarrhea. Negative for constipation and bright red blood in stool.        Objective:   The physical exam was conducted virtually.  Physical Exam   Abdominal: Normal appearance.   Neurological: She is alert.       Assessment:     1. Gastroenteritis        Plan:       Gastroenteritis    Other orders  -     ondansetron (ZOFRAN-ODT) 4 MG TbDL; Take 1 tablet (4 mg total) by mouth every 8 (eight) hours as needed (nausea).  Dispense: 15 tablet; Refill: 0  -     azithromycin (ZITHROMAX) 500 MG tablet; Take 1 tablet (500 mg total) by mouth once daily. for 3 days  Dispense: 3 tablet; Refill: 0                          [1]   Current Outpatient Medications on File Prior to Visit   Medication Sig Dispense Refill    drospirenone-ethinyl estradioL (GONZALES, 28,) 3-0.03 mg per tablet Take 1 tablet by mouth once daily. 90 tablet 3    estradioL (ESTRACE) 0.01 % (0.1 mg/gram) vaginal cream Place 1 g vaginally twice a week. 42.5 g 1     No current facility-administered medications on file prior to visit.

## 2025-04-04 NOTE — PATIENT INSTRUCTIONS
Stay Hydrated: Use electrolyte-rich fluids such as Gatorade, Pedialyte, coconut water, or rehydration packets to help replenish lost fluids and electrolytes. These fluids are more effective at rehydration compared to plain water or vitamin water.  Increase Clear Liquids: Consume clear liquids such as water, Gatorade, Pedialyte, broths, and jello to maintain hydration. It's advisable to hold off on solid foods for 12-18 hours and then gradually advance to the BRAT diet (banana, rice, applesauce, tea, toast/crackers), followed by further food advancement as tolerated.  Use of Pepto-Bismol: Pepto-Bismol can help alleviate symptoms of diarrhea. However, it's important to use it as directed and be aware of any potential side effects. Avoid using Imodium (loperamide) for diarrhea relief.  These recommendations aim to address symptoms of diarrhea and dehydration while providing guidance on fluid and diet management. If symptoms persist or worsen, it's advisable to seek medical attention for further evaluation and treatment. Additionally, it's essential to follow any specific instructions provided by a healthcare professional based on individual health conditions and circumstances.     Thank you for choosing [facility]!    Our goal in the [facility] to always provide outstanding medical care. You may receive a survey by mail or e-mail in the next week regarding your experience today. We would greatly appreciate you completing and returning the survey. Your feedback provides us with a way to recognize our staff who provide very good care, and it helps us learn how to improve when your experience was below our aspiration of excellence.         We appreciate you trusting us with your medical care. We hope you feel better soon. We will be happy to take care of you for all of your future medical needs.    You must understand that you've received Virtual  treatment only and that you may be released before  all your medical problems are known or treated. You, the patient, will arrange for follow up care as instructed.    Follow up with your PCP or specialty clinic as directed in the next 1-2 weeks if not improved or as needed.  You can call [phone] to schedule an appointment with the appropriate provider.    If your condition worsens we recommend that you receive another evaluation in person, with your primary care provider, urgent care or at the emergency room immediately or contact your primary medical clinics after hours call service to discuss your concerns.

## 2025-04-08 DIAGNOSIS — K21.9 ESOPHAGEAL REFLUX: Primary | ICD-10-CM

## 2025-04-08 DIAGNOSIS — R94.31 ABNORMAL EKG: Primary | ICD-10-CM

## 2025-05-16 ENCOUNTER — PATIENT MESSAGE (OUTPATIENT)
Dept: OBSTETRICS AND GYNECOLOGY | Facility: CLINIC | Age: 35
End: 2025-05-16
Payer: OTHER GOVERNMENT

## 2025-06-24 ENCOUNTER — PATIENT MESSAGE (OUTPATIENT)
Dept: PSYCHIATRY | Facility: CLINIC | Age: 35
End: 2025-06-24
Payer: OTHER GOVERNMENT

## 2025-07-03 ENCOUNTER — OFFICE VISIT (OUTPATIENT)
Dept: PSYCHIATRY | Facility: CLINIC | Age: 35
End: 2025-07-03
Payer: OTHER GOVERNMENT

## 2025-07-03 DIAGNOSIS — F41.1 GAD (GENERALIZED ANXIETY DISORDER): Primary | ICD-10-CM

## 2025-07-03 PROCEDURE — 90833 PSYTX W PT W E/M 30 MIN: CPT | Mod: 95,,, | Performed by: INTERNAL MEDICINE

## 2025-07-03 PROCEDURE — 98006 SYNCH AUDIO-VIDEO EST MOD 30: CPT | Mod: 95,,, | Performed by: INTERNAL MEDICINE

## 2025-07-03 RX ORDER — VENLAFAXINE HYDROCHLORIDE 37.5 MG/1
37.5 CAPSULE, EXTENDED RELEASE ORAL DAILY
Qty: 30 CAPSULE | Refills: 0 | Status: SHIPPED | OUTPATIENT
Start: 2025-07-03 | End: 2025-07-14

## 2025-07-03 NOTE — PROGRESS NOTES
OUTPATIENT PSYCHIATRY RETURN VISIT    ENCOUNTER DATE:  7/3/25   SITE:  [facility], [facility]  LENGTH OF SESSION:  28 minutes    The patient location is:  [place], not in a healthcare facility  Visit type:  audiovisual    Face to Face time with patient:  28 minutes  35 minutes of total time spent on the encounter, which includes face to face time and non-face to face time preparing to see the patient (eg, review of tests), Obtaining and/or reviewing separately obtained history, Documenting clinical information in the electronic or other health record, Independently interpreting results (not separately reported) and communicating results to the patient/family/caregiver, or Care coordination (not separately reported).     Each patient to whom he or she provides medical services by telemedicine is:  (1) informed of the relationship between the physician and patient and the respective role of any other health care provider with respect to management of the patient; and (2) notified that he or she may decline to receive medical services by telemedicine and may withdraw from such care at any time.    CHIEF COMPLAINT:  Anxiety      HISTORY OF PRESENTING ILLNESS:  Isabelle Anderson is a 34 y.o. female with history of Anxiety who presents for follow up appointment.      Plan at last appointment on 7/9/2024:  Patient is doing well off of Wellbutrin.  She will reach out to me if this changes.    Discussed with patient informed consent, risks versus benefits, alternative treatments, side effect profile and the inherent unpredictability of individual responses to these treatments.  The patient expresses understanding of the above and displays the capacity to agree with this current plan.    History as told by patient:  Was talking to her mom who said she has always chewed her check and picked at her nails.  Realizes she has always had anxiety but now it has led to stress at work.  Started wondering if she  "needed to change her field or offices, but doesn't think it is that.  Recently has written down things she has noticed but not new: anxiety, very irritable/snippy, hyperfixates on something ( calls it her "isms") - fixates on finding the right car, or even tupperware, that doesn't help her stay focused, brain feels like scrambled eggs, memory worse, "squirly thoughts" - conversation starts in head but what comes out is 3 sentences later, bouts of impulsive spending and then returns it, issues with food noise.  If something is there and she likes it has to eat it - food in general, more sweets since baby.  Has gained weight.  Lost 40 pounds after birth of daughter.  138 up to 158 after delivery.  When Wellbutrin was started by Dr. Ruiz she had trouble getting motivated, would move from bed to couch.  Had food noise when on Wellbutrin too.  Starting Tanesha.  Used to go through bouts of insomnia - would go without sleep for awhile but never decreased need for sleep.  Denies other symptoms of deb.  Denies symptoms of depression.    Psychotherapy:  Target symptoms: anxiety   Why chosen therapy is appropriate versus another modality: relevant to diagnosis, patient responds to this modality  Outcome monitoring methods: self-report, observation  Therapeutic intervention type: supportive psychotherapy  Topics discussed/themes: building skills sets for symptom management, symptom recognition  The patient's response to the intervention is accepting. The patient's progress toward treatment goals is good.   Duration of intervention: 18 minutes.    Previous medication trials:  Zoloft (restless leg, dizzy, zombie), Lexapro (drowsy, restless leg, jittery), Lamictal (felt good on it but after 2-3 months got rash and lip swelling)     Medication side effects:  No  Medication compliance:  Yes    PSYCHIATRIC REVIEW OF SYSTEMS:  Trouble with sleep:  Denies  Appetite changes:  As above  Weight changes:  Gain  Lack of energy:  " "Denies  Anhedonia:  Denies  Somatic symptoms:  Denies  Libido:  Not discussed  Anxiety/panic:  Yes as above  Guilty/hopeless:  Denies  Self-injurious behavior/risky behavior:  Denies  Any drugs:  Denies  Alcohol:  Denies  Breastfeeding:  Denies    MEDICAL REVIEW OF SYSTEMS:  Complete review of systems performed covering Constitutional, Musculoskeletal, Neurologic.  All systems negative except for that covered in HPI.    PAST PSYCHIATRIC, MEDICAL, AND SOCIAL HISTORY REVIEWED  The patient's past medical, family and social history have been reviewed and updated as appropriate within the electronic medical record - see encounter notes.    MEDICATIONS:    Current Outpatient Medications:     buPROPion (WELLBUTRIN XL) 150 MG TB24 tablet, Take 1 tablet (150 mg total) by mouth once daily., Disp: 30 tablet, Rfl: 2    ALLERGIES:  Review of patient's allergies indicates:   Allergen Reactions    Lamictal [lamotrigine] Rash    Codeine Nausea And Vomiting     Can take other narcotics with nausea meds    Latex, natural rubber Rash       PSYCHIATRIC EXAM:  There were no vitals filed for this visit.  Appearance:  Well groomed, appearing healthy and of stated age  Behavior:  Cooperative, pleasant, no psychomotor agitation or retardation  Speech:  Normal rate, rhythm, prosody, and volume  Mood:  "Not good"  Affect:  Euthymic  Thought Process:  Linear, logical, goal directed  Thought Content:  Negative for suicidal ideation, homicidal ideation, delusions or hallucinations.  Associations:  Intact  Memory:  Grossly Intact  Level of Consciousness/Orientation:  Grossly intact  Fund of Knowledge:  Good  Attention:  Good  Language:  Fluent, able to name abstract and concrete objects  Insight:  Good  Judgment:  Intact  Psychomotor signs:  No involuntary movements of face  Gait:  Unable to assess via virtual visit      RELEVANT LABS/STUDIES:    Lab Results   Component Value Date    WBC 6.83 02/10/2024    HGB 9.8 (L) 02/10/2024    HCT 31.2 (L) " "02/10/2024    MCV 81 (L) 02/10/2024     02/10/2024     BMP  Lab Results   Component Value Date     11/19/2024    K 4.8 11/19/2024     02/10/2024    CO2 26 11/19/2024    BUN 13 11/19/2024    CREATININE 0.83 11/19/2024    CALCIUM 9.2 11/19/2024    ANIONGAP 13 02/10/2024    ESTGFRAFRICA >60 07/25/2022    EGFRNONAA >60 07/25/2022     Lab Results   Component Value Date    ALT 11 11/19/2024    AST 15 11/19/2024    ALKPHOS 43 11/19/2024    BILITOT 0.4 11/19/2024     Lab Results   Component Value Date    TSH 0.516 07/25/2022     No results found for: "LABA1C", "HGBA1C"    IMPRESSION:    Isabelle Anderson is a 34 y.o. female with history of Anxiety who presents for follow up appointment.    Status/Progress:  Based on the examination today, the patient's problem(s) is/are inadequately controlled.  New problems have not been presented today.    Risk Parameters:  Patient reports no suicidal ideation  Patient reports no homicidal ideation  Patient reports no self-injurious behavior  Patient reports no violent behavior      DIAGNOSES:    ICD-10-CM ICD-9-CM   1. LEBRON (generalized anxiety disorder)  F41.1 300.02       PLAN:  Patient with worsening anxiety recently.  Discussed options of 1. Trying Effexor since she has not tolerated SSRI's in the past but has never tried an SSRI, or 2. Restarting Wellbutrin since it was previously helpful.  She would like to try low dose Effexor.  Start Effexor 37.5mg daily.  *Addendum: Patient sent message on 7/7 stating she would rather restart Wellbutrin XL 150mg daily than start a new medication.    Discussed with patient informed consent, risks versus benefits, alternative treatments, side effect profile and the inherent unpredictability of individual responses to these treatments.  The patient expresses understanding of the above and displays the capacity to agree with this current plan.    RETURN TO CLINIC:  Follow up in about 4 weeks (around 7/31/2025).               "

## 2025-07-07 ENCOUNTER — PATIENT MESSAGE (OUTPATIENT)
Dept: PSYCHIATRY | Facility: CLINIC | Age: 35
End: 2025-07-07
Payer: OTHER GOVERNMENT

## 2025-07-07 DIAGNOSIS — F41.1 GAD (GENERALIZED ANXIETY DISORDER): Primary | ICD-10-CM

## 2025-07-07 RX ORDER — BUPROPION HYDROCHLORIDE 150 MG/1
150 TABLET ORAL DAILY
Qty: 30 TABLET | Refills: 2 | Status: SHIPPED | OUTPATIENT
Start: 2025-07-07 | End: 2026-07-07

## 2025-07-14 ENCOUNTER — OFFICE VISIT (OUTPATIENT)
Dept: OBSTETRICS AND GYNECOLOGY | Facility: CLINIC | Age: 35
End: 2025-07-14
Payer: OTHER GOVERNMENT

## 2025-07-14 VITALS
WEIGHT: 156.94 LBS | HEIGHT: 60 IN | SYSTOLIC BLOOD PRESSURE: 112 MMHG | DIASTOLIC BLOOD PRESSURE: 80 MMHG | BODY MASS INDEX: 30.81 KG/M2

## 2025-07-14 DIAGNOSIS — R68.82 LOW LIBIDO: ICD-10-CM

## 2025-07-14 DIAGNOSIS — Z30.09 BIRTH CONTROL COUNSELING: ICD-10-CM

## 2025-07-14 DIAGNOSIS — Z01.419 ENCOUNTER FOR ANNUAL ROUTINE GYNECOLOGICAL EXAMINATION: Primary | ICD-10-CM

## 2025-07-14 PROCEDURE — 99213 OFFICE O/P EST LOW 20 MIN: CPT | Mod: PBBFAC | Performed by: OBSTETRICS & GYNECOLOGY

## 2025-07-14 PROCEDURE — 99999 PR PBB SHADOW E&M-EST. PATIENT-LVL III: CPT | Mod: PBBFAC,,, | Performed by: OBSTETRICS & GYNECOLOGY

## 2025-07-14 RX ORDER — NORETHINDRONE ACETATE AND ETHINYL ESTRADIOL .02; 1 MG/1; MG/1
1 TABLET ORAL DAILY
Qty: 90 TABLET | Refills: 3 | Status: SHIPPED | OUTPATIENT
Start: 2025-07-14 | End: 2026-07-14

## 2025-07-14 RX ORDER — DROSPIRENONE AND ETHINYL ESTRADIOL 0.03MG-3MG
1 KIT ORAL DAILY
Qty: 90 TABLET | Refills: 3 | Status: CANCELLED | OUTPATIENT
Start: 2025-07-14 | End: 2026-07-14

## 2025-07-14 NOTE — PROGRESS NOTES
SUBJECTIVE:     Chief Complaint: Well Woman       History of Present Illness:  Annual Exam  Patient presents for annual exam.   She c/o low libido since delivery of 17 month old today. Wonders if JOSHUA may be causing.   LMP: 25  She denies any vd, vb, dyspareunia, dysuria, depression, anxiety.  Last pap was in  and was neg. HPV neg, due .  Birth Control: justo joshua  declines STD testing.   Recently restarted wellbutrin for anxiety.   Unsure if they will have more kids.      GYN screening history: EVETTE 1 colpo   Mammogram history: na  Colonoscopy history: na  Dexa history: na     FH:   Breast cancer: none  Colon cancer: none  Ovarian cancer: none    Review of patient's allergies indicates:   Allergen Reactions    Lamictal [lamotrigine] Rash    Codeine Nausea And Vomiting     Can take other narcotics with nausea meds    Latex, natural rubber Rash       Past Medical History:   Diagnosis Date    Infertility, female     PONV (postoperative nausea and vomiting)      Past Surgical History:   Procedure Laterality Date    DILATION AND CURETTAGE OF UTERUS USING SUCTION N/A 05/10/2021    Procedure: DILATION AND CURETTAGE, UTERUS, USING SUCTION;  Surgeon: Julie R. Jeansonne, MD;  Location: [facility];  Service: OB/GYN;  Laterality: N/A;    EYE SURGERY  2018    IVF Egg Retrieval  2023     OB History          3    Para   1    Term   1            AB   2    Living   1         SAB   2    IAB        Ectopic        Multiple   0    Live Births   1           Obstetric Comments   Gynhx: 10/reg.  OCP -   Denies std,   Denies abnl, Pap 2016 NEG  S/p gardasil             Family History   Problem Relation Name Age of Onset    Diabetes Other       Social History[1]    Current Outpatient Medications   Medication Sig    buPROPion (WELLBUTRIN XL) 150 MG TB24 tablet Take 1 tablet (150 mg total) by mouth once daily.    norethindrone-ethinyl estradiol (MICROGESTIN ) 1-20 mg-mcg per tablet Take 1 tablet by  mouth once daily.     No current facility-administered medications for this visit.       Review of Systems:  GENERAL: No fever, chills, fatigability or weight loss.  CARDIOVASCULAR: No chest pain. No palpitations.  RESPIRATORY: No SOB, no wheezing.  BREAST: Denies pain. No lumps. No discharge.  VULVAR: No pain, no lesions and no itching.  VAGINAL: No relaxation, no itching, no discharge, no abnormal bleeding and no lesions.  ABDOMEN: No abdominal pain. Denies nausea. Denies vomiting. No diarrhea. No constipation  URINARY: No incontinence, no nocturia, no frequency and no dysuria.  NEUROLOGICAL: No headaches. No vision changes.       OBJECTIVE:     Vitals:    07/14/25 1312   BP: 112/80       Patient verbally consents to pelvic and breast exams. Female chaperone present for exams.   Physical Exam:  Gen: NAD, well developed, well-nourished  HEENT: Normocephalic, atraumatic  Eyes: EOM nl, conjuntivae normal  Neck: ROM normal, no thyromegaly  Respiratory: Effort normal   Abd: soft, nontender, no masses palpated  Breast: Normal bilaterally, no masses, lesions or tenderness. No nipple discharge on expression, no lymphadenopathy bilaterally.  SSE:  Vulva: no lesions or rashes  Cervix: No lesions noted, nonfriable, no vaginal discharge or vaginal bleeding noted  BME:   Cervix: No CMT  Adnexa: nl bilaterally, no masses or fullness palpated  Uterus: normal, nonenlarged  Musculoskeletal: normal ROM  Neuro: alert, AAOx3  Skin: warm and dry  Psych: mood/affect nl, behavior normal, judgement normal, thought content normal        ASSESSMENT:       ICD-10-CM ICD-9-CM    1. Encounter for annual routine gynecological examination  Z01.419 V72.31       2. Low libido  R68.82 799.81       3. Birth control counseling  Z30.09 V25.09              Plan:      Isabelle was seen today for well woman.    Diagnoses and all orders for this visit:    Encounter for annual routine gynecological examination    Low libido    Birth control  counseling    Other orders  -     norethindrone-ethinyl estradiol (MICROGESTIN 1/20) 1-20 mg-mcg per tablet; Take 1 tablet by mouth once daily.  The following orders have not been finalized:  -     Cancel: drospirenone-ethinyl estradioL (GONZALES, 28,) 3-0.03 mg per tablet        No orders of the defined types were placed in this encounter.    Discussed options for birth control. Discussed low dose PJ vs phexxi vs IUD. Would like to try low dose pj. If not improvement in libido over 3 months, advised to consider IUD. Declines phexxi.    Patient was counseled today on ACS guidelines and recommendations for yearly pelvic exams, mammograms and monthly self breast exams; to see her PCP for other health maintenance.      Follow up in one year for annual, or prn.    Julie R Jeansonne             [1]   Social History  Tobacco Use    Smoking status: Never    Smokeless tobacco: Never   Substance Use Topics    Alcohol use: No    Drug use: No

## 2025-07-30 ENCOUNTER — OFFICE VISIT (OUTPATIENT)
Dept: PSYCHIATRY | Facility: CLINIC | Age: 35
End: 2025-07-30
Payer: OTHER GOVERNMENT

## 2025-07-30 DIAGNOSIS — F41.1 GAD (GENERALIZED ANXIETY DISORDER): Primary | ICD-10-CM

## 2025-07-30 PROCEDURE — 98005 SYNCH AUDIO-VIDEO EST LOW 20: CPT | Mod: 95,,, | Performed by: INTERNAL MEDICINE

## 2025-07-30 RX ORDER — BUPROPION HYDROCHLORIDE 150 MG/1
150 TABLET ORAL DAILY
Qty: 90 TABLET | Refills: 3 | Status: SHIPPED | OUTPATIENT
Start: 2025-07-30 | End: 2026-07-30

## 2025-07-30 NOTE — PROGRESS NOTES
OUTPATIENT PSYCHIATRY RETURN VISIT    ENCOUNTER DATE:  7/30/25   SITE:  [facility], [facility]  LENGTH OF SESSION:  10 minutes    The patient location is:  [place], not in a healthcare facility  Visit type:  audiovisual    Face to Face time with patient:  10 minutes  15 minutes of total time spent on the encounter, which includes face to face time and non-face to face time preparing to see the patient (eg, review of tests), Obtaining and/or reviewing separately obtained history, Documenting clinical information in the electronic or other health record, Independently interpreting results (not separately reported) and communicating results to the patient/family/caregiver, or Care coordination (not separately reported).     Each patient to whom he or she provides medical services by telemedicine is:  (1) informed of the relationship between the physician and patient and the respective role of any other health care provider with respect to management of the patient; and (2) notified that he or she may decline to receive medical services by telemedicine and may withdraw from such care at any time.    CHIEF COMPLAINT:  Anxiety      HISTORY OF PRESENTING ILLNESS:  Isabelle Anderson is a 34 y.o. female with history of Anxiety who presents for follow up appointment.      Plan at last appointment on 7/3/2025:  Patient with worsening anxiety recently.  Discussed options of 1. Trying Effexor since she has not tolerated SSRI's in the past but has never tried an SSRI, or 2. Restarting Wellbutrin since it was previously helpful.  She would like to try low dose Effexor.  Start Effexor 37.5mg daily.  *Addendum: Patient sent message on 7/7 stating she would rather restart Wellbutrin XL 150mg daily than start a new medication.    Discussed with patient informed consent, risks versus benefits, alternative treatments, side effect profile and the inherent unpredictability of individual responses to these treatments.   The patient expresses understanding of the above and displays the capacity to agree with this current plan.    History as told by patient:  Right when she started Wellbutrin her body felt anxious but her brain didn't but this was just 1 day.  Other than that day she is much better.   now is going through it - initially thought he was depressed but they are treating him as needed for anxiety with propranolol which has helped him a ton.  He goes through the .  Better at picking at vandana and chewing cheek. Less irritable.  Had 1 episode of hyperfixating on finding a toy for daughter but that is is.  Food noise still there.  Definitely helps overall anxiety and functioning.  Denies feeling depressed.  18 month old going through sleep regression (normally sleeps 7:30-7:30) and in mama only phase.      Previous medication trials:  Zoloft (restless leg, dizzy, zombie), Lexapro (drowsy, restless leg, jittery), Lamictal (felt good on it but after 2-3 months got rash and lip swelling)     Medication side effects:  No  Medication compliance:  Yes    PSYCHIATRIC REVIEW OF SYSTEMS:  Trouble with sleep:  Recently due to 18 month old sleep regression  Appetite changes:  Denies  Weight changes:  Gain over time  Lack of energy:  Denies  Anhedonia:  Denies  Somatic symptoms:  Denies  Libido:  Not discussed  Anxiety/panic:  Yes but improved  Guilty/hopeless:  Denies  Self-injurious behavior/risky behavior:  Denies  Any drugs:  Denies  Alcohol:  Denies  Breastfeeding:  Denies    MEDICAL REVIEW OF SYSTEMS:  Complete review of systems performed covering Constitutional, Musculoskeletal, Neurologic.  All systems negative except for that covered in HPI.    PAST PSYCHIATRIC, MEDICAL, AND SOCIAL HISTORY REVIEWED  The patient's past medical, family and social history have been reviewed and updated as appropriate within the electronic medical record - see encounter notes.    MEDICATIONS:    Current Outpatient Medications:      "buPROPion (WELLBUTRIN XL) 150 MG TB24 tablet, Take 1 tablet (150 mg total) by mouth once daily., Disp: 90 tablet, Rfl: 3    norethindrone-ethinyl estradiol (MICROGESTIN 1/20) 1-20 mg-mcg per tablet, Take 1 tablet by mouth once daily., Disp: 90 tablet, Rfl: 3    ALLERGIES:  Review of patient's allergies indicates:   Allergen Reactions    Lamictal [lamotrigine] Rash    Codeine Nausea And Vomiting     Can take other narcotics with nausea meds    Latex, natural rubber Rash       PSYCHIATRIC EXAM:  There were no vitals filed for this visit.  Appearance:  Well groomed, appearing healthy and of stated age  Behavior:  Cooperative, pleasant, no psychomotor agitation or retardation  Speech:  Normal rate, rhythm, prosody, and volume  Mood:  "Good"  Affect:  Euthymic  Thought Process:  Linear, logical, goal directed  Thought Content:  Negative for suicidal ideation, homicidal ideation, delusions or hallucinations.  Associations:  Intact  Memory:  Grossly Intact  Level of Consciousness/Orientation:  Grossly intact  Fund of Knowledge:  Good  Attention:  Good  Language:  Fluent, able to name abstract and concrete objects  Insight:  Good  Judgment:  Intact  Psychomotor signs:  No involuntary movements of face  Gait:  Unable to assess via virtual visit      RELEVANT LABS/STUDIES:    Lab Results   Component Value Date    WBC 6.83 02/10/2024    HGB 9.8 (L) 02/10/2024    HCT 31.2 (L) 02/10/2024    MCV 81 (L) 02/10/2024     02/10/2024     BMP  Lab Results   Component Value Date     11/19/2024    K 4.8 11/19/2024     02/10/2024    CO2 26 11/19/2024    BUN 13 11/19/2024    CREATININE 0.83 11/19/2024    CALCIUM 9.2 11/19/2024    ANIONGAP 13 02/10/2024    ESTGFRAFRICA >60 07/25/2022    EGFRNONAA >60 07/25/2022     Lab Results   Component Value Date    ALT 11 11/19/2024    AST 15 11/19/2024    ALKPHOS 43 11/19/2024    BILITOT 0.4 11/19/2024     Lab Results   Component Value Date    TSH 0.516 07/25/2022     No results found " "for: "LABA1C", "HGBA1C"    IMPRESSION:    Isabelle Anderson is a 34 y.o. female with history of Anxiety who presents for follow up appointment.    Status/Progress:  Based on the examination today, the patient's problem(s) is/are improving.  New problems have not been presented today.    Risk Parameters:  Patient reports no suicidal ideation  Patient reports no homicidal ideation  Patient reports no self-injurious behavior  Patient reports no violent behavior      DIAGNOSES:    ICD-10-CM ICD-9-CM   1. LEBRON (generalized anxiety disorder)  F41.1 300.02         PLAN:  Anxiety improving after restarting Wellbutrin XL 150mg daily.  Per chart, anxiety worsened on higher dose of 300mg daily.  No medication changes today, but could consider adding an SNRI or Buspar in the future.    Discussed with patient informed consent, risks versus benefits, alternative treatments, side effect profile and the inherent unpredictability of individual responses to these treatments.  The patient expresses understanding of the above and displays the capacity to agree with this current plan.    RETURN TO CLINIC:  Follow up in about 3 months (around 10/30/2025).                   "

## 2025-08-21 ENCOUNTER — PATIENT MESSAGE (OUTPATIENT)
Dept: OBSTETRICS AND GYNECOLOGY | Facility: CLINIC | Age: 35
End: 2025-08-21
Payer: OTHER GOVERNMENT

## (undated) DEVICE — SOL IRR NACL .9% 3000ML

## (undated) DEVICE — SOL 9P NACL IRR PIC IL

## (undated) DEVICE — UNDERGLOVE BIOGEL PI SZ 6.5 LF

## (undated) DEVICE — CURETTE UTERINE BERKELEY 7MM

## (undated) DEVICE — GLOVE BIOGEL SKINSENSE PI 6.5

## (undated) DEVICE — Device